# Patient Record
Sex: MALE | Race: WHITE | NOT HISPANIC OR LATINO | Employment: OTHER | URBAN - METROPOLITAN AREA
[De-identification: names, ages, dates, MRNs, and addresses within clinical notes are randomized per-mention and may not be internally consistent; named-entity substitution may affect disease eponyms.]

---

## 2017-01-23 ENCOUNTER — APPOINTMENT (EMERGENCY)
Dept: RADIOLOGY | Facility: HOSPITAL | Age: 82
DRG: 682 | End: 2017-01-23
Payer: MEDICARE

## 2017-01-23 ENCOUNTER — HOSPITAL ENCOUNTER (INPATIENT)
Facility: HOSPITAL | Age: 82
LOS: 2 days | Discharge: HOME | DRG: 682 | End: 2017-01-25
Attending: EMERGENCY MEDICINE | Admitting: INTERNAL MEDICINE
Payer: MEDICARE

## 2017-01-23 PROBLEM — R06.02 SOB (SHORTNESS OF BREATH): Status: ACTIVE | Noted: 2017-01-23

## 2017-01-23 PROBLEM — I10 UNCONTROLLED HYPERTENSION: Status: ACTIVE | Noted: 2017-01-23

## 2017-01-23 PROBLEM — R09.89 SUSPECTED CONGESTIVE HEART FAILURE: Status: ACTIVE | Noted: 2017-01-23

## 2017-01-23 LAB
ALBUMIN SERPL BCP-MCNC: 3.3 G/DL (ref 3.5–5)
ALP SERPL-CCNC: 65 U/L (ref 46–116)
ALT SERPL W P-5'-P-CCNC: 87 U/L (ref 12–78)
ANION GAP SERPL CALCULATED.3IONS-SCNC: 9 MMOL/L (ref 4–13)
AST SERPL W P-5'-P-CCNC: 36 U/L (ref 5–45)
ATRIAL RATE: 288 BPM
BASOPHILS # BLD AUTO: 0.03 THOUSANDS/ΜL (ref 0–0.1)
BASOPHILS NFR BLD AUTO: 0 % (ref 0–1)
BILIRUB SERPL-MCNC: 0.6 MG/DL (ref 0.2–1)
BILIRUB UR QL STRIP: NEGATIVE
BUN SERPL-MCNC: 27 MG/DL (ref 5–25)
CALCIUM SERPL-MCNC: 8.6 MG/DL (ref 8.3–10.1)
CHLORIDE SERPL-SCNC: 101 MMOL/L (ref 100–108)
CLARITY UR: CLEAR
CO2 SERPL-SCNC: 24 MMOL/L (ref 21–32)
COLOR UR: YELLOW
CREAT SERPL-MCNC: 1.51 MG/DL (ref 0.6–1.3)
EOSINOPHIL # BLD AUTO: 0.02 THOUSAND/ΜL (ref 0–0.61)
EOSINOPHIL NFR BLD AUTO: 0 % (ref 0–6)
ERYTHROCYTE [DISTWIDTH] IN BLOOD BY AUTOMATED COUNT: 14 % (ref 11.6–15.1)
GFR SERPL CREATININE-BSD FRML MDRD: 43.9 ML/MIN/1.73SQ M
GLUCOSE SERPL-MCNC: 138 MG/DL (ref 65–140)
GLUCOSE UR STRIP-MCNC: NEGATIVE MG/DL
HCT VFR BLD AUTO: 40.1 % (ref 36.5–49.3)
HGB BLD-MCNC: 12.9 G/DL (ref 12–17)
HGB UR QL STRIP.AUTO: NEGATIVE
KETONES UR STRIP-MCNC: NEGATIVE MG/DL
LEUKOCYTE ESTERASE UR QL STRIP: NEGATIVE
LYMPHOCYTES # BLD AUTO: 1.53 THOUSANDS/ΜL (ref 0.6–4.47)
LYMPHOCYTES NFR BLD AUTO: 14 % (ref 14–44)
MCH RBC QN AUTO: 31 PG (ref 26.8–34.3)
MCHC RBC AUTO-ENTMCNC: 32.2 G/DL (ref 31.4–37.4)
MCV RBC AUTO: 96 FL (ref 82–98)
MONOCYTES # BLD AUTO: 0.91 THOUSAND/ΜL (ref 0.17–1.22)
MONOCYTES NFR BLD AUTO: 8 % (ref 4–12)
NEUTROPHILS # BLD AUTO: 8.75 THOUSANDS/ΜL (ref 1.85–7.62)
NEUTS SEG NFR BLD AUTO: 78 % (ref 43–75)
NITRITE UR QL STRIP: NEGATIVE
NT-PROBNP SERPL-MCNC: 4603 PG/ML
PH UR STRIP.AUTO: 5 [PH] (ref 4.5–8)
PLATELET # BLD AUTO: 304 THOUSANDS/UL (ref 149–390)
PMV BLD AUTO: 10.9 FL (ref 8.9–12.7)
POTASSIUM SERPL-SCNC: 5.1 MMOL/L (ref 3.5–5.3)
PROT SERPL-MCNC: 7.2 G/DL (ref 6.4–8.2)
PROT UR STRIP-MCNC: NEGATIVE MG/DL
QRS AXIS: -41 DEGREES
QRSD INTERVAL: 92 MS
QT INTERVAL: 512 MS
QTC INTERVAL: 437 MS
RBC # BLD AUTO: 4.16 MILLION/UL (ref 3.88–5.62)
RSV AG SPEC QL: NEGATIVE
SODIUM SERPL-SCNC: 134 MMOL/L (ref 136–145)
SP GR UR STRIP.AUTO: 1.01 (ref 1–1.03)
T WAVE AXIS: 79 DEGREES
TROPONIN I SERPL-MCNC: 0.03 NG/ML
UROBILINOGEN UR QL STRIP.AUTO: 0.2 E.U./DL
VENTRICULAR RATE: 44 BPM
WBC # BLD AUTO: 11.24 THOUSAND/UL (ref 4.31–10.16)

## 2017-01-23 PROCEDURE — 81003 URINALYSIS AUTO W/O SCOPE: CPT | Performed by: INTERNAL MEDICINE

## 2017-01-23 PROCEDURE — 71020 HB CHEST X-RAY 2VW FRONTAL&LATL: CPT

## 2017-01-23 PROCEDURE — 94640 AIRWAY INHALATION TREATMENT: CPT

## 2017-01-23 PROCEDURE — 87798 DETECT AGENT NOS DNA AMP: CPT | Performed by: INTERNAL MEDICINE

## 2017-01-23 PROCEDURE — 80053 COMPREHEN METABOLIC PANEL: CPT | Performed by: EMERGENCY MEDICINE

## 2017-01-23 PROCEDURE — 93005 ELECTROCARDIOGRAM TRACING: CPT

## 2017-01-23 PROCEDURE — 85025 COMPLETE CBC W/AUTO DIFF WBC: CPT | Performed by: EMERGENCY MEDICINE

## 2017-01-23 PROCEDURE — 84484 ASSAY OF TROPONIN QUANT: CPT | Performed by: EMERGENCY MEDICINE

## 2017-01-23 PROCEDURE — 93005 ELECTROCARDIOGRAM TRACING: CPT | Performed by: EMERGENCY MEDICINE

## 2017-01-23 PROCEDURE — 87807 RSV ASSAY W/OPTIC: CPT | Performed by: EMERGENCY MEDICINE

## 2017-01-23 PROCEDURE — 83880 ASSAY OF NATRIURETIC PEPTIDE: CPT | Performed by: EMERGENCY MEDICINE

## 2017-01-23 PROCEDURE — 36415 COLL VENOUS BLD VENIPUNCTURE: CPT | Performed by: EMERGENCY MEDICINE

## 2017-01-23 PROCEDURE — 99285 EMERGENCY DEPT VISIT HI MDM: CPT

## 2017-01-23 RX ORDER — ALBUTEROL SULFATE 2.5 MG/3ML
2.5 SOLUTION RESPIRATORY (INHALATION) ONCE
Status: COMPLETED | OUTPATIENT
Start: 2017-01-23 | End: 2017-01-23

## 2017-01-23 RX ORDER — FUROSEMIDE 10 MG/ML
20 INJECTION INTRAMUSCULAR; INTRAVENOUS ONCE
Status: COMPLETED | OUTPATIENT
Start: 2017-01-23 | End: 2017-01-23

## 2017-01-23 RX ORDER — HYDRALAZINE HYDROCHLORIDE 20 MG/ML
5 INJECTION INTRAMUSCULAR; INTRAVENOUS EVERY 4 HOURS PRN
Status: DISCONTINUED | OUTPATIENT
Start: 2017-01-23 | End: 2017-01-25 | Stop reason: HOSPADM

## 2017-01-23 RX ORDER — CHLORAL HYDRATE 500 MG
1000 CAPSULE ORAL DAILY
COMMUNITY
End: 2021-01-13

## 2017-01-23 RX ORDER — FINASTERIDE 5 MG/1
5 TABLET, FILM COATED ORAL DAILY
Status: DISCONTINUED | OUTPATIENT
Start: 2017-01-24 | End: 2017-01-25 | Stop reason: HOSPADM

## 2017-01-23 RX ORDER — LEVOTHYROXINE SODIUM 0.05 MG/1
50 TABLET ORAL DAILY
Status: DISCONTINUED | OUTPATIENT
Start: 2017-01-24 | End: 2017-01-25 | Stop reason: HOSPADM

## 2017-01-23 RX ORDER — SODIUM CHLORIDE 9 MG/ML
75 INJECTION, SOLUTION INTRAVENOUS CONTINUOUS
Status: DISCONTINUED | OUTPATIENT
Start: 2017-01-23 | End: 2017-01-23

## 2017-01-23 RX ORDER — LEVOTHYROXINE SODIUM 0.05 MG/1
50 TABLET ORAL DAILY
COMMUNITY

## 2017-01-23 RX ORDER — ONDANSETRON 2 MG/ML
4 INJECTION INTRAMUSCULAR; INTRAVENOUS EVERY 6 HOURS PRN
Status: DISCONTINUED | OUTPATIENT
Start: 2017-01-23 | End: 2017-01-25 | Stop reason: HOSPADM

## 2017-01-23 RX ORDER — CHLORAL HYDRATE 500 MG
1000 CAPSULE ORAL DAILY
Status: DISCONTINUED | OUTPATIENT
Start: 2017-01-24 | End: 2017-01-25 | Stop reason: HOSPADM

## 2017-01-23 RX ORDER — HEPARIN SODIUM 5000 [USP'U]/ML
5000 INJECTION, SOLUTION INTRAVENOUS; SUBCUTANEOUS EVERY 8 HOURS SCHEDULED
Status: DISCONTINUED | OUTPATIENT
Start: 2017-01-23 | End: 2017-01-25 | Stop reason: HOSPADM

## 2017-01-23 RX ORDER — LISINOPRIL 20 MG/1
20 TABLET ORAL 2 TIMES DAILY
Status: DISCONTINUED | OUTPATIENT
Start: 2017-01-23 | End: 2017-01-23

## 2017-01-23 RX ORDER — LISINOPRIL 20 MG/1
20 TABLET ORAL 2 TIMES DAILY
COMMUNITY
End: 2017-01-25 | Stop reason: HOSPADM

## 2017-01-23 RX ORDER — ATENOLOL 50 MG/1
50 TABLET ORAL DAILY
COMMUNITY
End: 2021-01-13

## 2017-01-23 RX ORDER — FUROSEMIDE 10 MG/ML
20 INJECTION INTRAMUSCULAR; INTRAVENOUS
Status: DISCONTINUED | OUTPATIENT
Start: 2017-01-24 | End: 2017-01-24

## 2017-01-23 RX ADMIN — FUROSEMIDE 20 MG: 10 INJECTION, SOLUTION INTRAMUSCULAR; INTRAVENOUS at 20:05

## 2017-01-23 RX ADMIN — IPRATROPIUM BROMIDE 0.5 MG: 0.5 SOLUTION RESPIRATORY (INHALATION) at 14:35

## 2017-01-23 RX ADMIN — SODIUM CHLORIDE 75 ML/HR: 0.9 INJECTION, SOLUTION INTRAVENOUS at 16:29

## 2017-01-23 RX ADMIN — ALBUTEROL SULFATE 2.5 MG: 2.5 SOLUTION RESPIRATORY (INHALATION) at 14:35

## 2017-01-23 RX ADMIN — HEPARIN SODIUM 5000 UNITS: 5000 INJECTION, SOLUTION INTRAVENOUS; SUBCUTANEOUS at 21:12

## 2017-01-24 ENCOUNTER — APPOINTMENT (INPATIENT)
Dept: RADIOLOGY | Facility: HOSPITAL | Age: 82
DRG: 682 | End: 2017-01-24
Payer: MEDICARE

## 2017-01-24 LAB
ALBUMIN SERPL BCP-MCNC: 3 G/DL (ref 3.5–5)
ALP SERPL-CCNC: 54 U/L (ref 46–116)
ALT SERPL W P-5'-P-CCNC: 66 U/L (ref 12–78)
ANION GAP SERPL CALCULATED.3IONS-SCNC: 10 MMOL/L (ref 4–13)
AST SERPL W P-5'-P-CCNC: 28 U/L (ref 5–45)
BILIRUB SERPL-MCNC: 0.6 MG/DL (ref 0.2–1)
BUN SERPL-MCNC: 28 MG/DL (ref 5–25)
CALCIUM SERPL-MCNC: 8.5 MG/DL (ref 8.3–10.1)
CHLORIDE SERPL-SCNC: 105 MMOL/L (ref 100–108)
CO2 SERPL-SCNC: 23 MMOL/L (ref 21–32)
CREAT SERPL-MCNC: 1.18 MG/DL (ref 0.6–1.3)
ERYTHROCYTE [DISTWIDTH] IN BLOOD BY AUTOMATED COUNT: 14 % (ref 11.6–15.1)
FLUAV AG SPEC QL: NORMAL
FLUBV AG SPEC QL: NORMAL
GFR SERPL CREATININE-BSD FRML MDRD: 58.4 ML/MIN/1.73SQ M
GLUCOSE SERPL-MCNC: 105 MG/DL (ref 65–140)
HCT VFR BLD AUTO: 38.2 % (ref 36.5–49.3)
HGB BLD-MCNC: 12.1 G/DL (ref 12–17)
MCH RBC QN AUTO: 30.5 PG (ref 26.8–34.3)
MCHC RBC AUTO-ENTMCNC: 31.7 G/DL (ref 31.4–37.4)
MCV RBC AUTO: 96 FL (ref 82–98)
NT-PROBNP SERPL-MCNC: 5222 PG/ML
PLATELET # BLD AUTO: 283 THOUSANDS/UL (ref 149–390)
PMV BLD AUTO: 10.9 FL (ref 8.9–12.7)
POTASSIUM SERPL-SCNC: 4.5 MMOL/L (ref 3.5–5.3)
PROT SERPL-MCNC: 6.5 G/DL (ref 6.4–8.2)
RBC # BLD AUTO: 3.97 MILLION/UL (ref 3.88–5.62)
RSV B RNA SPEC QL NAA+PROBE: NORMAL
SODIUM SERPL-SCNC: 138 MMOL/L (ref 136–145)
TSH SERPL DL<=0.05 MIU/L-ACNC: 1.95 UIU/ML (ref 0.36–3.74)
WBC # BLD AUTO: 9.14 THOUSAND/UL (ref 4.31–10.16)

## 2017-01-24 PROCEDURE — 83880 ASSAY OF NATRIURETIC PEPTIDE: CPT | Performed by: INTERNAL MEDICINE

## 2017-01-24 PROCEDURE — 85027 COMPLETE CBC AUTOMATED: CPT | Performed by: INTERNAL MEDICINE

## 2017-01-24 PROCEDURE — 80053 COMPREHEN METABOLIC PANEL: CPT | Performed by: INTERNAL MEDICINE

## 2017-01-24 PROCEDURE — 84443 ASSAY THYROID STIM HORMONE: CPT | Performed by: INTERNAL MEDICINE

## 2017-01-24 PROCEDURE — 72100 X-RAY EXAM L-S SPINE 2/3 VWS: CPT

## 2017-01-24 PROCEDURE — 73521 X-RAY EXAM HIPS BI 2 VIEWS: CPT

## 2017-01-24 RX ADMIN — FINASTERIDE 5 MG: 5 TABLET, FILM COATED ORAL at 08:39

## 2017-01-24 RX ADMIN — FUROSEMIDE 20 MG: 10 INJECTION, SOLUTION INTRAMUSCULAR; INTRAVENOUS at 08:39

## 2017-01-24 RX ADMIN — LEVOTHYROXINE SODIUM 50 MCG: 50 TABLET ORAL at 08:39

## 2017-01-24 RX ADMIN — HEPARIN SODIUM 5000 UNITS: 5000 INJECTION, SOLUTION INTRAVENOUS; SUBCUTANEOUS at 14:39

## 2017-01-24 RX ADMIN — Medication 1000 MG: at 08:39

## 2017-01-24 RX ADMIN — HEPARIN SODIUM 5000 UNITS: 5000 INJECTION, SOLUTION INTRAVENOUS; SUBCUTANEOUS at 21:08

## 2017-01-24 RX ADMIN — HEPARIN SODIUM 5000 UNITS: 5000 INJECTION, SOLUTION INTRAVENOUS; SUBCUTANEOUS at 05:01

## 2017-01-25 VITALS
RESPIRATION RATE: 18 BRPM | DIASTOLIC BLOOD PRESSURE: 83 MMHG | BODY MASS INDEX: 25.43 KG/M2 | TEMPERATURE: 98.1 F | HEIGHT: 77 IN | OXYGEN SATURATION: 90 % | HEART RATE: 66 BPM | SYSTOLIC BLOOD PRESSURE: 177 MMHG | WEIGHT: 215.39 LBS

## 2017-01-25 PROBLEM — R06.02 SOB (SHORTNESS OF BREATH): Status: RESOLVED | Noted: 2017-01-23 | Resolved: 2017-01-25

## 2017-01-25 LAB
ANION GAP SERPL CALCULATED.3IONS-SCNC: 8 MMOL/L (ref 4–13)
BUN SERPL-MCNC: 28 MG/DL (ref 5–25)
CALCIUM SERPL-MCNC: 8.6 MG/DL (ref 8.3–10.1)
CHLORIDE SERPL-SCNC: 107 MMOL/L (ref 100–108)
CO2 SERPL-SCNC: 24 MMOL/L (ref 21–32)
CREAT SERPL-MCNC: 1.24 MG/DL (ref 0.6–1.3)
GFR SERPL CREATININE-BSD FRML MDRD: 55.1 ML/MIN/1.73SQ M
GLUCOSE SERPL-MCNC: 106 MG/DL (ref 65–140)
POTASSIUM SERPL-SCNC: 4.7 MMOL/L (ref 3.5–5.3)
SODIUM SERPL-SCNC: 139 MMOL/L (ref 136–145)

## 2017-01-25 PROCEDURE — 80048 BASIC METABOLIC PNL TOTAL CA: CPT | Performed by: INTERNAL MEDICINE

## 2017-01-25 RX ORDER — FUROSEMIDE 20 MG/1
20 TABLET ORAL DAILY
Qty: 30 TABLET | Refills: 0 | Status: SHIPPED | OUTPATIENT
Start: 2017-01-25 | End: 2021-01-16 | Stop reason: HOSPADM

## 2017-01-25 RX ORDER — FUROSEMIDE 20 MG/1
20 TABLET ORAL DAILY
Status: DISCONTINUED | OUTPATIENT
Start: 2017-01-25 | End: 2017-01-25 | Stop reason: HOSPADM

## 2017-01-25 RX ADMIN — HEPARIN SODIUM 5000 UNITS: 5000 INJECTION, SOLUTION INTRAVENOUS; SUBCUTANEOUS at 05:21

## 2017-01-25 RX ADMIN — FUROSEMIDE 20 MG: 20 TABLET ORAL at 09:08

## 2017-01-25 RX ADMIN — FINASTERIDE 5 MG: 5 TABLET, FILM COATED ORAL at 08:12

## 2017-01-25 RX ADMIN — LEVOTHYROXINE SODIUM 50 MCG: 50 TABLET ORAL at 08:12

## 2017-01-25 RX ADMIN — Medication 1000 MG: at 08:12

## 2017-01-26 ENCOUNTER — GENERIC CONVERSION - ENCOUNTER (OUTPATIENT)
Dept: OTHER | Facility: OTHER | Age: 82
End: 2017-01-26

## 2018-03-07 NOTE — PROGRESS NOTES
Dear Carie Cornejo,  My name is Dutch Davis and I am a Registered Nurse and Care Coordinator for [facility]  We recently  spoke on the phone regarding your hospital stay and you opted to receive follow-up phone calls from me  Because of the reason that you were in the hospital, Medicare has placed you in a program called [address]  One of  the benefits of the program is that you can have a nurse call regularly to answer any questions or concerns you may have  My phone number is listed below in case you would need to contact me      Sincerely,   Dutch Davis RN  [phone]            Electronically signed Vashti Luna RN  Jan 26 2017  1:57PM EST Author

## 2019-04-25 ENCOUNTER — APPOINTMENT (OUTPATIENT)
Dept: LAB | Facility: CLINIC | Age: 84
End: 2019-04-25
Payer: MEDICARE

## 2019-04-25 ENCOUNTER — TELEPHONE (OUTPATIENT)
Dept: UROLOGY | Facility: MEDICAL CENTER | Age: 84
End: 2019-04-25

## 2019-04-25 DIAGNOSIS — R30.0 DYSURIA: ICD-10-CM

## 2019-04-25 DIAGNOSIS — R31.0 GROSS HEMATURIA: ICD-10-CM

## 2019-04-25 DIAGNOSIS — R31.0 GROSS HEMATURIA: Primary | ICD-10-CM

## 2019-04-25 LAB
BACTERIA UR QL AUTO: ABNORMAL /HPF
BILIRUB UR QL STRIP: ABNORMAL
CLARITY UR: ABNORMAL
COLOR UR: ABNORMAL
GLUCOSE UR STRIP-MCNC: NEGATIVE MG/DL
HGB UR QL STRIP.AUTO: ABNORMAL
HYALINE CASTS #/AREA URNS LPF: ABNORMAL /LPF
KETONES UR STRIP-MCNC: ABNORMAL MG/DL
LEUKOCYTE ESTERASE UR QL STRIP: ABNORMAL
NITRITE UR QL STRIP: NEGATIVE
NON-SQ EPI CELLS URNS QL MICRO: ABNORMAL /HPF
PH UR STRIP.AUTO: 6 [PH]
PROT UR STRIP-MCNC: ABNORMAL MG/DL
RBC #/AREA URNS AUTO: ABNORMAL /HPF
SP GR UR STRIP.AUTO: 1.02 (ref 1–1.03)
UROBILINOGEN UR QL STRIP.AUTO: 0.2 E.U./DL
WBC #/AREA URNS AUTO: ABNORMAL /HPF

## 2019-04-25 PROCEDURE — 87147 CULTURE TYPE IMMUNOLOGIC: CPT

## 2019-04-25 PROCEDURE — 81001 URINALYSIS AUTO W/SCOPE: CPT

## 2019-04-25 PROCEDURE — 87086 URINE CULTURE/COLONY COUNT: CPT

## 2019-04-26 DIAGNOSIS — R30.0 DYSURIA: Primary | ICD-10-CM

## 2019-04-26 LAB — BACTERIA UR CULT: ABNORMAL

## 2019-04-26 RX ORDER — NITROFURANTOIN 25; 75 MG/1; MG/1
100 CAPSULE ORAL 2 TIMES DAILY
Qty: 10 CAPSULE | Refills: 0 | Status: SHIPPED | OUTPATIENT
Start: 2019-04-26 | End: 2019-05-01

## 2020-07-30 PROCEDURE — 88305 TISSUE EXAM BY PATHOLOGIST: CPT | Performed by: PATHOLOGY

## 2020-07-31 ENCOUNTER — LAB REQUISITION (OUTPATIENT)
Dept: LAB | Facility: HOSPITAL | Age: 85
End: 2020-07-31
Payer: MEDICARE

## 2020-07-31 DIAGNOSIS — D48.5 NEOPLASM OF UNCERTAIN BEHAVIOR OF SKIN: ICD-10-CM

## 2021-01-08 ENCOUNTER — AMB VIDEO VISIT (OUTPATIENT)
Dept: URGENT CARE | Age: 86
End: 2021-01-08

## 2021-01-08 DIAGNOSIS — R30.0 DYSURIA: Primary | ICD-10-CM

## 2021-01-08 RX ORDER — SULFAMETHOXAZOLE AND TRIMETHOPRIM 800; 160 MG/1; MG/1
1 TABLET ORAL EVERY 12 HOURS SCHEDULED
Qty: 14 TABLET | Refills: 0 | Status: SHIPPED | OUTPATIENT
Start: 2021-01-08 | End: 2021-01-15

## 2021-01-08 NOTE — PATIENT INSTRUCTIONS
Take medications as directed  Drink plenty of fluids  Follow up with family doctor this week  Go to ER immediately if new or worsening symptoms occur  Dysuria   WHAT YOU NEED TO KNOW:   Dysuria is difficulty urinating, or pain, burning, or discomfort with urination  Dysuria is usually a symptom of another problem  DISCHARGE INSTRUCTIONS:   Return to the emergency department if:   · You have severe back, side, or abdominal pain  · You have fever and shaking chills  · You vomit several times in a row  Contact your healthcare provider if:   · Your symptoms do not go away, even after treatment  · You have questions or concerns about your condition or care  Medicines:   · Medicines  may be given to help treat a bacterial infection or help decrease bladder spasms  · Take your medicine as directed  Contact your healthcare provider if you think your medicine is not helping or if you have side effects  Tell him of her if you are allergic to any medicine  Keep a list of the medicines, vitamins, and herbs you take  Include the amounts, and when and why you take them  Bring the list or the pill bottles to follow-up visits  Carry your medicine list with you in case of an emergency  Follow up with your healthcare provider as directed: Your healthcare provider may also refer you to a urologist or nephrologist to have additional testing  Write down your questions so you remember to ask them during your visits  Manage your dysuria:   · Drink more liquids  Liquids help flush out bacteria that may be causing an infection  Ask your healthcare provider how much liquid to drink each day and which liquids are best for you  · Take sitz baths as directed  Fill a bathtub with 4 to 6 inches of warm water  You may also use a sitz bath pan that fits over a toilet  Sit in the sitz bath for 20 minutes  Do this 2 to 3 times a day, or as directed  The warm water can help decrease pain and swelling       © Copyright [address] Information is for [employer] use only and may not be sold, redistributed or otherwise used for commercial purposes  All illustrations and images included in CareNotes® are the copyrighted property of A D A M , Inc  or Kathrine Bone  The above information is an  only  It is not intended as medical advice for individual conditions or treatments  Talk to your doctor, nurse or pharmacist before following any medical regimen to see if it is safe and effective for you

## 2021-01-08 NOTE — CARE ANYWHERE EVISITS
[facility] - Gender: Male - Date of Birth: [id number]  Date: [id number] - Duration: 6 minutes  [facility]  Provider: Ameya Garrett PA-C    Patient Contact Information  Address  [address] [place] [address] Venessa Velasquez [address]  [phone]    Visit Topics  burning during urination, tinge of blood in urine [Added By: Self - 2021-01-08]    Triage Questions   What is your current physical address in the event of a medical emergency? Answer []  Are you allergic to any medications? Answer []  Are you now or could you be pregnant? Answer []  Do you have any immune system compromise or chronic lung   disease? Answer []  Do you have any vulnerable family members in the home (infant, pregnant, cancer, elderly)? Answer []     Conversation Transcripts  [0A][0A] [Notification] You are connected with Ameya Garrett PA-C, Urgent Care Specialist [0A][Notification] Dada Jensen is located in [place]  [0A][Notification] Dada Jensen has shared health history  [place]  [0A]    Diagnosis  Acute cystitis with hematuria    Procedures  Value: [id number] Code: CPT-4 UNLISTED E&M SERVICE    Medications Prescribed    No prescriptions ordered    Electronically signed by: Farzad Schulzt(NPI [id number])

## 2021-01-08 NOTE — PROGRESS NOTES
Video Visit - Andreas Tellezstefani 80 y o  male MRN: [id number]    I educated patient and family that in this setting I cannot send urine for culture  If for whatever reason urine is resistant to the antibiotic I will prescribe, we will have no way of knowing  I educated them that they should probably go to the urgent care to get the urine cultured  They do not want to do this and would like to be treated empirically  I educated them on signs and symptoms of worsening condition in indications to go to the ER  I educated them on proper hydration  Patient and family state they understand and agree    Not on blood thinners  NKDA    REQUIRED DOCUMENTATION:         1  This service was provided via [facility]  2  Provider located at [place] [address] [address] [place] [address]  [phone]   3  [facility] provider: Rafat House PA-C   4  Identify all parties in room with patient during [facility] visit:  Self, daughter, son-in-law  11  After connecting through [employer], patient was identified by name and date of birth  Patient was then informed that this was a Telemedicine visit and that the exam was being conducted confidentially over secure lines  My office door was closed  No one else was in the room  Patient acknowledged consent and understanding of privacy and security of the Telemedicine visit  I informed the patient that I have reviewed their record in Epic and presented the opportunity for them to ask any questions regarding the visit today  The patient agreed to participate  Difficulty Urinating   This is a new problem  The current episode started today  The problem occurs every urination  The problem has been rapidly improving (Went to the bathroom to urinate while we were on the call, no burning)  The quality of the pain is described as burning  There has been no fever  He is not sexually active   Associated symptoms include frequency, hematuria (Small amount with urination last night, none at most recent urination) and urgency  Pertinent negatives include no chills, discharge, flank pain, hesitancy, nausea, sweats or vomiting  He has tried nothing for the symptoms  The treatment provided no relief  There is no history of catheterization, kidney stones, a single kidney, urinary stasis or a urological procedure  Physical Exam  Constitutional:       Appearance: Normal appearance  HENT:      Head: Normocephalic and atraumatic  Pulmonary:      Effort: Pulmonary effort is normal  No respiratory distress  Abdominal:      General: There is no distension  Tenderness: There is no abdominal tenderness  Comments: Denies any flank pain   Skin:     Capillary Refill: Capillary refill takes less than 2 seconds  Coloration: Skin is not pale  Findings: No rash  Neurological:      Mental Status: He is alert  Diagnoses and all orders for this visit:    Dysuria  -     sulfamethoxazole-trimethoprim (BACTRIM DS) 800-160 mg per tablet; Take 1 tablet by mouth every 12 (twelve) hours for 7 days      Patient Instructions   Take medications as directed  Drink plenty of fluids  Follow up with family doctor this week  Go to ER immediately if new or worsening symptoms occur  Dysuria   WHAT YOU NEED TO KNOW:   Dysuria is difficulty urinating, or pain, burning, or discomfort with urination  Dysuria is usually a symptom of another problem  DISCHARGE INSTRUCTIONS:   Return to the emergency department if:   · You have severe back, side, or abdominal pain  · You have fever and shaking chills  · You vomit several times in a row  Contact your healthcare provider if:   · Your symptoms do not go away, even after treatment  · You have questions or concerns about your condition or care  Medicines:   · Medicines  may be given to help treat a bacterial infection or help decrease bladder spasms  · Take your medicine as directed    Contact your healthcare provider if you think your medicine is not helping or if you have side effects  Tell him of her if you are allergic to any medicine  Keep a list of the medicines, vitamins, and herbs you take  Include the amounts, and when and why you take them  Bring the list or the pill bottles to follow-up visits  Carry your medicine list with you in case of an emergency  Follow up with your healthcare provider as directed: Your healthcare provider may also refer you to a urologist or nephrologist to have additional testing  Write down your questions so you remember to ask them during your visits  Manage your dysuria:   · Drink more liquids  Liquids help flush out bacteria that may be causing an infection  Ask your healthcare provider how much liquid to drink each day and which liquids are best for you  · Take sitz baths as directed  Fill a bathtub with 4 to 6 inches of warm water  You may also use a sitz bath pan that fits over a toilet  Sit in the sitz bath for 20 minutes  Do this 2 to 3 times a day, or as directed  The warm water can help decrease pain and swelling  © Copyright [facility] [address] Information is for End User's use only and may not be sold, redistributed or otherwise used for commercial purposes  All illustrations and images included in CareNotes® are the copyrighted property of A D A M , Inc  or [facility] Nikita Hendrickson   The above information is an  only  It is not intended as medical advice for individual conditions or treatments  Talk to your doctor, nurse or pharmacist before following any medical regimen to see if it is safe and effective for you  Follow up with PCP if not improved, if symptoms are worse, go to the ER

## 2021-01-13 ENCOUNTER — APPOINTMENT (EMERGENCY)
Dept: RADIOLOGY | Facility: HOSPITAL | Age: 86
DRG: 683 | End: 2021-01-13
Payer: MEDICARE

## 2021-01-13 ENCOUNTER — HOSPITAL ENCOUNTER (INPATIENT)
Facility: HOSPITAL | Age: 86
LOS: 3 days | Discharge: HOME HEALTH | DRG: 683 | End: 2021-01-16
Attending: INTERNAL MEDICINE | Admitting: INTERNAL MEDICINE
Payer: MEDICARE

## 2021-01-13 DIAGNOSIS — I48.91 NEW ONSET A-FIB (HCC): ICD-10-CM

## 2021-01-13 DIAGNOSIS — N40.0 BENIGN PROSTATIC HYPERPLASIA, UNSPECIFIED WHETHER LOWER URINARY TRACT SYMPTOMS PRESENT: ICD-10-CM

## 2021-01-13 DIAGNOSIS — R93.41 ABNORMAL ULTRASOUND OF BLADDER: ICD-10-CM

## 2021-01-13 DIAGNOSIS — R26.2 AMBULATORY DYSFUNCTION: ICD-10-CM

## 2021-01-13 DIAGNOSIS — I48.91 NEW ONSET ATRIAL FIBRILLATION (HCC): ICD-10-CM

## 2021-01-13 DIAGNOSIS — R09.89 SUSPECTED CONGESTIVE HEART FAILURE: ICD-10-CM

## 2021-01-13 DIAGNOSIS — N17.9 AKI (ACUTE KIDNEY INJURY) (HCC): Primary | ICD-10-CM

## 2021-01-13 DIAGNOSIS — E87.1 HYPONATREMIA: ICD-10-CM

## 2021-01-13 DIAGNOSIS — I50.9 ACUTE ON CHRONIC HEART FAILURE, UNSPECIFIED HEART FAILURE TYPE (HCC): ICD-10-CM

## 2021-01-13 LAB
ANION GAP SERPL CALCULATED.3IONS-SCNC: 13 MMOL/L (ref 4–13)
APTT PPP: 27 SECONDS (ref 23–31)
ATRIAL RATE: 0 BPM
ATRIAL RATE: 0 BPM
BASOPHILS # BLD MANUAL: 0 THOUSAND/UL (ref 0–0.1)
BASOPHILS NFR MAR MANUAL: 0 % (ref 0–1)
BNP SERPL-MCNC: 771.4 PG/ML (ref 1–100)
BUN SERPL-MCNC: 81 MG/DL (ref 6–20)
CALCIUM SERPL-MCNC: 8.5 MG/DL (ref 8.4–10.2)
CHLORIDE SERPL-SCNC: 97 MMOL/L (ref 96–108)
CO2 SERPL-SCNC: 18 MMOL/L (ref 22–33)
CREAT SERPL-MCNC: 2.92 MG/DL (ref 0.5–1.2)
EOSINOPHIL # BLD MANUAL: 0.2 THOUSAND/UL (ref 0–0.4)
EOSINOPHIL NFR BLD MANUAL: 2 % (ref 0–6)
ERYTHROCYTE [DISTWIDTH] IN BLOOD BY AUTOMATED COUNT: 14.8 % (ref 11.6–15.1)
GFR SERPL CREATININE-BSD FRML MDRD: 18 ML/MIN/1.73SQ M
GLUCOSE SERPL-MCNC: 158 MG/DL (ref 65–140)
HCT VFR BLD AUTO: 35.7 % (ref 36.5–49.3)
HGB BLD-MCNC: 12.1 G/DL (ref 12–17)
INR PPP: 1.01 (ref 0.9–1.1)
LYMPHOCYTES # BLD AUTO: 2.69 THOUSAND/UL (ref 0.6–4.47)
LYMPHOCYTES # BLD AUTO: 27 % (ref 14–44)
MAGNESIUM SERPL-MCNC: 2.8 MG/DL (ref 1.6–2.6)
MCH RBC QN AUTO: 32.3 PG (ref 26.8–34.3)
MCHC RBC AUTO-ENTMCNC: 33.9 G/DL (ref 31.4–37.4)
MCV RBC AUTO: 95 FL (ref 82–98)
MONOCYTES # BLD AUTO: 0.4 THOUSAND/UL (ref 0–1.22)
MONOCYTES NFR BLD: 4 % (ref 4–12)
NEUTROPHILS # BLD MANUAL: 6.67 THOUSAND/UL (ref 1.85–7.62)
NEUTS BAND NFR BLD MANUAL: 6 % (ref 0–8)
NEUTS SEG NFR BLD AUTO: 61 % (ref 43–75)
OSMOLALITY UR/SERPL-RTO: 299 MMOL/KG (ref 282–298)
OSMOLALITY UR: 271 MMOL/KG
PLATELET # BLD AUTO: 329 THOUSANDS/UL (ref 149–390)
PLATELET BLD QL SMEAR: ADEQUATE
PMV BLD AUTO: 10.9 FL (ref 8.9–12.7)
POTASSIUM SERPL-SCNC: 4.8 MMOL/L (ref 3.5–5)
PR INTERVAL: 210 MS
PR INTERVAL: 210 MS
PROTHROMBIN TIME: 11.4 SECONDS (ref 9.5–12.1)
QRS AXIS: -49 DEGREES
QRS AXIS: -49 DEGREES
QRSD INTERVAL: 111 MS
QRSD INTERVAL: 111 MS
QT INTERVAL: 450 MS
QT INTERVAL: 450 MS
QTC INTERVAL: 443 MS
QTC INTERVAL: 443 MS
RBC # BLD AUTO: 3.75 MILLION/UL (ref 3.88–5.62)
RBC MORPH BLD: NORMAL
SODIUM SERPL-SCNC: 128 MMOL/L (ref 133–145)
T WAVE AXIS: 78 DEGREES
T WAVE AXIS: 78 DEGREES
TOTAL CELLS COUNTED SPEC: 100
TROPONIN I SERPL-MCNC: 0.03 NG/ML (ref 0–0.07)
TROPONIN I SERPL-MCNC: 0.04 NG/ML (ref 0–0.07)
TROPONIN I SERPL-MCNC: 0.04 NG/ML (ref 0–0.07)
TSH SERPL DL<=0.05 MIU/L-ACNC: 3.32 UIU/ML (ref 0.34–5.6)
VENTRICULAR RATE: 58 BPM
VENTRICULAR RATE: 58 BPM
WBC # BLD AUTO: 9.96 THOUSAND/UL (ref 4.31–10.16)

## 2021-01-13 PROCEDURE — 85730 THROMBOPLASTIN TIME PARTIAL: CPT | Performed by: INTERNAL MEDICINE

## 2021-01-13 PROCEDURE — 83735 ASSAY OF MAGNESIUM: CPT | Performed by: PHYSICIAN ASSISTANT

## 2021-01-13 PROCEDURE — 93010 ELECTROCARDIOGRAM REPORT: CPT | Performed by: INTERNAL MEDICINE

## 2021-01-13 PROCEDURE — 82570 ASSAY OF URINE CREATININE: CPT | Performed by: INTERNAL MEDICINE

## 2021-01-13 PROCEDURE — 93005 ELECTROCARDIOGRAM TRACING: CPT

## 2021-01-13 PROCEDURE — 85027 COMPLETE CBC AUTOMATED: CPT | Performed by: PHYSICIAN ASSISTANT

## 2021-01-13 PROCEDURE — 99284 EMERGENCY DEPT VISIT MOD MDM: CPT

## 2021-01-13 PROCEDURE — 80048 BASIC METABOLIC PNL TOTAL CA: CPT | Performed by: PHYSICIAN ASSISTANT

## 2021-01-13 PROCEDURE — 71046 X-RAY EXAM CHEST 2 VIEWS: CPT

## 2021-01-13 PROCEDURE — 83036 HEMOGLOBIN GLYCOSYLATED A1C: CPT | Performed by: INTERNAL MEDICINE

## 2021-01-13 PROCEDURE — 85007 BL SMEAR W/DIFF WBC COUNT: CPT | Performed by: PHYSICIAN ASSISTANT

## 2021-01-13 PROCEDURE — 36415 COLL VENOUS BLD VENIPUNCTURE: CPT | Performed by: PHYSICIAN ASSISTANT

## 2021-01-13 PROCEDURE — 84484 ASSAY OF TROPONIN QUANT: CPT | Performed by: INTERNAL MEDICINE

## 2021-01-13 PROCEDURE — 84300 ASSAY OF URINE SODIUM: CPT | Performed by: INTERNAL MEDICINE

## 2021-01-13 PROCEDURE — 83930 ASSAY OF BLOOD OSMOLALITY: CPT | Performed by: PHYSICIAN ASSISTANT

## 2021-01-13 PROCEDURE — 83935 ASSAY OF URINE OSMOLALITY: CPT | Performed by: INTERNAL MEDICINE

## 2021-01-13 PROCEDURE — 99284 EMERGENCY DEPT VISIT MOD MDM: CPT | Performed by: PHYSICIAN ASSISTANT

## 2021-01-13 PROCEDURE — 84484 ASSAY OF TROPONIN QUANT: CPT | Performed by: PHYSICIAN ASSISTANT

## 2021-01-13 PROCEDURE — 1123F ACP DISCUSS/DSCN MKR DOCD: CPT | Performed by: PHYSICIAN ASSISTANT

## 2021-01-13 PROCEDURE — 83880 ASSAY OF NATRIURETIC PEPTIDE: CPT | Performed by: PHYSICIAN ASSISTANT

## 2021-01-13 PROCEDURE — 99223 1ST HOSP IP/OBS HIGH 75: CPT | Performed by: INTERNAL MEDICINE

## 2021-01-13 PROCEDURE — 84443 ASSAY THYROID STIM HORMONE: CPT | Performed by: INTERNAL MEDICINE

## 2021-01-13 PROCEDURE — 85610 PROTHROMBIN TIME: CPT | Performed by: INTERNAL MEDICINE

## 2021-01-13 RX ORDER — METOPROLOL SUCCINATE 25 MG/1
25 TABLET, EXTENDED RELEASE ORAL DAILY
COMMUNITY
End: 2021-01-16 | Stop reason: HOSPADM

## 2021-01-13 RX ORDER — HEPARIN SODIUM 1000 [USP'U]/ML
2000 INJECTION, SOLUTION INTRAVENOUS; SUBCUTANEOUS
Status: DISCONTINUED | OUTPATIENT
Start: 2021-01-13 | End: 2021-01-14

## 2021-01-13 RX ORDER — FUROSEMIDE 10 MG/ML
20 INJECTION INTRAMUSCULAR; INTRAVENOUS ONCE
Status: COMPLETED | OUTPATIENT
Start: 2021-01-13 | End: 2021-01-13

## 2021-01-13 RX ORDER — SACUBITRIL AND VALSARTAN 24; 26 MG/1; MG/1
1 TABLET, FILM COATED ORAL 2 TIMES DAILY
COMMUNITY
End: 2021-01-16 | Stop reason: HOSPADM

## 2021-01-13 RX ORDER — HEPARIN SODIUM 10000 [USP'U]/100ML
3-20 INJECTION, SOLUTION INTRAVENOUS
Status: DISCONTINUED | OUTPATIENT
Start: 2021-01-13 | End: 2021-01-14

## 2021-01-13 RX ORDER — SODIUM CHLORIDE 9 MG/ML
50 INJECTION, SOLUTION INTRAVENOUS CONTINUOUS
Status: DISCONTINUED | OUTPATIENT
Start: 2021-01-13 | End: 2021-01-13

## 2021-01-13 RX ORDER — ASPIRIN 81 MG/1
81 TABLET ORAL DAILY
COMMUNITY
End: 2021-01-16 | Stop reason: HOSPADM

## 2021-01-13 RX ORDER — LEVOTHYROXINE SODIUM 0.05 MG/1
50 TABLET ORAL DAILY
Status: DISCONTINUED | OUTPATIENT
Start: 2021-01-14 | End: 2021-01-16 | Stop reason: HOSPADM

## 2021-01-13 RX ORDER — FINASTERIDE 5 MG/1
5 TABLET, FILM COATED ORAL DAILY
Status: DISCONTINUED | OUTPATIENT
Start: 2021-01-14 | End: 2021-01-16 | Stop reason: HOSPADM

## 2021-01-13 RX ORDER — HEPARIN SODIUM 1000 [USP'U]/ML
4000 INJECTION, SOLUTION INTRAVENOUS; SUBCUTANEOUS
Status: DISCONTINUED | OUTPATIENT
Start: 2021-01-13 | End: 2021-01-14

## 2021-01-13 RX ORDER — METOPROLOL SUCCINATE 25 MG/1
25 TABLET, EXTENDED RELEASE ORAL DAILY
Status: DISCONTINUED | OUTPATIENT
Start: 2021-01-14 | End: 2021-01-14

## 2021-01-13 RX ORDER — ONDANSETRON 2 MG/ML
4 INJECTION INTRAMUSCULAR; INTRAVENOUS EVERY 6 HOURS PRN
Status: DISCONTINUED | OUTPATIENT
Start: 2021-01-13 | End: 2021-01-16 | Stop reason: HOSPADM

## 2021-01-13 RX ORDER — ASPIRIN 81 MG/1
81 TABLET ORAL DAILY
Status: DISCONTINUED | OUTPATIENT
Start: 2021-01-14 | End: 2021-01-15

## 2021-01-13 RX ORDER — ACETAMINOPHEN 325 MG/1
650 TABLET ORAL EVERY 4 HOURS PRN
Status: DISCONTINUED | OUTPATIENT
Start: 2021-01-13 | End: 2021-01-16 | Stop reason: HOSPADM

## 2021-01-13 RX ADMIN — FUROSEMIDE 20 MG: 10 INJECTION, SOLUTION INTRAMUSCULAR; INTRAVENOUS at 17:00

## 2021-01-13 RX ADMIN — HEPARIN SODIUM 12 UNITS/KG/HR: 10000 INJECTION, SOLUTION INTRAVENOUS at 19:03

## 2021-01-13 RX ADMIN — SODIUM CHLORIDE 125 ML/HR: 0.9 INJECTION, SOLUTION INTRAVENOUS at 14:27

## 2021-01-13 NOTE — H&P
H&P- Rickeyfaraz No Difilippantonio 7/23/1929, 80 y o  male MRN: [id number]    Unit/Bed#: -01 Encounter: [id number]    Primary Care Provider: Rubina Caraballo MD   Date and time admitted to hospital: 1/13/2021  1:49 PM        LUIS (acute kidney injury) [facility])  Assessment & Plan  Pt here with creatinine of 2 9 probably due to prerenal in origin  Baseline 1 1-1 2  Elevated BUN of 90 on admission  Pt was taking Bactrim since last 1 week which may have except septated LUIS  Plan  -nephrology consulted  Follow-up with your electrolyte  -follow-up with bladder scan or  -hold Lasix and Entresto  Avoid nephrotoxic drugs  CMP in a m  * Acute on chronic heart failure [facility])  Assessment & Plan  Wt Readings from Last 3 Encounters:   01/14/21 75 8 kg (167 lb)   01/23/17 97 7 kg (215 lb 6 2 oz)   05/03/16 95 3 kg (210 lb)   Following up with cardiologist Dr Shwetha Aguilar  Pt not sure about last echocardiogram states around 3 years ago  Previous admission for acute onset diastolic heart failure  B/L rays pitting edema found on examination  Admitted for LUIS  No chest pain, palpitations, lack tenderness or pain, fever, N/V   EKG showing new onset AFib  CXR showing cardiomegaly but no vascular congestion present  Lipid panel on 01/12/2021 was WNL  Troponin times on WNL  BNP on admission 700  Continue taking metoprolol  Continue to hold Lasix and Entresto  Daily weight  I's and O's  Salt restricted diet  Will start on very gentle hydration 50 cc/hour normal saline due to suspicion of hypovolemia  Follow-up with HbA1c  Follow-up TSH  Continue trending troponin x2  Will call patient's cardiologist tomorrow morning  BPH (benign prostatic hyperplasia)  Assessment & Plan  Continue finasteride    Ambulatory dysfunction  Assessment & Plan  Uses walker at home    Follow-up with PT OT  Case management following  Supportive measures  Fall precautions  Activity with assistance      New onset a-fib [facility])  Assessment & Plan  EKG showing new onset AFib  Rate controlled  On heparin drip  Telemetry monitoring  Cardiology consult  Continue metoprolol    Hyponatremia  Assessment & Plan  Sodium of 128 on admission secondary to volume overload  Continue diuretics  Follow-up with urine electrolytes    Hypothyroidism  Assessment & Plan  Continue levothyroxine  Will follow-up with TSH in a niya Segura Hypertension  Assessment & Plan  Continue metoprolol  Hold home Lasix and Entresto due to LUIS  Emergency Contacts: Extended Emergency Contact Information  Primary Emergency Contact: [address] Phone: [phone]  Relation: Spouse  Secondary Emergency Contact: Shira Hough   [address] Phone: [phone]  Mobile Phone: [phone]  Relation: Daughter      VTE Prophylaxis: Heparin  / sequential compression device and foot pump applied   Code Status:  Level 3 DNR DNI  POLST: There is no POLST form on file for this patient (pre-hospital)    Discussion with family:  Discussed with daughter in length was present while encounter  Talked to her about patient's condition and probable cause and treatment plan  Daughter agrees with the plan  All questions/concerns were answered  Anticipated Length of Stay:  Patient will be admitted on an Inpatient basis with an anticipated length of stay of  > 2 midnights  Justification for Hospital Stay:  Acute on chronic heart failure , LUIS, hyponatremia    Total Time for Visit, including Counseling / Coordination of Care: 45 minutes  Greater than 50% of this total time spent on direct patient counseling and coordination of care  Chief Complaint:   Referral by PCP due to elevated creatinine, hyponatremia    History of Present Illness:    Dina Mai is a 80 y o  male with PMH of HTN, hypothyroidism, heart failure who presents after being referred here from PCP's office for abnormal labs  Creatinine was elevated to 2 9 from baseline 1 1-1 2  Sodium of 128  Referred here from PCP's office for further evaluation  As per patient's daughter Pt was taking Bactrim since last 1 week for UTI  Pt denies any SOB, chest pain, palpitations, visual disturbance, headache, dizziness, abdominal pain, nausea vomiting, fever, diarrhea,    Vital stable  Creatinine of 2 on admission  Sodium 128  BNP of 800  Troponin x1 WNL  EKG showing new onset AFib  CXR showing no vascular congestion and cardiomegaly  Pt is is 1 dose of 20 IV Lasix in ED  Review of Systems:    Review of Systems   Constitutional: Negative for activity change, appetite change, chills, diaphoresis, fatigue, fever and unexpected weight change  HENT: Negative for rhinorrhea and sore throat  Eyes: Negative for visual disturbance  Respiratory: Negative for cough, chest tightness and shortness of breath  Cardiovascular: Positive for leg swelling  Negative for chest pain and palpitations  Gastrointestinal: Negative for abdominal distention, abdominal pain, blood in stool, constipation, diarrhea, nausea and vomiting  Genitourinary: Negative for difficulty urinating  Musculoskeletal: Negative for arthralgias  Neurological: Negative for headaches  Psychiatric/Behavioral: Negative for behavioral problems and sleep disturbance  Past Medical and Surgical History:     Past Medical History:   Diagnosis Date    Disease of thyroid gland     Hypertension        No past surgical history on file  Meds/Allergies:    Prior to Admission medications    Medication Sig Start Date End Date Taking?  Authorizing Provider   aspirin (ECOTRIN LOW STRENGTH) 81 mg EC tablet Take 81 mg by mouth daily   Yes Historical Provider, MD   FINASTERIDE PO Take 5 mg by mouth daily   Yes Historical Provider, MD   furosemide (LASIX) 20 mg tablet Take 1 tablet by mouth daily for 30 days  Patient taking differently: Take 20 mg by mouth 2 (two) times a day  1/25/17 1/13/21 Yes Frank Shankar MD   levothyroxine 50 mcg tablet Take 50 mcg by mouth daily   Yes Historical Provider, MD   metoprolol succinate (TOPROL-XL) 25 mg 24 hr tablet Take 25 mg by mouth daily   Yes Historical Provider, MD   sacubitril-valsartan (Entresto) 24-26 MG TABS Take 1 tablet by mouth 2 (two) times a day   Yes Historical Provider, MD   atenolol (TENORMIN) 50 mg tablet Take 50 mg by mouth daily    Historical Provider, MD   Omega-3 Fatty Acids (FISH OIL) 1,000 mg Take 1,000 mg by mouth daily    Historical Provider, MD     I have reviewed home medications with patient personally  Allergies: No Known Allergies    Social History:     Marital Status: /Civil Union   Occupation:  Retired   Patient Pre-hospital Living Situation:  Home  Patient Pre-hospital Level of Mobility:  Wife   Patient Pre-hospital Diet Restrictions:  Cardiac  Substance Use History:   Social History     Substance and Sexual Activity   Alcohol Use No     Social History     Tobacco Use   Smoking Status Former Smoker     Social History     Substance and Sexual Activity   Drug Use No       Family History:    No family history on file  Physical Exam:     Vitals:   Blood Pressure: 102/55 (01/14/21 0341)  Pulse: (!) 51 (01/14/21 0341)  Temperature: 98 °F (36 7 °C) (01/14/21 0341)  Temp Source: Tympanic (01/14/21 0341)  Respirations: 16 (01/14/21 0341)  Height: 5' 5" (165 1 cm) (01/13/21 1806)  Weight - Scale: 75 8 kg (167 lb) (01/14/21 0600)  SpO2: 96 % (01/14/21 0341)    Physical Exam  Vitals signs reviewed  Constitutional:       General: He is not in acute distress  Appearance: He is well-developed  Cardiovascular:      Rate and Rhythm: Normal rate and regular rhythm  Pulses: Normal pulses  Heart sounds: Normal heart sounds  Pulmonary:      Effort: Pulmonary effort is normal       Breath sounds: Rales present  Chest:      Chest wall: No tenderness  Abdominal:      General: Bowel sounds are normal  There is no distension  Palpations: Abdomen is soft  Tenderness: There is no abdominal tenderness  Musculoskeletal:      Right lower leg: Edema present  Left lower leg: Edema present  Skin:     General: Skin is warm  Coloration: Skin is not pale  Neurological:      General: No focal deficit present  Mental Status: He is alert and oriented to person, place, and time  Mental status is at baseline  Psychiatric:         Mood and Affect: Mood normal          Behavior: Behavior normal            Additional Data:     Lab Results: I have personally reviewed pertinent reports  Results from last 7 days   Lab Units 01/13/21  1424   WBC Thousand/uL 9 96   HEMOGLOBIN g/dL 12 1   HEMATOCRIT % 35 7*   PLATELETS Thousands/uL 329   BANDS PCT % 6   LYMPHO PCT % 27   MONO PCT % 4   EOS PCT % 2     Results from last 7 days   Lab Units 01/14/21  0554   SODIUM mmol/L 134   POTASSIUM mmol/L 4 8   CHLORIDE mmol/L 103   CO2 mmol/L 21*   BUN mg/dL 74*   CREATININE mg/dL 2 71*   ANION GAP mmol/L 10   CALCIUM mg/dL 8 5   GLUCOSE RANDOM mg/dL 91     Results from last 7 days   Lab Units 01/13/21  1846   INR  1 01         Results from last 7 days   Lab Units 01/13/21  1840   HEMOGLOBIN A1C % 5 6           Imaging: I have personally reviewed pertinent reports  XR chest 2 views   Final Result by Tim Barkley MD (01/13 3029)   No acute cardiopulmonary disease  Stable appearance            Workstation performed: [id number]             EKG, Pathology, and Other Studies Reviewed on Admission:   · EKG:  New onset AFib with no identifiable P waves and old infarct    Allscripts / Epic Records Reviewed: Yes     ** Please Note: This note has been constructed using a voice recognition system   **

## 2021-01-13 NOTE — ASSESSMENT & PLAN NOTE
Uses walker at home    Follow-up with PT OT  Case management following  Supportive measures  Fall precautions  Activity with assistance

## 2021-01-13 NOTE — ASSESSMENT & PLAN NOTE
Pt here with creatinine of 2 9 probably due to prerenal in origin  Baseline 1 1-1 2  Elevated BUN of 90 on admission  Pt was taking Bactrim since last 1 week which may have except septated LUIS  Plan  -nephrology consulted  Follow-up with your electrolyte  -follow-up with bladder scan or  -hold Lasix and Entresto  Avoid nephrotoxic drugs  CMP in a m

## 2021-01-13 NOTE — ASSESSMENT & PLAN NOTE
Wt Readings from Last 3 Encounters:   01/13/21 79 8 kg (176 lb)   01/23/17 97 7 kg (215 lb 6 2 oz)   05/03/16 95 3 kg (210 lb)   Following up with cardiologist Dr Berna Herad  Pt not sure about last echocardiogram states around 3 years ago  Previous admission for acute onset diastolic heart failure  B/L rays pitting edema found on examination  Admitted for LUIS  No chest pain, palpitations, lack tenderness or pain, fever, N/V   EKG showing new onset AFib  CXR showing cardiomegaly but no vascular congestion present  Lipid panel on 01/12/2021 was WNL  Troponin times on WNL  BNP on admission 700  Continue taking metoprolol  Continue to hold Lasix and Entresto  Start IV Lasix 40 b i d  Daily weight  I's and O's  Salt restricted diet  Fluid restriction < 1500 mL  Will start on very gentle hydration 50 cc/hour normal saline due to suspicion of hypovolemia  Follow-up with HbA1c  Follow-up TSH  Continue trending troponin x2  Will call patient's cardiologist tomorrow morning

## 2021-01-13 NOTE — ED PROVIDER NOTES
History  Chief Complaint   Patient presents with    Evaluation of Abnormal Diagnostic Test     Referred by PCP for abnormal kidney function and hematuria     80-year-old male is brought in by his daughter for evaluation of abnormal labs  Patient had his routine blood work done yesterday and was told that his kidney function is declining  They were told that his declining kidney function is affecting his heart  Patient was started on Bactrim on Friday for a suspected urinary tract infection  Friday he began having hematuria  The hematuria has since resolved  Prior to Admission Medications   Prescriptions Last Dose Informant Patient Reported? Taking? FINASTERIDE PO 1/13/2021 at Unknown time  Yes Yes   Sig: Take 5 mg by mouth daily   aspirin (ECOTRIN LOW STRENGTH) 81 mg EC tablet 1/13/2021 at Unknown time  Yes Yes   Sig: Take 81 mg by mouth daily   furosemide (LASIX) 20 mg tablet 1/13/2021 at Unknown time  No Yes   Sig: Take 1 tablet by mouth daily for 30 days   Patient taking differently: Take 20 mg by mouth 2 (two) times a day    levothyroxine 50 mcg tablet 1/13/2021 at Unknown time  Yes Yes   Sig: Take 50 mcg by mouth daily   metoprolol succinate (TOPROL-XL) 25 mg 24 hr tablet 1/13/2021 at Unknown time  Yes Yes   Sig: Take 25 mg by mouth daily   sacubitril-valsartan (Entresto) 24-26 MG TABS 1/13/2021 at Unknown time  Yes Yes   Sig: Take 1 tablet by mouth 2 (two) times a day      Facility-Administered Medications: None       Past Medical History:   Diagnosis Date    Disease of thyroid gland     Hypertension        No past surgical history on file  No family history on file  I have reviewed and agree with the history as documented  E-Cigarette/Vaping     E-Cigarette/Vaping Substances     Social History     Tobacco Use    Smoking status: Former Smoker   Substance Use Topics    Alcohol use: No    Drug use: No       Review of Systems   Constitutional: Negative for fever     HENT: Negative for nosebleeds  Eyes: Negative for redness  Respiratory: Negative for shortness of breath  Cardiovascular: Negative for chest pain  Gastrointestinal: Negative for blood in stool  Genitourinary: Positive for hematuria  Musculoskeletal: Negative for gait problem  Skin: Negative for rash  Neurological: Negative for seizures  Psychiatric/Behavioral: Negative for behavioral problems  Physical Exam  Physical Exam  Constitutional:       Appearance: Normal appearance  HENT:      Head: Normocephalic and atraumatic  Nose: No rhinorrhea  Mouth/Throat:      Mouth: Mucous membranes are moist    Eyes:      Extraocular Movements: Extraocular movements intact  Neck:      Musculoskeletal: Normal range of motion  Cardiovascular:      Rate and Rhythm: Regular rhythm  Bradycardia present  Comments: Borderline bradycardia  Pulmonary:      Effort: Pulmonary effort is normal       Breath sounds: Normal breath sounds  No wheezing or rales  Musculoskeletal: Normal range of motion  Right lower leg: Edema (+1) present  Left lower leg: Edema (+1) present  Skin:     General: Skin is warm and dry  Neurological:      General: No focal deficit present  Mental Status: He is alert     Psychiatric:         Mood and Affect: Mood normal          Behavior: Behavior normal          Vital Signs  ED Triage Vitals [01/13/21 1358]   Temperature Pulse Respirations Blood Pressure SpO2   97 6 °F (36 4 °C) 64 20 133/60 98 %      Temp Source Heart Rate Source Patient Position - Orthostatic VS BP Location FiO2 (%)   Oral Monitor Lying Left arm --      Pain Score       --           Vitals:    01/13/21 1358 01/13/21 1705   BP: 133/60 (!) 112/42   Pulse: 64 55   Patient Position - Orthostatic VS: Lying Lying         Visual Acuity      ED Medications  Medications   sodium chloride 0 9 % infusion (125 mL/hr Intravenous New Bag 1/13/21 1427)   furosemide (LASIX) injection 20 mg (20 mg Intravenous Given 1/13/21 1700)       Diagnostic Studies  Results Reviewed     Procedure Component Value Units Date/Time    Osmolality-"If this is regarding a toxic alcohol, please STOP and consult medical  for further guidance " [[id number]] Collected: 01/13/21 1644    Lab Status:  In process Specimen: Blood from Arm, Left Updated: 01/13/21 1647    Creatinine, urine, random [[id number]]     Lab Status: No result Specimen: Urine     Osmolality, urine [[id number]]     Lab Status: No result Specimen: Urine     UA w Reflex to Microscopic w Reflex to Culture [[id number]]     Lab Status: No result Specimen: Urine     Sodium, urine, random [[id number]]     Lab Status: No result Specimen: Urine     B-Type Natriuretic Peptide [facility] ONLY) [[id number]]  (Abnormal) Collected: 01/13/21 1424    Lab Status: Final result Specimen: Blood from Arm, Left Updated: 01/13/21 1520      4 pg/mL     Troponin I [[id number]]  (Normal) Collected: 01/13/21 1424    Lab Status: Final result Specimen: Blood from Arm, Left Updated: 01/13/21 1520     Troponin I 0 04 ng/mL     Manual Differential(PHLEBS Do Not Order) [[id number]] Collected: 01/13/21 1424    Lab Status: Final result Specimen: Blood from Arm, Left Updated: 01/13/21 1515     Segmented % 61 %      Bands % 6 %      Lymphocytes % 27 %      Monocytes % 4 %      Eosinophils, % 2 %      Basophils % 0 %      Absolute Neutrophils 6 67 Thousand/uL      Lymphocytes Absolute 2 69 Thousand/uL      Monocytes Absolute 0 40 Thousand/uL      Eosinophils Absolute 0 20 Thousand/uL      Basophils Absolute 0 00 Thousand/uL      Total Counted 100     RBC Morphology Normal     Platelet Estimate Adequate    CBC and differential [[id number]]  (Abnormal) Collected: 01/13/21 1424    Lab Status: Final result Specimen: Blood from Arm, Left Updated: 01/13/21 1515     WBC 9 96 Thousand/uL      RBC 3 75 Million/uL      Hemoglobin 12 1 g/dL      Hematocrit 35 7 %      MCV 95 fL      MCH 32 3 pg      MCHC 33 9 g/dL RDW 14 8 %      MPV 10 9 fL      Platelets 321 Thousands/uL     Basic metabolic panel [[id number]]  (Abnormal) Collected: 01/13/21 1424    Lab Status: Final result Specimen: Blood from Arm, Left Updated: 01/13/21 1445     Sodium 128 mmol/L      Potassium 4 8 mmol/L      Chloride 97 mmol/L      CO2 18 mmol/L      ANION GAP 13 mmol/L      BUN 81 mg/dL      Creatinine 2 92 mg/dL      Glucose 158 mg/dL      Calcium 8 5 mg/dL      eGFR 18 ml/min/1 73sq m     Narrative:      [place] guidelines for Chronic Kidney Disease (CKD):     Stage 1 with normal or high GFR (GFR > 90 mL/min/1 73 square meters)    Stage 2 Mild CKD (GFR = 60-89 mL/min/1 73 square meters)    Stage 3A Moderate CKD (GFR = 45-59 mL/min/1 73 square meters)    Stage 3B Moderate CKD (GFR = 30-44 mL/min/1 73 square meters)    Stage 4 Severe CKD (GFR = 15-29 mL/min/1 73 square meters)    Stage 5 End Stage CKD (GFR <15 mL/min/1 73 square meters)  Note: GFR calculation is accurate only with a steady state creatinine    Magnesium [[id number]]  (Abnormal) Collected: 01/13/21 1424    Lab Status: Final result Specimen: Blood from Arm, Left Updated: 01/13/21 1445     Magnesium 2 8 mg/dL                  XR chest 2 views   Final Result by Vinh Goodrich MD (01/13 1551)   No acute cardiopulmonary disease  Stable appearance            Workstation performed: [id number]                    Procedures  Procedures         ED Course  ED Course as of Jan 13 1752 Wed Jan 13, 2021   1741 EKG performed at 5:41 p m  Interpreted by me shows atrial fibrillation with a ventricular response rate of 51, left axis deviation with potential left anterior fascicular block                                              MDM  Number of Diagnoses or Management Options  LUIS (acute kidney injury) ([facility] [address] ):    Hyponatremia:   Suspected congestive heart failure:   Diagnosis management comments: Patient's labs from [employer] [facility] showed that his creatinine in August of 2020 was 1 53, his GFR was 39  His labs yesterday showed a creatinine of 2 75 and a GFR of 19  Given his bilateral lower extremity swelling and elevated BNP, suspect patient has a bit of congestive heart failure associated with his kidney failure  His hyponatremia may be due to third-spacing of fluids       Amount and/or Complexity of Data Reviewed  Review and summarize past medical records: yes        Disposition  Final diagnoses:   LUIS ([facility]) ([facility] )   Hyponatremia   Suspected congestive heart failure     Time reflects when diagnosis was documented in both MDM as applicable and the Disposition within this note     Time User Action Codes Description Comment    1/13/2021  4:11 PM [place] Add [N17 9] LUIS (acute kidney injury) ([facility] [address] )     1/13/2021  4:11 PM Joselo Eagle Add [E87 1] Hyponatremia     1/13/2021  4:11 PM [place] Add [R09 89] Suspected congestive heart failure     1/13/2021  5:24 PM Gela Maya Add [I50 9] Acute on chronic heart failure, unspecified heart failure type [facility])       ED Disposition     ED Disposition Condition Date/Time Comment    Admit Stable Wed Jan 13, 2021  3:33 PM Case was discussed with Dr Vivienne Wu and the patient's admission status was agreed to be Admission Status: inpatient status to the service of Dr Vivienne Wu   Follow-up Information    None         Patient's Medications   Discharge Prescriptions    No medications on file     No discharge procedures on file      PDMP Review     None          ED Provider  Electronically Signed by           Elpidio Sy PA-C  01/13/21 Reji Watters PA-C  01/13/21 4036

## 2021-01-13 NOTE — ASSESSMENT & PLAN NOTE
EKG showing new onset AFib  Rate controlled        On heparin drip  Telemetry monitoring  Cardiology consult  Continue metoprolol

## 2021-01-13 NOTE — ASSESSMENT & PLAN NOTE
Sodium of 128 on admission secondary to volume overload      Continue diuretics  Follow-up with urine electrolytes

## 2021-01-14 ENCOUNTER — APPOINTMENT (INPATIENT)
Dept: ULTRASOUND IMAGING | Facility: HOSPITAL | Age: 86
DRG: 683 | End: 2021-01-14
Payer: MEDICARE

## 2021-01-14 PROBLEM — I50.32 CHRONIC DIASTOLIC CONGESTIVE HEART FAILURE (HCC): Status: ACTIVE | Noted: 2021-01-13

## 2021-01-14 LAB
ANION GAP SERPL CALCULATED.3IONS-SCNC: 10 MMOL/L (ref 4–13)
APTT PPP: 78 SECONDS (ref 23–37)
APTT PPP: 79 SECONDS (ref 23–37)
ATRIAL RATE: 0 BPM
BACTERIA UR QL AUTO: ABNORMAL /HPF
BILIRUB UR QL STRIP: NEGATIVE
BUN SERPL-MCNC: 74 MG/DL (ref 6–20)
CALCIUM SERPL-MCNC: 8.5 MG/DL (ref 8.4–10.2)
CHLORIDE SERPL-SCNC: 103 MMOL/L (ref 96–108)
CLARITY UR: CLEAR
CO2 SERPL-SCNC: 21 MMOL/L (ref 22–33)
COLOR UR: YELLOW
CREAT SERPL-MCNC: 2.71 MG/DL (ref 0.5–1.2)
CREAT UR-MCNC: 14.3 MG/DL
EST. AVERAGE GLUCOSE BLD GHB EST-MCNC: 114 MG/DL
GFR SERPL CREATININE-BSD FRML MDRD: 20 ML/MIN/1.73SQ M
GLUCOSE SERPL-MCNC: 91 MG/DL (ref 65–140)
GLUCOSE UR STRIP-MCNC: NEGATIVE MG/DL
HBA1C MFR BLD: 5.6 %
HGB UR QL STRIP.AUTO: ABNORMAL
KETONES UR STRIP-MCNC: NEGATIVE MG/DL
LEUKOCYTE ESTERASE UR QL STRIP: NEGATIVE
NITRITE UR QL STRIP: NEGATIVE
NON-SQ EPI CELLS URNS QL MICRO: ABNORMAL /HPF
PH UR STRIP.AUTO: 6 [PH]
POTASSIUM SERPL-SCNC: 4.8 MMOL/L (ref 3.5–5)
PR INTERVAL: 210 MS
PROT UR STRIP-MCNC: NEGATIVE MG/DL
QRS AXIS: -49 DEGREES
QRSD INTERVAL: 111 MS
QT INTERVAL: 450 MS
QTC INTERVAL: 443 MS
RBC #/AREA URNS AUTO: ABNORMAL /HPF
SODIUM 24H UR-SCNC: 66 MOL/L
SODIUM SERPL-SCNC: 134 MMOL/L (ref 133–145)
SP GR UR STRIP.AUTO: 1.01 (ref 1–1.03)
T WAVE AXIS: 78 DEGREES
UROBILINOGEN UR QL STRIP.AUTO: 0.2 E.U./DL
VENTRICULAR RATE: 58 BPM
WBC #/AREA URNS AUTO: ABNORMAL /HPF

## 2021-01-14 PROCEDURE — 85730 THROMBOPLASTIN TIME PARTIAL: CPT | Performed by: INTERNAL MEDICINE

## 2021-01-14 PROCEDURE — 97166 OT EVAL MOD COMPLEX 45 MIN: CPT

## 2021-01-14 PROCEDURE — 99232 SBSQ HOSP IP/OBS MODERATE 35: CPT | Performed by: INTERNAL MEDICINE

## 2021-01-14 PROCEDURE — 93010 ELECTROCARDIOGRAM REPORT: CPT | Performed by: INTERNAL MEDICINE

## 2021-01-14 PROCEDURE — 93005 ELECTROCARDIOGRAM TRACING: CPT

## 2021-01-14 PROCEDURE — 97163 PT EVAL HIGH COMPLEX 45 MIN: CPT

## 2021-01-14 PROCEDURE — 87205 SMEAR GRAM STAIN: CPT | Performed by: INTERNAL MEDICINE

## 2021-01-14 PROCEDURE — 99223 1ST HOSP IP/OBS HIGH 75: CPT | Performed by: INTERNAL MEDICINE

## 2021-01-14 PROCEDURE — 2Y41X5Z PACKING OF NASAL REGION USING PACKING MATERIAL: ICD-10-PCS | Performed by: INTERNAL MEDICINE

## 2021-01-14 PROCEDURE — 81001 URINALYSIS AUTO W/SCOPE: CPT | Performed by: INTERNAL MEDICINE

## 2021-01-14 PROCEDURE — 99222 1ST HOSP IP/OBS MODERATE 55: CPT | Performed by: INTERNAL MEDICINE

## 2021-01-14 PROCEDURE — 76770 US EXAM ABDO BACK WALL COMP: CPT

## 2021-01-14 PROCEDURE — 80048 BASIC METABOLIC PNL TOTAL CA: CPT | Performed by: INTERNAL MEDICINE

## 2021-01-14 RX ORDER — SODIUM CHLORIDE 9 MG/ML
50 INJECTION, SOLUTION INTRAVENOUS CONTINUOUS
Status: DISCONTINUED | OUTPATIENT
Start: 2021-01-14 | End: 2021-01-15

## 2021-01-14 RX ORDER — TAMSULOSIN HYDROCHLORIDE 0.4 MG/1
0.4 CAPSULE ORAL
Status: DISCONTINUED | OUTPATIENT
Start: 2021-01-14 | End: 2021-01-16 | Stop reason: HOSPADM

## 2021-01-14 RX ADMIN — FINASTERIDE 5 MG: 5 TABLET, FILM COATED ORAL at 08:18

## 2021-01-14 RX ADMIN — METOPROLOL SUCCINATE 25 MG: 25 TABLET, EXTENDED RELEASE ORAL at 08:18

## 2021-01-14 RX ADMIN — SODIUM CHLORIDE 50 ML/HR: 0.9 INJECTION, SOLUTION INTRAVENOUS at 11:03

## 2021-01-14 RX ADMIN — LEVOTHYROXINE SODIUM 50 MCG: 50 TABLET ORAL at 08:18

## 2021-01-14 RX ADMIN — TAMSULOSIN HYDROCHLORIDE 0.4 MG: 0.4 CAPSULE ORAL at 16:03

## 2021-01-14 RX ADMIN — ASPIRIN 81 MG: 81 TABLET, COATED ORAL at 08:18

## 2021-01-14 NOTE — ASSESSMENT & PLAN NOTE
Creatinine 2 9 improved to 2 7 after IV fluid  Baseline 1 1-1 2  H/o taking Bactrim for UTI since last 1 week which may have exacerbated LUIS  Suspicion of interstitial nephritis  Pt here with creatinine of 2 9 probably due to prerenal in origin  Baseline 1 1-1 2  Elevated BUN of 90 on admission  Pt was taking Bactrim since last 1 week which may have exacerbated LUIS  Called patient's cardiologist stating Pt having similar GFR so there is suspicion that Pt has baseline elevated creatinine however will confirm tomorrow morning  Plan  -nephrology consulted recommended urine eosinophil, bladder US, UA  -follow-up with bladder scan   -continue hydration gently 50 cc/hour  -hold Lasix and Entresto  Avoid nephrotoxic drugs  BMP in a m

## 2021-01-14 NOTE — ASSESSMENT & PLAN NOTE
Uses walker at home    Follow-up with PT OT  Supportive measures  Fall precautions  Activity with assistance

## 2021-01-14 NOTE — CONSULTS
Consultation - Cardiology   Keagan Yates 80 y o  male MRN: [id number]  Unit/Bed#: -01 Encounter: [id number]      Assessment:  Principal Problem:    Chronic diastolic congestive heart failure ([facility] [address] )  Active Problems:    Hypertension    Hypothyroidism    LUIS (acute kidney injury) ([facility] [address] )    Hyponatremia    New onset a-fib (HCC)    Ambulatory dysfunction    BPH (benign prostatic hyperplasia)      Plan:    1  Atrial fibrillation - Presumed to be new onset  Heart rates controlled bradycardic  At this point I would discontinue Toprol and leave him off of it for now and see where his heart rates settle at off of AV edward blocker therapy  Anticoagulation is recommended and once his LUIS improves suggest starting Eliquis  2   Chronic systolic CHF - On exam he is mildly volume overloaded, but we do not have a baseline to compare to  Likely associated with his LUIS  Entresto currently on hold and would suggest holding Lasix as well  Patient will need close outpatient follow-up with his primary cardiologist, Dr Lea Rossi  3   LUIS on CKD - Likely a combination of pre renal and being on Bactrim for a recent urinary tract infection  Nephrology consulted  Hold all nephrotoxic meds as recommended  History of Present Illness   Physician Requesting Consult: Richmond Castañeda MD  Reason for Consult / Principal Problem:  Atrial fibrillation    HPI: Nanci Amado is a 80y o  year old male who presents with abnormalities in his laboratory results, acute kidney injury, that was checked by his PCP and then it was recommended for him to come over to the hospital   His only symptoms as of late were symptoms consistent with urinary tract infection that included dysuria and hematuria, and he was recently started on Bactrim  He was not convey any cardiac symptoms  No chest pain or any symptoms of angina  He denies shortness of breath or any changes from a congestive heart failure standpoint    He denies any lightheadedness, presyncope or syncope  No palpitations  His cardiac history includes chronic systolic congestive heart failure with a reported ejection fraction of 35%  We do not have any records for review  For his congestive heart failure he was on Toprol-XL and Entresto  There was no report of prior history of atri fibrillati, although the patient does tell me that he does carry a history of an "abnormal heart rhythm "    Consults    Review of Systems:  Please refer to the HPI for all cardiac and pulmonary review of systems  Other than his urinary symptoms, that included dysuria and hematuria, all other 10 systems were reviewed      Historical Information   Past Medical History:   Diagnosis Date    Disease of thyroid gland     Hypertension      No past surgical history on file    Social History     Substance and Sexual Activity   Alcohol Use No     Social History     Substance and Sexual Activity   Drug Use No     Social History     Tobacco Use   Smoking Status Former Smoker     Family History: non-contributory    Meds/Allergies   all current active meds have been reviewed  No Known Allergies      Current Facility-Administered Medications:     acetaminophen (TYLENOL) tablet 650 mg, 650 mg, Oral, Q4H PRN, Jeniffer Bardales MD    aspirin (ECOTRIN LOW STRENGTH) EC tablet 81 mg, 81 mg, Oral, Daily, Jeniffer Bardales MD, 81 mg at 01/14/21 0818    finasteride (PROSCAR) tablet 5 mg, 5 mg, Oral, Daily, Jeniffer Bardales MD, 5 mg at 01/14/21 0818    heparin (porcine) 25,000 units in 0 45% NaCl 250 mL infusion (premix), 3-20 Units/kg/hr (Order-Specific), Intravenous, Titrated, Jeniffer Bardales MD, Last Rate: 9 mL/hr at 01/14/21 0237, 12 Units/kg/hr at 01/14/21 0237    heparin (porcine) injection 2,000 Units, 2,000 Units, Intravenous, Q1H PRN, Jeniffer Bardales MD    heparin (porcine) injection 4,000 Units, 4,000 Units, Intravenous, Q1H PRN, Jeniffer Bardales MD    levothyroxine tablet 50 mcg, 50 mcg, Oral, Daily, Steve Little MD, 50 mcg at 01/14/21 0818    ondansetron (ZOFRAN) injection 4 mg, 4 mg, Intravenous, Q6H PRN, Steve Little MD    sodium chloride 0 9 % infusion, 50 mL/hr, Intravenous, Continuous, Steve Little MD, Last Rate: 50 mL/hr at 01/14/21 1103, 50 mL/hr at 01/14/21 1103    tamsulosin (FLOMAX) capsule 0 4 mg, 0 4 mg, Oral, Daily With Donna Hayes MD    Objective   Vitals: Blood pressure 170/69, pulse 58, temperature 97 9 °F (36 6 °C), temperature source Tympanic, resp  rate 16, height 5' 5" (1 651 m), weight 75 8 kg (167 lb), SpO2 96 %  , Body mass index is 27 79 kg/m² ,   Orthostatic Blood Pressures      Most Recent Value   Blood Pressure  170/69 filed at 01/14/2021 4348   Patient Position - Orthostatic VS  Lying filed at 01/14/2021 5299            Intake/Output Summary (Last 24 hours) at 1/14/2021 1114  Last data filed at 1/14/2021 1001  Gross per 24 hour   Intake    Output 1100 ml   Net -1100 ml       Physical Exam:  GEN: [place] Don[place]io appears well, alert and oriented x 3, pleasant and cooperative   HEENT: pupils equal, round, and reactive to light; extraocular muscles intact  NECK: supple, no carotid bruits  +JVD   HEART: irregular rhythm, Kvng S1 and S2, no murmurs, clicks, gallops or rubs   LUNGS:  Diminished at bases bilaterally; no wheezes, rales, or rhonchi   ABDOMEN: normal bowel sounds, soft, no tenderness, no distention  EXTREMITIES: peripheral pulses normal; no clubbing, cyanosis    + edema bilaterally  NEURO: no focal findings   SKIN: normal without suspicious lesions on exposed skin    Lab Results:   Admission on 01/13/2021   Component Date Value    WBC 01/13/2021 9 96     RBC 01/13/2021 3 75*    Hemoglobin 01/13/2021 12 1     Hematocrit 01/13/2021 35 7*    MCV 01/13/2021 95     MCH 01/13/2021 32 3     MCHC 01/13/2021 33 9     RDW 01/13/2021 14 8     MPV 01/13/2021 10 9     Platelets 53/94/1584 329     BNP 01/13/2021 771 4*    Troponin I 01/13/2021 0 04     Magnesium 01/13/2021 2 8*    Sodium 01/13/2021 128*    Potassium 01/13/2021 4 8     Chloride 01/13/2021 97     CO2 01/13/2021 18*    ANION GAP 01/13/2021 13     BUN 01/13/2021 81*    Creatinine 01/13/2021 2 92*    Glucose 01/13/2021 158*    Calcium 01/13/2021 8 5     eGFR 01/13/2021 18     Segmented % 01/13/2021 61     Bands % 01/13/2021 6     Lymphocytes % 01/13/2021 27     Monocytes % 01/13/2021 4     Eosinophils, % 01/13/2021 2     Basophils % 01/13/2021 0     Absolute Neutrophils 01/13/2021 6 67     Lymphocytes Absolute 01/13/2021 2 69     Monocytes Absolute 01/13/2021 0 40     Eosinophils Absolute 01/13/2021 0 20     Basophils Absolute 01/13/2021 0 00     Total Counted 01/13/2021 100     RBC Morphology 01/13/2021 Normal     Platelet Estimate 57/85/3694 Adequate     Osmolality Serum 01/13/2021 299*    Osmolality, Ur 01/13/2021 271     Sodium, Ur 01/13/2021 66     Creatinine, Ur 01/13/2021 14 3     Ventricular Rate 01/13/2021 58     Atrial Rate 01/13/2021 0     IA Interval 01/13/2021 210     QRSD Interval 01/13/2021 111     QT Interval 01/13/2021 450     QTC Interval 01/13/2021 443     QRS Axis 01/13/2021 -52     T Wave Axis 01/13/2021 78     Ventricular Rate 01/13/2021 58     Atrial Rate 01/13/2021 0     IA Interval 01/13/2021 210     QRSD Interval 01/13/2021 111     QT Interval 01/13/2021 450     QTC Interval 01/13/2021 443     QRS Axis 01/13/2021 -52     T Wave Axis 01/13/2021 78     TSH 3RD GENERATON 01/13/2021 3 322     Hemoglobin A1C 01/13/2021 5 6     EAG 01/13/2021 114     Troponin I 01/13/2021 0 03     PTT 01/13/2021 27     Protime 01/13/2021 11 4     INR 01/13/2021 1 01     Troponin I 01/13/2021 0 04     PTT 01/14/2021 79*    Sodium 01/14/2021 134     Potassium 01/14/2021 4 8     Chloride 01/14/2021 103     CO2 01/14/2021 21*    ANION GAP 01/14/2021 10     BUN 01/14/2021 74*    Creatinine 01/14/2021 2 71*    Glucose 01/14/2021 91     Calcium 01/14/2021 8 5     eGFR 01/14/2021 20     Ventricular Rate 01/13/2021 58     Atrial Rate 01/13/2021 0     ID Interval 01/13/2021 210     QRSD Interval 01/13/2021 111     QT Interval 01/13/2021 450     QTC Interval 01/13/2021 443     QRS Axis 01/13/2021 -52     T Wave Axis 01/13/2021 78      Labs & Results:    Results from last 7 days   Lab Units 01/13/21  2205 01/13/21  1840 01/13/21  1424   TROPONIN I ng/mL 0 04 0 03 0 04     Results from last 7 days   Lab Units 01/13/21  1424   WBC Thousand/uL 9 96   HEMOGLOBIN g/dL 12 1   HEMATOCRIT % 35 7*   PLATELETS Thousands/uL 329         Results from last 7 days   Lab Units 01/14/21  0554 01/13/21  1424   POTASSIUM mmol/L 4 8 4 8   CHLORIDE mmol/L 103 97   CO2 mmol/L 21* 18*   BUN mg/dL 74* 81*   CREATININE mg/dL 2 71* 2 92*   CALCIUM mg/dL 8 5 8 5     Results from last 7 days   Lab Units 01/14/21  0056 01/13/21  1846   INR   --  1 01   PTT seconds 79* 27     Results from last 7 days   Lab Units 01/13/21  1424   MAGNESIUM mg/dL 2 8*         Imaging: I have personally reviewed pertinent films in PACS  Xr Chest 2 Views    Result Date: 1/13/2021  Narrative: CHEST INDICATION:   r/o CHF  COMPARISON:  1/23/2017 EXAM PERFORMED/VIEWS:  XR CHEST PA & LATERAL Images: 3 FINDINGS: Persistent elevation left hemidiaphragm Cardiomediastinal silhouette appears unremarkable  The lungs are clear  No pneumothorax or pleural effusion  Osseous structures appear within normal limits for patient age  Impression: No acute cardiopulmonary disease  Stable appearance Workstation performed: [id number]       EKG:  Artifact noted - however atrial fibrillation was seen with evidence of a possible prior inferior and anterior infarct  Telemetry review:  Atrial fibrillation with slow ventricular response  Counseling / Coordination of Care  Total floor / unit time spent today 40 minutes    Greater than 50% of total time was spent with the patient and / or family counseling and / or coordination of care

## 2021-01-14 NOTE — CONSULTS
Consultation - Nephrology   Cody Yates 80 y o  male MRN: [id number]  Unit/Bed#: -01 Encounter: [id number]    Inpatient consult to Nephrology  Consult performed by: Edwin Owens MD  Consult ordered by: Judy Tapia MD          History of Present Illness   Physician Requesting Consult: Maikol Denson MD  Reason for Consult / Principal Problem:  Acute kidney injury on top CKD stage 3     HPI: Johnny Potter is a 80y o  year old male with history of hypothyroidism/hypertension/CHF who presents with an elevated creatinine and hyponatremia from the PCPs office  Apparently the patient was in his usual state of health until about 1 week ago when he had some dysuria and hematuria was subsequently placed on Bactrim for 1 week  The symptoms have resolved  He also was found to have new onset atrial fibrillation  He denies having any history of chronic kidney disease  Denies any kidney stones  He does have enlarged prostate for which he takes finasteride  He denies frequent urinary tract infections  He denies NSAID use  He is maintained on Entresto twice a day  He is maintained on furosemide 20 mg daily  The patient now feels well denying any nausea vomiting or diarrhea  No fevers or chills  No chest pain or shortness of breath  Chronic leg swelling    His creatinine is as follows:  · 1 24 at this was from 2017  · 2 92 on admission on 01/13  · 2 71 today  His sodium on admission was 128 now 134  Chest x-ray revealed no acute disease          History obtained from the patient, the chart and the old records which I completely reviewed        Review of systems:  General:  No fevers chills and overall feeling well  Cardiovascular:  See HPI  Respiratory:  No wheezing or coughing  Gastrointestinal:  No nausea vomiting or diarrhea  Genitourinary:  See HPI  Neuro:  No headaches or dizziness or lightheadedness  All other systems were reviewed and are negative    Historical Information   Past Medical History:   Diagnosis Date    Disease of thyroid gland     Hypertension      No past surgical history on file  Social History   Social History     Substance and Sexual Activity   Alcohol Use No     Social History     Substance and Sexual Activity   Drug Use No     Social History     Tobacco Use   Smoking Status Former Smoker     No family history on file      Meds/Allergies   all current active meds have been reviewed, current meds:   Current Facility-Administered Medications   Medication Dose Route Frequency    acetaminophen (TYLENOL) tablet 650 mg  650 mg Oral Q4H PRN    aspirin (ECOTRIN LOW STRENGTH) EC tablet 81 mg  81 mg Oral Daily    finasteride (PROSCAR) tablet 5 mg  5 mg Oral Daily    heparin (porcine) 25,000 units in 0 45% NaCl 250 mL infusion (premix)  3-20 Units/kg/hr (Order-Specific) Intravenous Titrated    heparin (porcine) injection 2,000 Units  2,000 Units Intravenous Q1H PRN    heparin (porcine) injection 4,000 Units  4,000 Units Intravenous Q1H PRN    levothyroxine tablet 50 mcg  50 mcg Oral Daily    metoprolol succinate (TOPROL-XL) 24 hr tablet 25 mg  25 mg Oral Daily    ondansetron (ZOFRAN) injection 4 mg  4 mg Intravenous Q6H PRN    sodium chloride 0 9 % infusion  50 mL/hr Intravenous Continuous    and PTA meds:    Medications Prior to Admission   Medication    aspirin (ECOTRIN LOW STRENGTH) 81 mg EC tablet    FINASTERIDE PO    furosemide (LASIX) 20 mg tablet    levothyroxine 50 mcg tablet    metoprolol succinate (TOPROL-XL) 25 mg 24 hr tablet    sacubitril-valsartan (Entresto) 24-26 MG TABS       No Known Allergies    Objective     Intake/Output Summary (Last 24 hours) at 1/14/2021 0752  Last data filed at 1/13/2021 1814  Gross per 24 hour   Intake    Output 300 ml   Net -300 ml     Patient Vitals for the past 24 hrs:   BP Temp Temp src Pulse Resp SpO2 Height Weight   01/14/21 0600        75 8 kg (167 lb)   01/14/21 0341 102/55 98 °F (36 7 °C) Tympanic (!) 51 16 96 %     01/13/21 2353 118/54 97 9 °F (36 6 °C) Tympanic (!) 54 18 97 %     01/13/21 1806 130/61 97 5 °F (36 4 °C) Tympanic 56 18 96 % 5' 5" (1 651 m) 77 8 kg (171 lb 8 3 oz)   01/13/21 1705 (!) 112/42   55 19 98 %     01/13/21 1358 133/60 97 6 °F (36 4 °C) Oral 64 20 98 % 5' 5" (1 651 m) 79 8 kg (176 lb)       Invasive Devices:      Vitals:    01/14/21 0341   BP: 102/55   Pulse: (!) 51   Resp: 16   Temp: 98 °F (36 7 °C)   SpO2: 96%     General:  Well-developed well-nourished no acute distress  Skin:  No acute rash  Eyes:  No scleral icterus and noninjected  ENT:  Normocephalic/atraumatic, mucous membranes moist  Neck:  Supple, no jugular venous distention, no carotid bruits, 1+ carotid upstroke, no thyromegaly and trachea midline  Back:  No CVA tenderness  Chest:  Clear to auscultation percussion, no use of accessory respiratory muscles and good respiratory effort  CVS:  Regular rate and rhythm without a murmur rub or gallops appreciable  Abdomen:  Soft and nontender with normal bowel sounds, no hepatosplenomegaly or bruits, no masses  Extremities:  No clubbing, no cyanosis, 1+ lower extremity edema 1/2 the way up the pretibial region pitting; mild arthritic changes; no femoral bruits; poor distal pulses  Neuro:  No gross focality  Psych:  Alert, oriented and appropriate    Current Weight: Weight - Scale: 75 8 kg (167 lb)  First Weight: Weight - Scale: 79 8 kg (176 lb)    Lab Results:  I have personally reviewed pertinent labs    CBC:   Lab Results   Component Value Date    WBC 9 96 01/13/2021    RBC 3 75 (L) 01/13/2021     CMP:   Lab Results   Component Value Date     01/14/2021    CO2 21 (L) 01/14/2021    BUN 74 (H) 01/14/2021    CREATININE 2 71 (H) 01/14/2021    CALCIUM 8 5 01/14/2021    AST 28 01/24/2017    ALT 66 01/24/2017    ALKPHOS 54 01/24/2017    EGFR 20 01/14/2021     Phosphorus: No results found for: PHOS  Magnesium:   Lab Results   Component Value Date    MG 2 8 (H) 01/13/2021 Urinalysis:   Lab Results   Component Value Date    COLORU Annalise 04/25/2019    CLARITYU Turbid 04/25/2019    SPECGRAV 1 021 04/25/2019    PHUR 6 0 04/25/2019    PHUR 5 0 01/23/2017    LEUKOCYTESUR Small (A) 04/25/2019    NITRITE Negative 04/25/2019    GLUCOSEU Negative 04/25/2019    KETONESU Trace (A) 04/25/2019    BILIRUBINUR Interference- unable to analyze (A) 04/25/2019    BLOODU Large (A) 04/25/2019     BMP:   Lab Results   Component Value Date    SODIUM 134 01/14/2021    CO2 21 (L) 01/14/2021    BUN 74 (H) 01/14/2021    CREATININE 2 71 (H) 01/14/2021    CALCIUM 8 5 01/14/2021     ABGs: No results found for: [facility]  Radiology review:   Chest x-ray or demonstrates no active disease        Assessment/Plan   31-year-old male with history of CHF and hypothyroidism who presents with acute kidney injury and hypernatremia after having been placed on Bactrim for urinary tract infection:    1  AK I/POA on top of CKD stage 3:  · CKD stage 3:  Baseline creatinine is approximately 1 3 that this is from 2017 we have no recent lab work  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  · AK I:  Etiology is most compatible with prerenal on Entresto with abnormal autoregulatory response and on furosemide and relatively mild hypotension both from his medications possibly from new onset atrial fibrillation  Certainly he can have a decrease and secretion of creatinine in the proximal tubule; as well as acute interstitial nephritis from Bactrim  Certainly rule out obstructive uropathy  Recommend:  Workup:  · UA:  · PVR bladder scans  · Urine sodium 66 but this was on furosemide  · Obtain urine for eosinophils  · Renal ultrasound  · OBTAIN ALL RECORDS    Treatment:  · Hold Entresto and furosemide for now  · Hold Bactrim  · Status post low IV fluids for now Hep-Lock  · Monitor labs  · At some point patient will require re-initiation of diuretics      2  Hyponatremia:  Most compatible with prerenal as it has improved with isotonic saline  · Just monitor with fluid restriction  · Recheck labs in a m  Discussed with medical team in particular regarding treatment for AK I    Portions of the record may have been created with voice recognition software  Occasional wrong word or "sound a like" substitutions may have occurred due to the inherent limitations of voice recognition software  Read the chart carefully and recognize, using context, where substitutions have occurred

## 2021-01-14 NOTE — PLAN OF CARE
Problem: Potential for Falls  Goal: Patient will remain free of falls  Description: INTERVENTIONS:  - Assess patient frequently for physical needs  -  Identify cognitive and physical deficits and behaviors that affect risk of falls    -  [place] fall precautions as indicated by assessment   - Educate patient/family on patient safety including physical limitations  - Instruct patient to call for assistance with activity based on assessment  - Modify environment to reduce risk of injury  - Consider OT/PT consult to assist with strengthening/mobility  Outcome: Progressing     Problem: PAIN - ADULT  Goal: Verbalizes/displays adequate comfort level or baseline comfort level  Description: Interventions:  - Encourage patient to monitor pain and request assistance  - Assess pain using appropriate pain scale  - Administer analgesics based on type and severity of pain and evaluate response  - Implement non-pharmacological measures as appropriate and evaluate response  - Consider cultural and social influences on pain and pain management  - Notify physician/advanced practitioner if interventions unsuccessful or patient reports new pain  Outcome: Progressing     Problem: INFECTION - ADULT  Goal: Absence or prevention of progression during hospitalization  Description: INTERVENTIONS:  - Assess and monitor for signs and symptoms of infection  - Monitor lab/diagnostic results  - Monitor all insertion sites, i e  indwelling lines, tubes, and drains  - Monitor endotracheal if appropriate and nasal secretions for changes in amount and color  - [place] appropriate cooling/warming therapies per order  - Administer medications as ordered  - Instruct and encourage patient and family to use good hand hygiene technique  - Identify and instruct in appropriate isolation precautions for identified infection/condition  Outcome: Progressing  Goal: Absence of fever/infection during neutropenic period  Description: INTERVENTIONS:  - Monitor WBC    Outcome: Progressing     Problem: SAFETY ADULT  Goal: Maintain or return to baseline ADL function  Description: INTERVENTIONS:  -  Assess patient's ability to carry out ADLs; assess patient's baseline for ADL function and identify physical deficits which impact ability to perform ADLs (bathing, care of mouth/teeth, toileting, grooming, dressing, etc )  - Assess/evaluate cause of self-care deficits   - Assess range of motion  - Assess patient's mobility; develop plan if impaired  - Assess patient's need for assistive devices and provide as appropriate  - Encourage maximum independence but intervene and supervise when necessary  - Involve family in performance of ADLs  - Assess for home care needs following discharge   - Consider OT consult to assist with ADL evaluation and planning for discharge  - Provide patient education as appropriate  Outcome: Progressing  Goal: Maintain or return mobility status to optimal level  Description: INTERVENTIONS:  - Assess patient's baseline mobility status (ambulation, transfers, stairs, etc )    - Identify cognitive and physical deficits and behaviors that affect mobility  - Identify mobility aids required to assist with transfers and/or ambulation (gait belt, sit-to-stand, lift, walker, cane, etc )  - [place] fall precautions as indicated by assessment  - Record patient progress and toleration of activity level on Mobility SBAR; progress patient to next Phase/Stage  - Instruct patient to call for assistance with activity based on assessment  - Consider rehabilitation consult to assist with strengthening/weightbearing, etc   Outcome: Progressing     Problem: DISCHARGE PLANNING  Goal: Discharge to home or other facility with appropriate resources  Description: INTERVENTIONS:  - Identify barriers to discharge w/patient and caregiver  - Arrange for needed discharge resources and transportation as appropriate  - Identify discharge learning needs (meds, wound care, etc )  - Arrange for interpretive services to assist at discharge as needed  - Refer to Case Management Department for coordinating discharge planning if the patient needs post-hospital services based on physician/advanced practitioner order or complex needs related to functional status, cognitive ability, or social support system  Outcome: Progressing     Problem: Knowledge Deficit  Goal: Patient/family/caregiver demonstrates understanding of disease process, treatment plan, medications, and discharge instructions  Description: Complete learning assessment and assess knowledge base    Interventions:  - Provide teaching at level of understanding  - Provide teaching via preferred learning methods  Outcome: Progressing

## 2021-01-14 NOTE — ASSESSMENT & PLAN NOTE
EKG showing new onset AFib  Rate controlled        On heparin drip  Telemetry monitoring  Cardiology consult  Hold metoprolol due to bradycardia

## 2021-01-14 NOTE — QUICK NOTE
Pt having 1 episode of nosebleed using very small amount of blood  Start with nasal packing  Heparin was discontinued   Will start on Eliquis if possible from tomorrow morning

## 2021-01-14 NOTE — NURSING NOTE
[facility] 23:00 p m  [facility]  Pt is awake,alert oriented x4  Pt denies pain,denies Chest pain and any chest discomfort even  While his HR go down to 37-38- DR Robbins Oh is aware of it  EKG was done  Pt refuses bed/chair alarm

## 2021-01-14 NOTE — ASSESSMENT & PLAN NOTE
Wt Readings from Last 3 Encounters:   01/14/21 75 8 kg (167 lb)   01/23/17 97 7 kg (215 lb 6 2 oz)   05/03/16 95 3 kg (210 lb)   Following up with cardiologist Dr Berna Heard  Pt not sure about last echocardiogram states around 3 years ago  Previous admission for acute onset diastolic heart failure  Looks hypovolemic on presentation  B/L LE pitting edema found on examination  Admitted for LUIS  EKG showing new onset AFib  CXR showing cardiomegaly but no vascular congestion present  Lipid panel WN and   Troponin times on WNL  BNP on admission 700  Had episode of bradycardia with HR in 40s overnight  Will discontinue metoprolol as per Cardiology recommendation  Continue monitoring BP off metoprolol  Cardiology on board, recommendation appreciated  Will start on Eliquis 1 kidney function stabilized  Continue heparin drip as per protocol  Continue to hold Lasix and Entresto  Daily weight  I's and O's  Salt restricted diet  Will call patient's cardiologist tomorrow morning

## 2021-01-14 NOTE — PHYSICAL THERAPY NOTE
PHYSICAL THERAPY EVALUATION  Time: 9024-6485    NAME:  Grant Yates  DATE: 01/14/21    AGE:   80 y o  Mrn:   [id number]  Length Of Stay: 1    ADMIT DX:  Hyponatremia [E87 1]  New onset atrial fibrillation (HCC) [I48 91]  Abnormal laboratory test result [R89 9]  Suspected congestive heart failure [R09 89]  LUIS (acute kidney injury) ([facility] [address] ) [N17 9]  Acute on chronic heart failure, unspecified heart failure type ([facility] [address] ) [I50 9]    Past Medical History:   Diagnosis Date    Disease of thyroid gland     Hypertension      No past surgical history on file  Performed at least 2 patient identifiers during session: Name and ID bracelet        01/14/21 0947   PT Last Visit   PT Visit Date 01/14/21   Note Type   Note type Evaluation   Pain Assessment   Pain Assessment Tool 0-10   Pain Score No Pain   Effect of Pain on Daily Activities n/a   Hospital Pain Intervention(s) Ambulation/increased activity   Multiple Pain Sites No   Home Living   Type of [address] [facility] One level;Ramped entrance  (ramp entrance, [facility]  )   Bathroom Shower/Tub Tub/shower unit   Bathroom Toilet Standard   Bathroom Equipment Tub transfer bench;Grab bars in shower;Grab bars around toilet   Bathroom Accessibility Accessible via walker   [address] [facility]   Prior Function   Level of [address] with ADLs and functional mobility; Needs assistance with IADLs   Lives With Alone   Receives Help From Family  (pt reports that his daughter visits daily to assist w/ needs)   ADL Assistance Independent   IADLs Needs assistance   Falls in the last 6 months 0  (pt denies )   Vocational Retired   Comments Pt reports living at home alone in a [facility] with ramp entrance  Pt reports being indep with all ADLs, has assist from his daughter with IADLs  Ambulates household distances with RW  Denies h/o falls  Restrictions/Precautions   Weight Bearing Precautions Per Order No   Other Precautions Multiple lines;Telemetry; Fall Risk;Bed Alarm;Chair Alarm   General   Additional Pertinent History Pt admitted 1/13/2021 sent by PCP for abnormal labs and hematuria  Dx: acute on chronic CHF, new onset Afib, LUIS  Family/Caregiver Present No   Cognition   Overall Cognitive Status WFL   Arousal/Participation Alert   Orientation Level Oriented to person;Oriented to place;Oriented to situation;Disoriented to time   Memory Within functional limits   Following Commands Follows one step commands without difficulty   RUE Assessment   RUE Assessment WFL   LUE Assessment   LUE Assessment WFL   RLE Assessment   RLE Assessment WFL   LLE Assessment   LLE Assessment WFL   Coordination   Movements are Fluid and Coordinated 1   Sensation WFL   Light Touch   RLE Light Touch Grossly intact   LLE Light Touch Grossly intact   Sharp/Dull   RLE Sharp/Dull Not tested   LLE Sharp/Dull Not tested   Proprioception   RLE Proprioception Not tested   LLE Proprioception Not tested   Bed Mobility   Supine to Sit 5  Supervision   Additional items HOB elevated; Bedrails; Increased time required   Sit to Supine 5  Supervision   Additional items HOB elevated; Bedrails; Increased time required   Transfers   Sit to Stand 5  Supervision   Additional items Assist x 1   Stand to Sit 5  Supervision   Additional items Assist x 1   Toilet transfer 5  Supervision   Additional items Assist x 1;Standard toilet; Other  (grab bars (has at home) )   Ambulation/Elevation   Gait pattern Forward Flexion;Decreased foot clearance; Wide MELONY; Short stride   Gait Assistance 5  Supervision   Additional items Assist x 1;Verbal cues   Assistive Device Rolling walker   Distance 14ft; 56ft    Stair Management Assistance Not tested  (pt does not have stairs to negotiate at home )   Balance   Static Sitting Normal   Dynamic Sitting Good   Static Standing Good  (with RW )   Dynamic Standing Fair +  (with RW )   Ambulatory Fair +  (with RW )   Endurance Deficit   Endurance Deficit No   Activity Tolerance   Activity Tolerance Patient tolerated treatment well   Nurse Made Aware Spoke with FAUSTO Chirstensen pre/post session    Assessment   Prognosis Good   Assessment Pt seen for PT evaluation, cleared by FAUSTO Christensen, activity orders of "up as tolerated"  PT consult received for mobility assessment and discharge needs  Pt admitted 1/13/2021 sent by PCP for abnormal labs and hematuria, dx Chronic diastolic congestive heart failure ([facility] [address] )  Comorbidities affecting pt's fnxl performance include: LUIS, CHF, hypertension and hypothyroid  Personal factors affecting pt at time of PT IE include: lives in 1 story house, ambulating with assistive device, inability to navigate community distances, limited insight into impairments and inability to perform IADLS   PTA, pt was independent with functional mobility with RW, independent with ADLS, requiring assist for IADLS, living alone in a single story home with ramp/no steps to enter, ambulating household distance and home with family assist  The AM-PAC objective tool in combination with use of the Barthel Index outcome tool were used to assist in determining pt safety with mobility/ADLs and appropriate dispo recommendations, see below for scores  Pt is at risk of falls d/t impaired balance, acuity of medical illness, use of assistive device, advanced age and ongoing medical treatment of primary diagnosis  Pt's clinical presentation is currently evolving seen in pt's presentation of vital sign response and increased fall risk  This PT IE requires high complexity clinical decision making, based on multiple medical/physiological/fnxl/social factors which affect pt's performance w/ evaluation & therapist judgement for disposition recommendations  Based on pt presentation & impairments, pt appears to be at/close to his baseline level of functional mobility  Pt would most appropriately benefit from return to home with continued family supports and home health PT services upon d/c      Goals   Patient Goals "to go home today"    PT Treatment Day 0   Recommendation   PT Discharge Recommendation Home with skilled therapy  (home with continued family supports and HHPT )   Equipment Recommended Walker  (pt owns )   PT - OK to Discharge Yes  (once medically cleared )   AM-PAC Basic Mobility Inpatient   Turning in Bed Without Bedrails 4   Lying on Back to Sitting on Edge of Flat Bed 3   Moving Bed to Chair 3   Standing Up From Chair 3   Walk in Room 3   Climb 3-5 Stairs 2   Basic Mobility Inpatient Raw Score 18   Basic Mobility Standardized Score 41 05   Modified Cotton Scale   Modified Cotton Scale 2   Barthel Index   Feeding 10   Bathing 0   Grooming Score 5   Dressing Score 10   Bladder Score 10   Bowels Score 10   Toilet Use Score 5   Transfers (Bed/Chair) Score 10   Mobility (Level Surface) Score 0   Stairs Score 5   Barthel Index Score 65       Daljit Canales, PT, DPT  1/14/2021

## 2021-01-14 NOTE — OCCUPATIONAL THERAPY NOTE
Occupational Therapy Evaluation       01/14/21 0948   Note Type   Note type Evaluation   Restrictions/Precautions   Weight Bearing Precautions Per Order No   Other Precautions Telemetry; Chair Alarm; Bed Alarm; Fall Risk   Pain Assessment   Pain Assessment Tool 0-10   Pain Score No Pain   Home Living   Type of [address] One level;Ramped entrance   Bathroom Shower/Tub Tub/shower unit   Bathroom Toilet Standard   Bathroom Equipment Grab bars in shower; Tub transfer bench;Grab bars around toilet   Bathroom Accessibility Accessible via walker   [address] [facility]   Prior Function   Level of [address] with ADLs and functional mobility   Lives With Alone   Receives Help From Family   ADL Assistance Independent   IADLs Needs assistance   Vocational Retired   Comments Patient reports PTA independent in all ADLs and mobility with use of RW; patient lives alone but patient's daughter comes to check on patient daily and assists with ADLs/iADLs as needed   ADL   Eating Assistance 7  Independent   Grooming Assistance 7  Independent   UB Bathing Assistance 7  Independent   LB Bathing Assistance 7  Independent   UB Dressing Assistance 7  Independent   LB Dressing Assistance 7  Independent   LB Dressing Deficit Don/doff R sock; Don/doff L sock   Toileting Assistance  7  Independent   Bed Mobility   Supine to Sit 5  Supervision   Sit to Supine 5  Supervision   Transfers   Sit to Stand 5  Supervision   Stand to Sit 5  Supervision   Toilet transfer 5  Supervision   Additional items Standard toilet  (Ambulatory transfer with RW)   Functional Mobility   Functional Mobility 5  Supervision   Additional Comments Patient ambulated short household distance in room to/from bathroom with RW and supervision   Balance   Static Sitting Good   Dynamic Sitting Good   Static Standing Good   Dynamic Standing Good   Ambulatory Fair +   Activity Tolerance   Activity Tolerance Patient tolerated treatment well   RUE Assessment RUE Assessment WFL   LUE Assessment   LUE Assessment WFL   Cognition   Overall Cognitive Status WFL   Arousal/Participation Alert; Cooperative   Attention Within functional limits   Orientation Level Oriented to person;Oriented to place;Oriented to situation;Disoriented to time   Memory Within functional limits   Following Commands Follows multistep commands with increased time or repetition   Assessment   Prognosis Good   Assessment Patient evaluated by Occupational Therapy  Patient admitted with Chronic diastolic congestive heart failure ([facility] [address] )  The patients occupational profile, medical and therapy history includes a expanded review of medical and/or therapy records and additional review of physical, cognitive, or psychosocial history related to current functional performance  Comorbidities affecting functional mobility and ADLS include: HTN, thyroid disease  Prior to admission, patient was independent with functional mobility with RW, independent with ADLS and requiring assist for IADLS  The evaluation identifies the following performance deficits: impaired balance and decreased IADLS, that result in activity limitations and/or participation restrictions  This evaluation requires clinical decision making of moderate complexity, because the patient may present with comorbidities that affect occupational performance and required minimal or moderate modification of tasks or assistance with the consideration of several treatment options  The Barthel Index was used as a functional outcome tool presenting with a score of 65, indicating moderate limitations of functional mobility and ADLS  Patient currently presents at/near baseline for functional status of ADLs and functional mobility  NO further skilled inpatient OT services indicated at this time  OT orders to be discharged  Recommend patient return home with continued use of RW for transfer/mobility and continued support/assist from daughter     Goals Patient Goals to go home   Plan   OT Frequency Eval only   Recommendation   OT Discharge Recommendation Return to previous environment with social support   Barthel Index   Feeding 10   Bathing 0   Grooming Score 5   Dressing Score 10   Bladder Score 10   Bowels Score 10   Toilet Use Score 5   Transfers (Bed/Chair) Score 10   Mobility (Level Surface) Score 0   Stairs Score 5   Barthel Index Score 65     [place] [employer], OTR/L

## 2021-01-14 NOTE — PROGRESS NOTES
Progress Note Curt Yates 7/23/1929, 80 y o  male MRN: [id number]    Unit/Bed#: -01 Encounter: [id number]    Primary Care Provider: Aleah Yadav MD   Date and time admitted to hospital: 1/13/2021  1:49 PM        LUIS (acute kidney injury) ([facility] [address] )  Assessment & Plan  Creatinine 2 9 improved to 2 7 after IV fluid  Baseline 1 1-1 2  H/o taking Bactrim for UTI since last 1 week which may have exacerbated LUIS  Suspicion of interstitial nephritis  Pt here with creatinine of 2 9 probably due to prerenal in origin  Baseline 1 1-1 2  Elevated BUN of 90 on admission  Pt was taking Bactrim since last 1 week which may have exacerbated LUIS  Called patient's cardiologist stating Pt having similar GFR so there is suspicion that Pt has baseline elevated creatinine however will confirm tomorrow morning  Plan  -nephrology consulted recommended urine eosinophil, bladder US, UA  -follow-up with bladder scan   -continue hydration gently 50 cc/hour  -hold Lasix and Entresto  Avoid nephrotoxic drugs  BMP in a m  * Chronic diastolic congestive heart failure [facility])  Assessment & Plan  Wt Readings from Last 3 Encounters:   01/14/21 75 8 kg (167 lb)   01/23/17 97 7 kg (215 lb 6 2 oz)   05/03/16 95 3 kg (210 lb)   Following up with cardiologist Dr Luisana Byers  Pt not sure about last echocardiogram states around 3 years ago  Previous admission for acute onset diastolic heart failure  Looks hypovolemic on presentation  B/L LE pitting edema found on examination  Admitted for LUIS  EKG showing new onset AFib  CXR showing cardiomegaly but no vascular congestion present  Lipid panel WN and   Troponin times on WNL  BNP on admission 700  Had episode of bradycardia with HR in 40s overnight      Will discontinue metoprolol as per Cardiology recommendation  Continue monitoring BP off metoprolol  Cardiology on board, recommendation appreciated  Will start on Eliquis 1 kidney function stabilized  Continue heparin drip as per protocol  Continue to hold Lasix and Entresto  Daily weight  I's and O's  Salt restricted diet  Will call patient's cardiologist tomorrow morning  BPH (benign prostatic hyperplasia)  Assessment & Plan  Continue finasteride  Start taking Flomax  Ambulatory dysfunction  Assessment & Plan  Uses walker at home    Follow-up with PT OT  Supportive measures  Fall precautions  Activity with assistance  New onset a-fib [facility])  Assessment & Plan  EKG showing new onset AFib  Rate controlled  On heparin drip  Telemetry monitoring  Cardiology consult  Hold metoprolol due to bradycardia    Hyponatremia  Assessment & Plan  Resolved  With IV fluid  Hypothyroidism  Assessment & Plan  Continue levothyroxine    Hypertension  Assessment & Plan  Continue metoprolol  Hold home Lasix and Entresto due to LUIS  VTE Pharmacologic Prophylaxis:   Pharmacologic: Heparin Drip  Mechanical VTE Prophylaxis in Place: Yes    Discussions with Specialists or Other Care Team Provider:  Cardiology Nephrology    Education and Discussions with Family / Patient:  Talked with daughter Kristal Carrillo and updated her regarding patient's condition and treatment plan  Daughter was pleasant and nice  Daughter's  was also on line  Both agrees with the plan  Stated Pt may need additional stay for of  decreased kidney function  All questions/concerns were answered  Current Length of Stay: 1 day(s)    Current Patient Status: Inpatient     Discharge Plan / Estimated Discharge Date:  When kidney function stabilizes    Code Status: Level 3 - DNAR and DNI      Subjective:   Patient was seen and examined by me at bedside  Communicated clearly  No particular overnight event reported  Hemodynamically stable and afebrile  Patient feels better overall        Objective:     Vitals:   Temp (24hrs), Av °F (36 7 °C), Min:97 5 °F (36 4 °C), Max:98 7 °F (37 1 °C)    Temp:  [97 5 °F (36 4 °C)-98 7 °F (37 1 °C)] 98 7 °F (37 1 °C)  HR: [51-85] 57  Resp:  [16-20] 16  BP: ()/(42-69) 95/46  SpO2:  [96 %-98 %] 96 %  Body mass index is 27 79 kg/m²  Input and Output Summary (last 24 hours): Intake/Output Summary (Last 24 hours) at 1/14/2021 1628  Last data filed at 1/14/2021 1201  Gross per 24 hour   Intake    Output 1250 ml   Net -1250 ml       Physical Exam:     Physical Exam  Vitals signs reviewed  Constitutional:       General: He is not in acute distress  Appearance: He is well-developed  Cardiovascular:      Rate and Rhythm: Normal rate and regular rhythm  Pulses: Normal pulses  Heart sounds: Normal heart sounds  Pulmonary:      Effort: Pulmonary effort is normal       Breath sounds: Normal breath sounds  Chest:      Chest wall: No tenderness  Abdominal:      General: Bowel sounds are normal  There is no distension  Palpations: Abdomen is soft  Tenderness: There is no abdominal tenderness  Musculoskeletal:      Right lower leg: No edema  Left lower leg: No edema  Skin:     General: Skin is dry  Coloration: Skin is not pale  Comments: Multiple stable seborrheic keratosis noted all over body  Dry skin   Neurological:      General: No focal deficit present  Mental Status: He is alert and oriented to person, place, and time  Mental status is at baseline  Psychiatric:         Mood and Affect: Mood normal          Behavior: Behavior normal            Additional Data:     Labs:    Results from last 7 days   Lab Units 01/13/21  1424   WBC Thousand/uL 9 96   HEMOGLOBIN g/dL 12 1   HEMATOCRIT % 35 7*   PLATELETS Thousands/uL 329   LYMPHO PCT % 27   MONO PCT % 4   EOS PCT % 2     Results from last 7 days   Lab Units 01/14/21  0554   POTASSIUM mmol/L 4 8   CHLORIDE mmol/L 103   CO2 mmol/L 21*   BUN mg/dL 74*   CREATININE mg/dL 2 71*   CALCIUM mg/dL 8 5     Results from last 7 days   Lab Units 01/13/21  1846   INR  1 01       * I Have Reviewed All Lab Data Listed Above    * Additional Pertinent Lab Tests Reviewed: [place] [address] Admission Reviewed    Imaging:  XR chest 2 views   Final Result by Dimple Hernandez MD (01/13 1551)   No acute cardiopulmonary disease  Stable appearance            Workstation performed: [id number]         US retroperitoneal complete    (Results Pending)      Imaging Reports Reviewed Today Include:  CXR  Imaging Personally Reviewed by Myself Includes:  Same as above    Recent Cultures (last 7 days):           Last 24 Hours Medication List:   Current Facility-Administered Medications   Medication Dose Route Frequency Provider Last Rate    acetaminophen  650 mg Oral Q4H PRN Cyndy Vasquez MD      aspirin  81 mg Oral Daily Cyndy Vasquez MD      finasteride  5 mg Oral Daily Cyndy Vasquez MD      heparin (porcine)  3-20 Units/kg/hr (Order-Specific) Intravenous Titrated Cyndy Vasquez MD 12 Units/kg/hr (01/14/21 1211)    heparin (porcine)  2,000 Units Intravenous Q1H PRN Cyndy Vasquez MD      heparin (porcine)  4,000 Units Intravenous Q1H PRN Cyndy Vasquez MD      levothyroxine  50 mcg Oral Daily Cyndy Vasquez MD      ondansetron  4 mg Intravenous Q6H PRN Cyndy Vasquez MD      sodium chloride  50 mL/hr Intravenous Continuous Cyndy Vasquez MD 50 mL/hr (01/14/21 1103)    tamsulosin  0 4 mg Oral Daily With Zulma Muller MD          Today, Patient Was Seen By: Cyndy Vasquez MD    ** Please Note: This note has been constructed using a voice recognition system   **

## 2021-01-15 ENCOUNTER — APPOINTMENT (INPATIENT)
Dept: CT IMAGING | Facility: HOSPITAL | Age: 86
DRG: 683 | End: 2021-01-15
Payer: MEDICARE

## 2021-01-15 PROBLEM — R93.41 ABNORMAL ULTRASOUND OF BLADDER: Status: ACTIVE | Noted: 2021-01-15

## 2021-01-15 PROBLEM — R00.1 BRADYCARDIA: Status: ACTIVE | Noted: 2021-01-15

## 2021-01-15 LAB
ANION GAP SERPL CALCULATED.3IONS-SCNC: 8 MMOL/L (ref 4–13)
ATRIAL RATE: 0 BPM
BASOPHILS # BLD AUTO: 0.03 THOUSANDS/ΜL (ref 0–0.1)
BASOPHILS NFR BLD AUTO: 1 % (ref 0–1)
BUN SERPL-MCNC: 72 MG/DL (ref 6–20)
CALCIUM SERPL-MCNC: 8.4 MG/DL (ref 8.4–10.2)
CHLORIDE SERPL-SCNC: 107 MMOL/L (ref 96–108)
CO2 SERPL-SCNC: 21 MMOL/L (ref 22–33)
CREAT SERPL-MCNC: 2.46 MG/DL (ref 0.5–1.2)
EOSINOPHIL # BLD AUTO: 0.1 THOUSAND/ΜL (ref 0–0.61)
EOSINOPHIL NFR BLD AUTO: 2 % (ref 0–6)
EOSINOPHIL NFR URNS MANUAL: 0 %
ERYTHROCYTE [DISTWIDTH] IN BLOOD BY AUTOMATED COUNT: 14.9 % (ref 11.6–15.1)
GFR SERPL CREATININE-BSD FRML MDRD: 22 ML/MIN/1.73SQ M
GLUCOSE SERPL-MCNC: 87 MG/DL (ref 65–140)
HCT VFR BLD AUTO: 30.7 % (ref 36.5–49.3)
HGB BLD-MCNC: 10.1 G/DL (ref 12–17)
IMM GRANULOCYTES # BLD AUTO: 0.02 THOUSAND/UL (ref 0–0.2)
IMM GRANULOCYTES NFR BLD AUTO: 0 % (ref 0–2)
LYMPHOCYTES # BLD AUTO: 1.45 THOUSANDS/ΜL (ref 0.6–4.47)
LYMPHOCYTES NFR BLD AUTO: 26 % (ref 14–44)
MAGNESIUM SERPL-MCNC: 2.7 MG/DL (ref 1.6–2.6)
MCH RBC QN AUTO: 31.9 PG (ref 26.8–34.3)
MCHC RBC AUTO-ENTMCNC: 32.9 G/DL (ref 31.4–37.4)
MCV RBC AUTO: 97 FL (ref 82–98)
MONOCYTES # BLD AUTO: 0.67 THOUSAND/ΜL (ref 0.17–1.22)
MONOCYTES NFR BLD AUTO: 12 % (ref 4–12)
NEUTROPHILS # BLD AUTO: 3.25 THOUSANDS/ΜL (ref 1.85–7.62)
NEUTS SEG NFR BLD AUTO: 59 % (ref 43–75)
PLATELET # BLD AUTO: 235 THOUSANDS/UL (ref 149–390)
PMV BLD AUTO: 11.2 FL (ref 8.9–12.7)
POTASSIUM SERPL-SCNC: 4.7 MMOL/L (ref 3.5–5)
PR INTERVAL: 278 MS
QRS AXIS: -61 DEGREES
QRSD INTERVAL: 112 MS
QT INTERVAL: 431 MS
QTC INTERVAL: 413 MS
RBC # BLD AUTO: 3.17 MILLION/UL (ref 3.88–5.62)
SODIUM SERPL-SCNC: 136 MMOL/L (ref 133–145)
T WAVE AXIS: 64 DEGREES
VENTRICULAR RATE: 55 BPM
WBC # BLD AUTO: 5.52 THOUSAND/UL (ref 4.31–10.16)

## 2021-01-15 PROCEDURE — 83735 ASSAY OF MAGNESIUM: CPT | Performed by: INTERNAL MEDICINE

## 2021-01-15 PROCEDURE — 87086 URINE CULTURE/COLONY COUNT: CPT | Performed by: INTERNAL MEDICINE

## 2021-01-15 PROCEDURE — 74176 CT ABD & PELVIS W/O CONTRAST: CPT

## 2021-01-15 PROCEDURE — G1004 CDSM NDSC: HCPCS

## 2021-01-15 PROCEDURE — 99232 SBSQ HOSP IP/OBS MODERATE 35: CPT | Performed by: INTERNAL MEDICINE

## 2021-01-15 PROCEDURE — 85025 COMPLETE CBC W/AUTO DIFF WBC: CPT | Performed by: INTERNAL MEDICINE

## 2021-01-15 PROCEDURE — 80048 BASIC METABOLIC PNL TOTAL CA: CPT | Performed by: INTERNAL MEDICINE

## 2021-01-15 PROCEDURE — 99233 SBSQ HOSP IP/OBS HIGH 50: CPT | Performed by: INTERNAL MEDICINE

## 2021-01-15 PROCEDURE — 93010 ELECTROCARDIOGRAM REPORT: CPT | Performed by: INTERNAL MEDICINE

## 2021-01-15 RX ADMIN — APIXABAN 2.5 MG: 2.5 TABLET, FILM COATED ORAL at 13:35

## 2021-01-15 RX ADMIN — TAMSULOSIN HYDROCHLORIDE 0.4 MG: 0.4 CAPSULE ORAL at 17:41

## 2021-01-15 RX ADMIN — LEVOTHYROXINE SODIUM 50 MCG: 50 TABLET ORAL at 08:12

## 2021-01-15 RX ADMIN — APIXABAN 2.5 MG: 2.5 TABLET, FILM COATED ORAL at 17:41

## 2021-01-15 RX ADMIN — FINASTERIDE 5 MG: 5 TABLET, FILM COATED ORAL at 08:12

## 2021-01-15 RX ADMIN — ASPIRIN 81 MG: 81 TABLET, COATED ORAL at 08:12

## 2021-01-15 RX ADMIN — SODIUM CHLORIDE 50 ML/HR: 0.9 INJECTION, SOLUTION INTRAVENOUS at 07:05

## 2021-01-15 NOTE — PROGRESS NOTES
Progress Note Reena Signs Difilippantonio 1929, 80 y o  male MRN: [id number]    Unit/Bed#: -Timmy Encounter: [id number]    Primary Care Provider: Elvira Ríos MD   Date and time admitted to hospital: 2021  1:49 PM        No new Assessment & Plan notes have been filed under this hospital service since the last note was generated  Service: Internal Medicine        VTE Pharmacologic Prophylaxis:   Pharmacologic: Apixaban (Eliquis)  Mechanical VTE Prophylaxis in Place: Yes    Discussions with Specialists or Other Care Team Provider:  Cardiology Nephrology    Education and Discussions with Family / Patient:  Attending doctor talked with daughter Ac Lewis and updated her regarding patient's condition and probable treatment plan  Daughter called earlier as she had some questions which were answered by attending doctor  Daughter agrees with the plan  All questions/concerns were answered  Current Length of Stay: 2 day(s)    Current Patient Status: Inpatient     Discharge Plan / Estimated Discharge Date:  Needs further stay to check kidney function if improving will discharge tomorrow on 2021    Code Status: Level 3 - DNAR and DNI      Subjective:   Pt was seen and examined by me at bedside  Communicated clearly  As per nursing staff and telemetry monitoring Pt had an episode of bradycardia with lowest of 29 overnight however Pt was asymptomatic  Pt was sleeping pleasantly  Objective:     Vitals:   Temp (24hrs), Av 2 °F (36 8 °C), Min:97 9 °F (36 6 °C), Max:98 7 °F (37 1 °C)    Temp:  [97 9 °F (36 6 °C)-98 7 °F (37 1 °C)] 98 4 °F (36 9 °C)  HR:  [57-85] 67  Resp:  [16-20] 18  BP: ()/(46-69) 114/63  SpO2:  [94 %-96 %] 94 %  Body mass index is 27 69 kg/m²  Input and Output Summary (last 24 hours):        Intake/Output Summary (Last 24 hours) at 1/15/2021 0703  Last data filed at 2021 2301  Gross per 24 hour   Intake    Output 850 ml   Net -850 ml       Physical Exam: Physical Exam  Vitals signs reviewed  Constitutional:       General: He is not in acute distress  Appearance: He is well-developed  Cardiovascular:      Rate and Rhythm: Normal rate and regular rhythm  Pulses: Normal pulses  Heart sounds: Normal heart sounds  Pulmonary:      Effort: Pulmonary effort is normal       Breath sounds: Normal breath sounds  Chest:      Chest wall: No tenderness  Abdominal:      General: Bowel sounds are normal  There is no distension  Palpations: Abdomen is soft  Tenderness: There is no abdominal tenderness  Musculoskeletal:      Right lower leg: No edema  Left lower leg: No edema  Skin:     General: Skin is dry  Coloration: Skin is not pale  Comments: Multiple stable seborrheic keratosis noted all over body  Dry skin   Neurological:      General: No focal deficit present  Mental Status: He is alert and oriented to person, place, and time  Mental status is at baseline  Psychiatric:         Mood and Affect: Mood normal          Behavior: Behavior normal            Additional Data:     Labs:    Results from last 7 days   Lab Units 01/13/21  1424   WBC Thousand/uL 9 96   HEMOGLOBIN g/dL 12 1   HEMATOCRIT % 35 7*   PLATELETS Thousands/uL 329   LYMPHO PCT % 27   MONO PCT % 4   EOS PCT % 2     Results from last 7 days   Lab Units 01/14/21  0554   POTASSIUM mmol/L 4 8   CHLORIDE mmol/L 103   CO2 mmol/L 21*   BUN mg/dL 74*   CREATININE mg/dL 2 71*   CALCIUM mg/dL 8 5     Results from last 7 days   Lab Units 01/13/21  1846   INR  1 01       * I Have Reviewed All Lab Data Listed Above  * Additional Pertinent Lab Tests Reviewed: [facility] Admission Reviewed    Imaging:  US kidney and bladder   Final Result by Gianluca Hernandez MD (01/14 0571)      Unremarkable kidneys  Marked prostate hypertrophy indents the floor the bladder  A bladder mass could be obscured  Correlation with PSA is advised    Urology evaluation may be useful to workup any potential bladder lesion  The study was marked in EPIC for significant notification  Workstation performed: [id number]         XR chest 2 views   Final Result by Katharine Harada, MD (01/13 1551)   No acute cardiopulmonary disease  Stable appearance            Workstation performed: [id number]            Imaging Reports Reviewed Today Include:  CXR and bladder US  Imaging Personally Reviewed by Myself Includes:  Same as above    Recent Cultures (last 7 days):           Last 24 Hours Medication List:   Current Facility-Administered Medications   Medication Dose Route Frequency Provider Last Rate    acetaminophen  650 mg Oral Q4H PRN Zach Nava MD      aspirin  81 mg Oral Daily Zach Nava MD      finasteride  5 mg Oral Daily Zach Nava MD      levothyroxine  50 mcg Oral Daily Zach Nava MD      ondansetron  4 mg Intravenous Q6H PRN Zach Nava MD      sodium chloride  50 mL/hr Intravenous Continuous Zach Nava MD 50 mL/hr (01/14/21 1103)    tamsulosin  0 4 mg Oral Daily With Rafael Carson MD          Today, Patient Was Seen By: Zach Nava MD    ** Please Note: This note has been constructed using a voice recognition system   **

## 2021-01-15 NOTE — ASSESSMENT & PLAN NOTE
EKG showing new onset AFib  Rate controlled  Overnight having bradycardia with AFib  Telemetry showing sinus pauses maximum of 2 5 seconds  Had 1 episode of nosebleed after which apparent was stopped  Discharged on Eliquis 2 5 mg b i d    Hold metoprolol, Lasix and aspirin during inpatient and also after discharge until Pt follows up with cardiologist  Follow-up with Dr Lindsey Rutherford on Tuesday 01/19/2021 after discharge

## 2021-01-15 NOTE — ASSESSMENT & PLAN NOTE
Pt having overnight episodes of bradycardia with lowest of 29 HR while sleeping  However Pt is asymptomatic at night  No dizziness  Telemetry showing bradycardia with sinus pauses  On exertion HR increased to around 60  Pt was explained regarding pacemaker placement however Pt refused to transfer to [address] or get a pacemaker w/o consulting his cardiologist   Continue holding metoprolol and Entresto on discharge    Follow up with your cardiology as OP

## 2021-01-15 NOTE — ASSESSMENT & PLAN NOTE
Wt Readings from Last 3 Encounters:   01/15/21 75 5 kg (166 lb 6 4 oz)   01/23/17 97 7 kg (215 lb 6 2 oz)   05/03/16 95 3 kg (210 lb)   Following up with cardiologist Dr Rosa M Franklin  Last known echocardiogram showing EF 35% however reports not available  Rays B/L LE pitting edema found on examination  Admitted for LUIS  EKG showing new onset AFib  CXR showing cardiomegaly but no vascular congestion present  Lipid panel WN and   Troponin times on WNL  BNP on admission 700       See plan for new onset AFib  Daily weight  I's and O's  Salt restricted diet

## 2021-01-15 NOTE — PROGRESS NOTES
General Cardiology Progress Note   Noemi Yates 80 y o  male MRN: [id number]  Unit/Bed#: -01 Encounter: [id number]      Assessment:  Principal Problem:    LUIS (acute kidney injury) ([facility] )  Active Problems:    Hypertension    Hypothyroidism    Hyponatremia    Chronic diastolic congestive heart failure ([facility] )    New onset a-fib (HCC)    Ambulatory dysfunction    BPH (benign prostatic hyperplasia)      Impression:     Atrial fibrillation  o Presumptive new onset this admission  o Tachy-lesia syndrome, beta-blocker held, heart rates overnight 29-42 beats per minute  o On heparin drip while assessing acute kidney injury before initiating oral anticoagulation   Chronic systolic CHF-reported ejection fraction of 35%, but no current records available  o Entresto being held, Lasix being held  o Outpatient cardiologist is Dr Cathi Tony  o Mildly hypotensive this morning  o Weight 166 lb, unknown baseline   Acute kidney injury on CKD  o Creatinine 2 46 this morning, slow improvement, he was 2 92 on admission    Plan:     Considering only on Toprol 25 mg, and held yesterday, held today, heart rates overnight still dipping into the 20s and 30s, but ambulating yesterday without lightheadedness  Clearly Toprol is necessary from a goal-directed medical therapy standpoint and he does meet all indications for pacemaker however he is not interested, does not want to be transferred to [place], would like to follow-up with his cardiologist and discuss further   Continue off Toprol for now, continue off Entresto for now  [facility] of care per the primary team    Subjective:   Patient seen and examined        Ambulated yesterday with physical therapy, denies any lightheadedness or dizziness, significant bradycardia overnight to a heart rate as low as 29 beats per minute on occasion, but tends to average high 30s to 50s, during the day yesterday heart rates were consistently over 50, this morning heart rates around 60    Review of Systems   Constitution: Negative for diaphoresis, fever, malaise/fatigue and night sweats  Cardiovascular: Negative for chest pain, dyspnea on exertion, leg swelling, orthopnea, palpitations and syncope  Respiratory: Negative for cough, shortness of breath, sputum production and wheezing  Skin: Negative for itching and rash  Gastrointestinal: Negative for abdominal pain, change in bowel habit and diarrhea  Neurological: Negative for dizziness, focal weakness, light-headedness and weakness  Objective   Vitals: Blood pressure 90/50, pulse (!) 51, temperature (!) 97 4 °F (36 3 °C), temperature source Tympanic, resp  rate 16, height 5' 5" (1 651 m), weight 75 5 kg (166 lb 6 4 oz), SpO2 96 %  , Body mass index is 27 69 kg/m² , I/O last 3 completed shifts:  In: -   Out: 1450 [Urine:1450]  No intake/output data recorded  Wt Readings from Last 3 Encounters:   01/15/21 75 5 kg (166 lb 6 4 oz)   01/23/17 97 7 kg (215 lb 6 2 oz)   05/03/16 95 3 kg (210 lb)       Intake/Output Summary (Last 24 hours) at 1/15/2021 8157  Last data filed at 1/14/2021 2301  Gross per 24 hour   Intake    Output 850 ml   Net -850 ml     I/O last 3 completed shifts:  In: -   Out: 3814 [Urine:1450]    AFib with slow ventricular response overnight, but yesterday over 60, in today over 60    Physical Exam  Constitutional:       General: He is not in acute distress  Appearance: He is not diaphoretic  HENT:      Head: Normocephalic and atraumatic  Neck:      Musculoskeletal: Normal range of motion and neck supple  Vascular: No JVD  Cardiovascular:      Rate and Rhythm: Bradycardia present  Rhythm irregular  Heart sounds: Normal heart sounds  No murmur  Pulmonary:      Effort: Pulmonary effort is normal  No respiratory distress  Breath sounds: Normal breath sounds  No wheezing or rales  Abdominal:      General: Bowel sounds are normal  There is no distension  Palpations: Abdomen is soft  Tenderness: There is no abdominal tenderness  Musculoskeletal:      Right lower leg: No edema  Left lower leg: No edema  Skin:     General: Skin is warm and dry  Neurological:      Mental Status: He is alert and oriented to person, place, and time           Meds/Allergies   No Known Allergies    Current Facility-Administered Medications:     acetaminophen (TYLENOL) tablet 650 mg, 650 mg, Oral, Q4H PRN, Lul Gilbert MD    aspirin (ECOTRIN LOW STRENGTH) EC tablet 81 mg, 81 mg, Oral, Daily, Lul Gilbert MD, 81 mg at 01/15/21 2435    finasteride (PROSCAR) tablet 5 mg, 5 mg, Oral, Daily, Lul Gilbert MD, 5 mg at 01/15/21 5779    levothyroxine tablet 50 mcg, 50 mcg, Oral, Daily, Lul Gilbert MD, 50 mcg at 01/15/21 0812    ondansetron (ZOFRAN) injection 4 mg, 4 mg, Intravenous, Q6H PRN, Lul Gilbert MD    sodium chloride 0 9 % infusion, 50 mL/hr, Intravenous, Continuous, Lul Gilbert MD, Last Rate: 50 mL/hr at 01/15/21 0705, 50 mL/hr at 01/15/21 0705    tamsulosin (FLOMAX) capsule 0 4 mg, 0 4 mg, Oral, Daily With Leopold Jacob, MD, 0 4 mg at 01/14/21 1603    Laboratory Results:  Results from last 7 days   Lab Units 01/13/21  2205 01/13/21  1840 01/13/21  1424   TROPONIN I ng/mL 0 04 0 03 0 04       CBC with diff:   Results from last 7 days   Lab Units 01/15/21  0523 01/13/21  1424   WBC Thousand/uL 5 52 9 96   HEMOGLOBIN g/dL 10 1* 12 1   HEMATOCRIT % 30 7* 35 7*   MCV fL 97 95   PLATELETS Thousands/uL 235 329   MCH pg 31 9 32 3   MCHC g/dL 32 9 33 9   RDW % 14 9 14 8   MPV fL 11 2 10 9       CMP:  Results from last 7 days   Lab Units 01/15/21  0523 01/14/21  0554 01/13/21  1424   POTASSIUM mmol/L 4 7 4 8 4 8   CHLORIDE mmol/L 107 103 97   CO2 mmol/L 21* 21* 18*   BUN mg/dL 72* 74* 81*   CREATININE mg/dL 2 46* 2 71* 2 92*   CALCIUM mg/dL 8 4 8 5 8 5   EGFR ml/min/1 73sq m 22 20 18       BMP:  Results from last 7 days   Lab Units 01/15/21  0542 01/14/21  0554 01/13/21  1424   POTASSIUM mmol/L 4 7 4 8 4 8   CHLORIDE mmol/L 107 103 97   CO2 mmol/L 21* 21* 18*   BUN mg/dL 72* 74* 81*   CREATININE mg/dL 2 46* 2 71* 2 92*   CALCIUM mg/dL 8 4 8 5 8 5       NT-proBNP: No results for input(s): NTBNP in the last 72 hours  Magnesium:   Results from last 7 days   Lab Units 01/15/21  0523 01/13/21  1424   MAGNESIUM mg/dL 2 7* 2 8*       Coags:   Results from last 7 days   Lab Units 01/14/21  1012 01/14/21  0056 01/13/21  1846   PTT seconds 78* 79* 27   INR   --   --  1 01       TSH:    Results from last 7 days   Lab Units 01/13/21  1840   TSH 3RD GENERATON uIU/mL 3 322       Hemoglobin A1C )  Results from last 7 days   Lab Units 01/13/21  1840   HEMOGLOBIN A1C % 5 6       Lipid Profile:   No results found for: CHOL  No results found for: HDL  No results found for: LDLCALC  No results found for: LDLDIRECT  No results found for: TRIG    Cardiac testing:   EKG personally reviewed by Angel Sofia MD      No results found for this or any previous visit  No results found for this or any previous visit  No results found for this or any previous visit  No results found for this or any previous visit  No results found for this or any previous visit  No results found for this or any previous visit  Angel Sofia MD    Portions of the record may have been created with voice recognition software  Occasional wrong word or "sound a like" substitutions may have occurred due to the inherent limitations of voice recognition software  Read the chart carefully and recognize, using context, where substitutions have occurred

## 2021-01-15 NOTE — CONSULTS
H&P Exam - Urology       Patient: Erin Harman   : 1929 Sex: male   MRN: [id number]     CSN: [id number]      History of Present Illness   HPI:  Erin Harman is a 80 y o  male who was sent in to [facility] by his internist with acute renal failure on chronic renal failure for evaluation  Patient has a long urologic history seeing Dr Purnima Richardson for yearly urologic follow ups and visits transferring to Dr Vaughn Nesbitt practice followed for for for 5 years finally told he no longer needs urologic follow ups at the age of 80 developing gross hematuria 3 weeks ago calling his medical doctor who suggest he go to a [facility] for urine analysis but could not find 1 that would see him calling a doctor on the phone who did a telephone consult and started him on Bactrim for what was thought to be a UTI though he denied symptoms  Patient took the Bactrim for for 4 days undergoing blood work by his internist and told to present to [facility] ER for evaluation of his worsening renal failure he denies gross hematuria at this time he has a fair to strong stream going 6 times a day getting up no more than once or twice a night he does not smoke seen at the bedside he feels fine        Review of Systems:   Constitutional:  Negative for activity change, fever, chills and diaphoresis  HENT: Negative for hearing loss and trouble swallowing  Eyes: Negative for itching and visual disturbance  Respiratory: Negative for chest tightness and shortness of breath  Cardiovascular: Negative for chest pain, edema  Gastrointestinal: Negative for abdominal distention, na abdominal pain, constipation, diarrhea, Nausea and vomiting  Genitourinary: Negative for decreased urine volume, difficulty urinating, dysuria, enuresis, frequency, hematuria and urgency  Musculoskeletal: Negative for gait problem and myalgias  Neurological: Negative for dizziness and headaches  Hematological: Does not bruise/bleed easily  Historical Information   Past Medical History:   Diagnosis Date    Disease of thyroid gland     Hypertension      No past surgical history on file  Social History   Social History     Substance and Sexual Activity   Alcohol Use No     Social History     Substance and Sexual Activity   Drug Use No     Social History     Tobacco Use   Smoking Status Former Smoker     Family History: No family history on file  Meds/Allergies   Medications Prior to Admission   Medication    aspirin (ECOTRIN LOW STRENGTH) 81 mg EC tablet    FINASTERIDE PO    furosemide (LASIX) 20 mg tablet    levothyroxine 50 mcg tablet    metoprolol succinate (TOPROL-XL) 25 mg 24 hr tablet    sacubitril-valsartan (Entresto) 24-26 MG TABS     No Known Allergies    Objective   Vitals: /58 (BP Location: Right arm)   Pulse (!) 53   Temp 98 9 °F (37 2 °C) (Tympanic)   Resp 20   Ht 5' 5" (1 651 m)   Wt 75 5 kg (166 lb 6 4 oz)   SpO2 96%   BMI 27 69 kg/m²     Physical Exam:  General Alert awake   Normocephalic atraumatic PERRLA  Lungs clear bilaterally  Cardiac normal S1 normal S2  Abdomen soft, flank pain none  Bilateral hydroceles normal testicles  Rectal exam not performed  Extremities no edema    I/O last 24 hours:   In: -   Out: 1350 [Urine:1350]    Invasive Devices     Peripheral Intravenous Line            Peripheral IV 01/13/21 Left Forearm 2 days                    Lab Results: CBC:   Lab Results   Component Value Date    WBC 5 52 01/15/2021    HGB 10 1 (L) 01/15/2021    HCT 30 7 (L) 01/15/2021    MCV 97 01/15/2021     01/15/2021    MCH 31 9 01/15/2021    MCHC 32 9 01/15/2021    RDW 14 9 01/15/2021    MPV 11 2 01/15/2021     CMP:   Lab Results   Component Value Date     01/15/2021    CO2 21 (L) 01/15/2021    BUN 72 (H) 01/15/2021    CREATININE 2 46 (H) 01/15/2021    CALCIUM 8 4 01/15/2021    AST 28 01/24/2017    ALT 66 01/24/2017    ALKPHOS 54 01/24/2017    EGFR 22 01/15/2021     Urinalysis:   Lab Results Component Value Date    COLORU Yellow 01/14/2021    CLARITYU Clear 01/14/2021    SPECGRAV 1 015 01/14/2021    PHUR 6 0 01/14/2021    PHUR 5 0 01/23/2017    LEUKOCYTESUR Negative 01/14/2021    NITRITE Negative 01/14/2021    GLUCOSEU Negative 01/14/2021    KETONESU Negative 01/14/2021    BILIRUBINUR Negative 01/14/2021    BLOODU 3+ (A) 01/14/2021     Urine Culture:   Lab Results   Component Value Date    URINECX 3064-3740 cfu/ml Staphylococcus coagulase negative (A) 04/25/2019     PSA: No results found for: PSA        Assessment/ Plan:  Acute renal failure on chronic renal failure most likely from [facility] nephrology on board  Microscopic hematuria on admission urine culture not sent  No urinary complaints  Renal/bladder ultrasound confirming possible large middle lobe prostate possible bladder mass  Plan spiral CT scan abdomen pelvis  Urine culture ordered  Patient can be discharged home and seen in the office for follow-up for flex cysto  If bladder tumor found [facility] TURBT retrogrades      Osvaldo Hercules MD

## 2021-01-15 NOTE — PROGRESS NOTES
[address] [facility] RONNA Marshall Trudy 80 y o  male MRN: [id number]  Unit/Bed#: -Timmy Encounter: [id number]  Reason for Consult: LUIS    ASSESSMENT and PLAN:  80year old gentleman with hypertension, hypothyroidism, congestive heart failure, chronic kidney disease was admitted to [facility] on January 13, 2021 after being referred from his PCPs of these due to abnormal lab work  A renal consultation was requested for assistance with management of acute kidney injury  The patient was being treated with Bactrim for a UTI prior to admission  1  Acute kidney injury (POA):  · Admitted with a creatinine of 2 92 on January 13, 2021  · Hospital workup:  · UA: Blood 3+, protein negative, RBC 20-30, WBC 4-10  · Renal ultrasound showed no hydronephrosis  There was marked prostate hypertrophy  A bladder mass could be obscured  · Etiology felt to be multifactorial: (1) hemodynamic/prerenal from low BP complicated by Entresto and Furosemide use, (2) Bactrim effect  · Creatinine down to 2 46 today  · He seems euvolemic and would stop the IVF  2  Chronic kidney disease, stage III:  · Baseline creatinine appears to be around 1 5-1 8 based on lab work from 2019 to 2020  · No prior Nephrology follow-up as an outpatient  · Will need renal follow up on discharge  3  Hypertension:  · BP is on the low side  · Not on meds at this time  4  Hyponatremia:  · Sodium was 128 on admission  Now up to 136  · Resolved  5  Congestive heart failure:  · Outpatient regimen:  Entresto, furosemide  · Both are currently on hold  · Would continue to hold on discharge  6  Atrial fibrillation  · Was bradycardic overnight  · Beta-blockers being held in light of potential Tachy-Kvng syndrome  7  Microscopic hematuria, abnormal renal ultrasound  · He will need Urology evaluation in light of renal ultrasound findings    · The microscopic hematuria is less likely to be glomerular in origin due to the absence of proteinuria  · From what he tells me, he has had gross hematuria in the past and was seen by Dr Star Golden of [address] [place] [address] urology back in 2016  DISPOSITION:  · Stop IVF  · Stable for discharge from renal standpoint  · Recommend holding Entresto and Furosemide on discharge  · Repeat BMP 3-5 days after discharge  · If renal function back to baseline on repeat BMP, then may restart Entresto and repeat BMP again 2 weeks after restarting Entresto  · Will arrange for renal follow up on discharge  The above plan was discussed with Dr Leroy Lucas  SUBJECTIVE / 24H INTERVAL HISTORY:  He denies any CP or SOB  Appetite is fair  Noted to be bradycardic overnight  Review of previous records was done and the following previous creatinine readings were obtained:  September 2019: creatinine 1 62  March 2020:  Creatinine 1 57  May 2020:  Creatinine 1 80  August 2020:  Creatinine 1 54    OBJECTIVE:  Current Weight: Weight - Scale: 75 5 kg (166 lb 6 4 oz)  Vitals:    01/14/21 1500 01/14/21 2016 01/15/21 0529 01/15/21 0716   BP: (!) 95/46 114/63  90/50   BP Location: Left arm Left arm  Right arm   Pulse: 57 67  (!) 51   Resp: 16 18  16   Temp: 98 7 °F (37 1 °C) 98 4 °F (36 9 °C)  (!) 97 4 °F (36 3 °C)   TempSrc: Tympanic Tympanic  Tympanic   SpO2:  94%  96%   Weight:   75 5 kg (166 lb 6 4 oz)    Height:           Intake/Output Summary (Last 24 hours) at 1/15/2021 1024  Last data filed at 1/14/2021 2301  Gross per 24 hour   Intake    Output 650 ml   Net -650 ml     General: conscious, cooperative, no distress  Skin: dry  Eyes: pink conjunctivae  ENT: moist mucous membranes  Chest/Lungs: equal chest expansion, clear breath sounds  CVS: distinct heart sounds, normal rate, irregular rhythm, no rub  Abdomen: soft, non tender, non distended, normal bowel sounds  Extremities: no edema  : no larkin catheter  Neuro: awake, alert     Psych: appropriate affect    Medications:    Current Facility-Administered Medications:     acetaminophen (TYLENOL) tablet 650 mg, 650 mg, Oral, Q4H PRN, Meghan Lewis MD    apixaban (ELIQUIS) tablet 2 5 mg, 2 5 mg, Oral, BID, Willem Erickson MD    finasteride (PROSCAR) tablet 5 mg, 5 mg, Oral, Daily, Meghan Lewis MD, 5 mg at 01/15/21 7484    levothyroxine tablet 50 mcg, 50 mcg, Oral, Daily, Meghan Lewis MD, 50 mcg at 01/15/21 0812    ondansetron (ZOFRAN) injection 4 mg, 4 mg, Intravenous, Q6H PRN, Meghan Lewis MD    sodium chloride 0 9 % infusion, 50 mL/hr, Intravenous, Continuous, Meghan Lewis MD, Last Rate: 50 mL/hr at 01/15/21 0705, 50 mL/hr at 01/15/21 0705    tamsulosin (FLOMAX) capsule 0 4 mg, 0 4 mg, Oral, Daily With Dave Oleary MD, 0 4 mg at 01/14/21 1603    Laboratory Results:  Results from last 7 days   Lab Units 01/15/21  0523 01/14/21  0554 01/13/21  1424   WBC Thousand/uL 5 52  --  9 96   HEMOGLOBIN g/dL 10 1*  --  12 1   HEMATOCRIT % 30 7*  --  35 7*   PLATELETS Thousands/uL 235  --  329   POTASSIUM mmol/L 4 7 4 8 4 8   CHLORIDE mmol/L 107 103 97   CO2 mmol/L 21* 21* 18*   BUN mg/dL 72* 74* 81*   CREATININE mg/dL 2 46* 2 71* 2 92*   CALCIUM mg/dL 8 4 8 5 8 5   MAGNESIUM mg/dL 2 7*  --  2 8*

## 2021-01-15 NOTE — ASSESSMENT & PLAN NOTE
Mildly distended  Diffuse wall thickening is identified  There is indentation of the bladder with a masslike appearance and may be related to significant prostate hypertrophy  The prostate measures 7 1 x 4 6 x 5 1 cm  No focal thickening or mass lesions      Follow-up with Dr Jasiel De Souza after discharge

## 2021-01-15 NOTE — QUICK NOTE
Patient's heart rate dropped to 29 bpm, however the patient is sleeping and is also asymptomatic  Current rhythm: Afib with pauses - longest - 2 38 seconds  Cardiology on board  No emergent intervention as patient is asymptomatic

## 2021-01-15 NOTE — DISCHARGE INSTR - AVS FIRST PAGE
DISCHARGE INSTRUCTIONS   1  Do Basic metabolic panel (BMP) on 3/21/0099 and follow up with your cadiologist       Also repeat in 2 weeks and follow up with nephrology in 2 weeks    2  Follow up with Dr Carmine Willis in one week  in regards to low heart rate, requirement for pacemaker, lab results  Also follow-up with cardiologist regarding restarting Entresto and Lasix      - Follow up with Urology in 1 week - Dr Stefani Patino regarding work up for prostatic workup blood in urine      - Follow up with the kidney doctors (Nephrology) in 1 week      - Follow up with the gastroenterologist regarding incidental finding of descending colon mass  Information provided in the Follow up section to call the doctors to set up an appointment    3  Take medications regularly     New medication:-  Eliquis 2 5 mg twice daily (blood thinner)  Flomax 5 mg once daily  Tamsulosin 0 4 mg once daily    Hold medication:-  Entresto and Lasix until you see your cardiologist -Dr Carmine Willis    Discontinued medication:-  Metoprolol  Aspirin    4  Come back to the ER if symptoms like dizziness, syncope, active uncontrolled bleeding, recurrent bleeding occurs  5  Activity as tolerated  6  Diet : cardiac healthy   7  You are advised to follow-up with your urologist regarding suspected bladder mass seen on renal ultrasound  8  You are advised to do activity slowly leg standing up from sitting position or doing any exertional work  9  Your heart rate was low so we are advised to follow-up with cardiology regarding it

## 2021-01-15 NOTE — ASSESSMENT & PLAN NOTE
LUIS secondary to Entresto, Lasix and also added effect of Bactrim  Now 2 09  Creatinine 2 9 POA improved to 2 4 after slow IV fluid hydration  Baseline around 1 5-1 8 as confirmed by nephrologist office  Called patient's cardiologist stating Pt having similar GFR so there is suspicion that Pt has baseline elevated creatinine however will confirm tomorrow morning  Plan  Repeat BMP on 1/19/2021    Follow-up with Cardiology  -hold Lasix and Entresto on discharge until he follows up with cardiologist  -Avoid nephrotoxic drugs

## 2021-01-16 VITALS
SYSTOLIC BLOOD PRESSURE: 105 MMHG | HEART RATE: 53 BPM | HEIGHT: 65 IN | TEMPERATURE: 97.7 F | RESPIRATION RATE: 18 BRPM | BODY MASS INDEX: 27.99 KG/M2 | OXYGEN SATURATION: 95 % | DIASTOLIC BLOOD PRESSURE: 59 MMHG | WEIGHT: 168 LBS

## 2021-01-16 LAB
ANION GAP SERPL CALCULATED.3IONS-SCNC: 7 MMOL/L (ref 4–13)
BUN SERPL-MCNC: 69 MG/DL (ref 6–20)
CALCIUM SERPL-MCNC: 8.5 MG/DL (ref 8.4–10.2)
CHLORIDE SERPL-SCNC: 108 MMOL/L (ref 96–108)
CO2 SERPL-SCNC: 21 MMOL/L (ref 22–33)
CREAT SERPL-MCNC: 2.09 MG/DL (ref 0.5–1.2)
GFR SERPL CREATININE-BSD FRML MDRD: 27 ML/MIN/1.73SQ M
GLUCOSE SERPL-MCNC: 80 MG/DL (ref 65–140)
POTASSIUM SERPL-SCNC: 4.8 MMOL/L (ref 3.5–5)
SODIUM SERPL-SCNC: 136 MMOL/L (ref 133–145)

## 2021-01-16 PROCEDURE — 99232 SBSQ HOSP IP/OBS MODERATE 35: CPT | Performed by: INTERNAL MEDICINE

## 2021-01-16 PROCEDURE — 80048 BASIC METABOLIC PNL TOTAL CA: CPT | Performed by: INTERNAL MEDICINE

## 2021-01-16 PROCEDURE — 99239 HOSP IP/OBS DSCHRG MGMT >30: CPT | Performed by: INTERNAL MEDICINE

## 2021-01-16 RX ORDER — TAMSULOSIN HYDROCHLORIDE 0.4 MG/1
0.4 CAPSULE ORAL
Qty: 30 CAPSULE | Refills: 0 | Status: SHIPPED | OUTPATIENT
Start: 2021-01-16

## 2021-01-16 RX ADMIN — FINASTERIDE 5 MG: 5 TABLET, FILM COATED ORAL at 10:43

## 2021-01-16 RX ADMIN — LEVOTHYROXINE SODIUM 50 MCG: 50 TABLET ORAL at 10:43

## 2021-01-16 RX ADMIN — APIXABAN 2.5 MG: 2.5 TABLET, FILM COATED ORAL at 10:43

## 2021-01-16 NOTE — DISCHARGE SUMMARY
Discharge- Cathi Yates 7/23/1929, 80 y o  male MRN: [id number]    Unit/Bed#: -[facility] Encounter: [id number]    Primary Care Provider: Chuyita Becker MD   Date and time admitted to hospital: 1/13/2021  1:49 PM        Bradycardia  Assessment & Plan  Pt having overnight episodes of bradycardia with lowest of 29 HR while sleeping  However Pt is asymptomatic at night  No dizziness  Telemetry showing bradycardia with sinus pauses  On exertion HR increased to around 60  Pt was explained regarding pacemaker placement however Pt refused to transfer to [place] or get a pacemaker w/o consulting his cardiologist   Continue holding metoprolol and Entresto on discharge  Follow up with your cardiology as OP    Abnormal ultrasound of bladder  Assessment & Plan  Mildly distended  Diffuse wall thickening is identified  There is indentation of the bladder with a masslike appearance and may be related to significant prostate hypertrophy  The prostate measures 7 1 x 4 6 x 5 1 cm  No focal thickening or mass lesions  Follow-up with Dr Edwin Espinosa after discharge    BPH (benign prostatic hyperplasia)  Assessment & Plan  Continue finasteride and Flomax    Ambulatory dysfunction  Assessment & Plan  Uses walker at home    Follow-up with PT OT  Supportive measures  Fall precautions  Activity with assistance  New onset a-fib [facility])  Assessment & Plan  EKG showing new onset AFib  Rate controlled  Overnight having bradycardia with AFib  Telemetry showing sinus pauses maximum of 2 5 seconds  Had 1 episode of nosebleed after which apparent was stopped  Discharged on Eliquis 2 5 mg b i d    Hold metoprolol, Lasix and aspirin during inpatient and also after discharge until Pt follows up with cardiologist  Follow-up with Dr Purnima Oconnor on Tuesday 01/19/2021 after discharge    Chronic diastolic congestive heart failure ([address] )  Assessment & Plan  Wt Readings from Last 3 Encounters:   01/15/21 75 5 kg (166 lb 6 4 oz) 01/23/17 97 7 kg (215 lb 6 2 oz)   05/03/16 95 3 kg (210 lb)   Following up with cardiologist Dr Villa Bruce  Last known echocardiogram showing EF 35% however reports not available  Rays B/L LE pitting edema found on examination  Admitted for LUIS  EKG showing new onset AFib  CXR showing cardiomegaly but no vascular congestion present  Lipid panel WN and   Troponin times on WNL  BNP on admission 700  See plan for new onset AFib  Daily weight  I's and O's  Salt restricted diet    Hypothyroidism  Assessment & Plan  Continue levothyroxine    Hypertension  Assessment & Plan  Pressure in low 100s SBP  Will continue to hold Entresto, Lasix and metoprolol on discharge    * LUIS (acute kidney injury) [facility])  Assessment & Plan  LUIS secondary to Entresto, Lasix and also added effect of Bactrim  Now 2 09  Creatinine 2 9 POA improved to 2 4 after slow IV fluid hydration  Baseline around 1 5-1 8 as confirmed by nephrologist office  Called patient's cardiologist stating Pt having similar GFR so there is suspicion that Pt has baseline elevated creatinine however will confirm tomorrow morning  Plan  Repeat BMP on 1/19/2021  Follow-up with Cardiology  -hold Lasix and Entresto on discharge until he follows up with cardiologist  -Avoid nephrotoxic drugs    Hyponatremiaresolved as of 1/16/2021  Assessment & Plan  Resolved  With IV fluid      Discharging Resident Physician: Stephon Torres MD  Attending: No att  providers found  PCP: Dhaval Green MD  Admission Date: 1/13/2021  Discharge Date: 01/16/21    Disposition:     Home with [facility]NA Services (Reminder: Complete face to face encounter)    Reason for Admission:  Hyponatremia, LUIS, atrial fibrillation    Consultations During Hospital Stay:  · Nephrology  · Cardiology  · Urology    Procedures Performed:     · CT abdomen/pelvis  · Ultrasound kidney and bladder  · Chest x-ray PA and lateral  · Twelve lead EKG    Significant Findings / Test Results:     · Mild bladder wall thickening, probably due to chronic outlet obstruction  Unenhanced CT images insensitive in the detection of bladder tumors  If this remains a clinical concern, evaluation with cystoscopy should be considered  · Prosatomegaly    Incidental Findings:   · Suggestion of 2 x 3 cm mass in the distal descending colon as described above  This can be better evaluated with either colonoscopy or repeat CT with enteric contrast  · Small bilateral pleural effusions  Outpatient Tests Requested:  · BMP    Complications:      Hospital Course:     Angeline Reese is a 80 y o  male patient with past medical history of hypertension, hypothyroidism, heart failure who presented to the hospital following referral from his PCP Office for abnormal lab on 1/13/2021   Creatinine on presentation was elevated at 2 9, from baseline of 1 1 - 1 2 and sodium of 128  Prior to presentation, patient had been taking Bactrim for the last 1 week for UTI  Hyponatremia was suspected to be due to volume contraction as it improved with IVF normal saline    Nephrology was consulted  Lasix given  On admission, noted to have new onset AFib which was rate controlled  Continued on metoprolol and heparin gtt  Cardiology was consulted  Monitored on telemetry  Later transitioned sinus  Bradycardia however asymptomatic  Metoprolol put on hold  Later noted to have nose bleeding on heparin drip  Heparin and aspirin discontinued  Advised by Cardiology team on the need for cardiac pacemaker however was not interested  Advised follow-up with his primary cardiologist   Renal/Bladder ultrasound noted a marked prostate hypertrophy, with a suspicious bladder mass  Advised follow-up with Urology as outpatient for possible flex cysto  CT abdomen/pelvis done showed the suggestive 2 x 3 cm mass in the distal descending colon  Advised to follow-up with gastroenterology  Ambulatory referral given    Advised to follow-up with BMP on 01/18/2021 and see his primary cardiologist     At the time of discharge, patient was hemodynamically and clinically stable for discharge  Reports no new complaint  For detailed hospitalization course, kindly review medical record  Thanks        Condition at Discharge: stable     Discharge Day Visit / Exam:     Subjective:  Patient seen and examined by me at bedside  Reports no new complaints today  To be discharged home  Denies epistaxis, hemoptysis, cough or leg swelling  Advised to follow-up with his primary cardiologist   Urology and nephrology ambulatory referral was given  Vitals: Blood Pressure: 105/59 (01/16/21 0740)  Pulse: (!) 53 (01/16/21 0740)  Temperature: 97 7 °F (36 5 °C) (01/16/21 0740)  Temp Source: Tympanic (01/16/21 0740)  Respirations: 18 (01/16/21 0740)  Height: 5' 5" (165 1 cm) (01/13/21 1806)  Weight - Scale: 76 2 kg (168 lb) (01/16/21 0543)  SpO2: 95 % (01/16/21 0740)  Exam:   Physical Exam  Constitutional:       General: He is not in acute distress  Appearance: He is not toxic-appearing  HENT:      Head: Normocephalic  Cardiovascular:      Rate and Rhythm: Normal rate  Rhythm irregular  Pulmonary:      Effort: Pulmonary effort is normal    Abdominal:      General: Bowel sounds are normal  There is no distension  Palpations: Abdomen is soft  Tenderness: There is no abdominal tenderness  Musculoskeletal:      Right lower leg: No edema  Left lower leg: No edema  Neurological:      General: No focal deficit present  Mental Status: He is alert and oriented to person, place, and time  Mental status is at baseline  Psychiatric:         Mood and Affect: Mood normal          Behavior: Behavior normal          Discussion with Family: Attending spoke with daughter    Discharge instructions/Information to patient and family:   See after visit summary for information provided to patient and family        Provisions for Follow-Up Care:  See after visit summary for information related to follow-up care and any pertinent home health orders  Planned Readmission: No     Discharge Medications:  See after visit summary for reconciled discharge medications provided to patient and family        ** Please Note: This note has been constructed using a voice recognition system **

## 2021-01-16 NOTE — INCIDENTAL FINDINGS
The following findings require follow up:  Radiographic finding   Finding: US kidney and bladder: Unremarkable kidneys  , Marked prostate hypertrophy indents the floor the bladder  A bladder mass could be obscured  , Correlation with PSA is advised    Urology evaluation may be useful to workup any potential bladder lesion     Follow up required: Yes   Follow up should be done within 1 week(s)    Please notify the following clinician to assist with the follow up:   Dr MELCHOR

## 2021-01-16 NOTE — NURSING NOTE
AVS reviewed with pt and with daughter  All questions answered, pt and daughter verbalize understanding  IV removed  Monitor removed  Belongings accounted for

## 2021-01-16 NOTE — PROGRESS NOTES
Progress Note - Urology      Patient: Lali Nunes   : 1929 Sex: male   MRN: [id number]     CSN: [id number]  Unit/Bed#: [facility]     SUBJECTIVE:   Feels fine  Renal failure better      Objective   Vitals: /59 (BP Location: Left arm)   Pulse (!) 53   Temp 97 7 °F (36 5 °C) (Tympanic)   Resp 18   Ht 5' 5" (1 651 m)   Wt 76 2 kg (168 lb)   SpO2 95%   BMI 27 96 kg/m²     I/O last 24 hours:   In: -   Out: 1000 [Urine:1000]      Physical Exam:   General Alert awake   Normocephalic atraumatic PERRLA  Lungs clear bilaterally  Cardiac normal S1 normal S2  Abdomen soft, flank pain  Extremities no edema      Lab Results: CBC:   Lab Results   Component Value Date    WBC 5 52 01/15/2021    HGB 10 1 (L) 01/15/2021    HCT 30 7 (L) 01/15/2021    MCV 97 01/15/2021     01/15/2021    MCH 31 9 01/15/2021    MCHC 32 9 01/15/2021    RDW 14 9 01/15/2021    MPV 11 2 01/15/2021     CMP:   Lab Results   Component Value Date     2021    CO2 21 (L) 2021    BUN 69 (H) 2021    CREATININE 2 09 (H) 2021    CALCIUM 8 5 2021    AST 28 2017    ALT 66 2017    ALKPHOS 54 2017    EGFR 27 2021     Urinalysis:   Lab Results   Component Value Date    COLORU Yellow 2021    CLARITYU Clear 2021    SPECGRAV 1 015 2021    PHUR 6 0 2021    PHUR 5 0 2017    LEUKOCYTESUR Negative 2021    NITRITE Negative 2021    GLUCOSEU Negative 2021    KETONESU Negative 2021    BILIRUBINUR Negative 2021    BLOODU 3+ (A) 2021     Urine Culture:   Lab Results   Component Value Date    URINECX 2458-7936 cfu/ml Staphylococcus coagulase negative (A) 2019     PSA: No results found for: PSA      Assessment/ Plan:  Hematuria  Bladder mass awaiting ct scan          Tania Dias MD

## 2021-01-16 NOTE — DISCHARGE INSTRUCTIONS
Sick Sinus Syndrome   WHAT YOU NEED TO KNOW:   Sick sinus syndrome is a heart rhythm disorder  Your heart may beat too slowly or too quickly  It may go back and forth from too slow to too fast  You may have pauses in between beats  Keep track of your heart rate  Follow activity guidelines to help keep your heartbeat as steady as possible  DISCHARGE INSTRUCTIONS:   Call your local emergency number (911 in the [address] [facility]) if:   · You have any of the following signs of a heart attack:      ? Squeezing, pressure, or pain in your chest    ? You may  also have any of the following:     ? Discomfort or pain in your back, neck, jaw, stomach, or arm    ? Shortness of breath    ? Nausea or vomiting    ? Lightheadedness or a sudden cold sweat    · You have any of the following signs of a stroke:      ? Numbness or drooping on one side of your face     ? Weakness in an arm or leg    ? Confusion or difficulty speaking    ? Dizziness, a severe headache, or vision loss    Call your doctor or cardiologist if:   · You have new or worsening symptoms  · You have swelling in your ankles or feet  · You have questions or concerns about your condition or care  Medicines:  Do not start taking any new medicines without talking with your healthcare provider  This includes over-the-counter medicines, such as allergy or cold medicines  You may be given medicines to control sick sinus syndrome, depending on the kind you have  · Heart medicines  may be used to make your heartbeat regular  Some medicines are used to make your heart faster, and others are used to make it slower  If your heartbeat goes back and forth between fast and slow, your provider will adjust medicines carefully  This will help keep your heartbeat in a steady rhythm  · Blood thinners  help prevent blood clots  Clots can cause strokes, heart attacks, and death  The following are general safety guidelines to follow while you are taking a blood thinner:    ?  Watch for bleeding and bruising while you take blood thinners  Watch for bleeding from your gums or nose  Watch for blood in your urine and bowel movements  Use a soft washcloth on your skin, and a soft toothbrush to brush your teeth  This can keep your skin and gums from bleeding  If you shave, use an electric shaver  Do not play contact sports  ? Tell your dentist and other healthcare providers that you take a blood thinner  Wear a bracelet or necklace that says you take this medicine  ? Do not start or stop any other medicines unless your healthcare provider tells you to  Many medicines cannot be used with blood thinners  ? Take your blood thinner exactly as prescribed by your healthcare provider  Do not skip does or take less than prescribed  Tell your provider right away if you forget to take your blood thinner, or if you take too much  ? Warfarin  is a blood thinner that you may need to take  The following are things you should be aware of if you take warfarin:     § Foods and medicines can affect the amount of warfarin in your blood  Do not make major changes to your diet while you take warfarin  Warfarin works best when you eat about the same amount of vitamin K every day  Vitamin K is found in green leafy vegetables and certain other foods  Ask for more information about what to eat when you are taking warfarin  § You will need to see your healthcare provider for follow-up visits when you are on warfarin  You will need regular blood tests  These tests are used to decide how much medicine you need  · Take your medicine as directed  Contact your healthcare provider if you think your medicine is not helping or if you have side effects  Tell him or her if you are allergic to any medicine  Keep a list of the medicines, vitamins, and herbs you take  Include the amounts, and when and why you take them  Bring the list or the pill bottles to follow-up visits   Carry your medicine list with you in case of an emergency  Use a heart monitor as directed: You may need to use a heart monitor at home to provide more information about your condition  This device is also called a Holter monitor, event monitor, or mobile telemetry  Bring your monitor with you to follow-up visits  Manage sick sinus syndrome:   · Check your pulse as directed  Your healthcare provider will show you how to check your pulse  He or she will tell you how often to check it  Keep a record of your pulse rate  Include any details about your pulse  Examples include if it feels regular or like it is skipping beats  Also write down the activity you were doing if your heart rate goes below 60 or above 100 beats per minute  Bring the record with you to your follow-up appointments  · Do not smoke  Nicotine and other chemicals in cigarettes can cause damage to your heart  Ask your healthcare provider for information if you currently smoke and need help to quit  E-cigarettes or smokeless tobacco still contain nicotine  Talk to your healthcare provider before you use these products  · Limit alcohol as directed  Alcohol can worsen heart problems and may lead to heart failure  A drink of alcohol is 12 ounces of beer, 5 ounces of wine, or 1½ ounces of liquor  · Eat heart-healthy foods  These include fruits, vegetables, whole-grain breads, low-fat dairy products, beans, lean meats, and fish  Replace butter and margarine with heart-healthy oils such as olive oil and canola oil  Ask for more information on heart-healthy diets such as the DASH eating plan  · Limit sodium (salt) as directed  Too much sodium can affect your fluid balance  Check labels to find low-sodium or no-salt-added foods  Your healthcare provider will tell you how much sodium is safe for you to have in a day  He or she may recommend that you limit sodium to 2,300 mg a day  · Exercise regularly and maintain a healthy weight    Ask your healthcare provider what a healthy weight is for you  He or she can help you create a weight loss plan if you are overweight  Exercise can help keep your heart healthy, and help you reach or maintain a healthy weight  Your provider may recommend at least 30 minutes of physical activity on most days of the week  Follow up with your doctor or cardiologist as directed:  Write down your questions so you remember to ask them during your visits  © Copyright [address] Information is for End User's use only and may not be sold, redistributed or otherwise used for commercial purposes  All illustrations and images included in CareNotes® are the copyrighted property of [employer]  or [employer]   The above information is an  only  It is not intended as medical advice for individual conditions or treatments  Talk to your doctor, nurse or pharmacist before following any medical regimen to see if it is safe and effective for you  Apixaban (By mouth)   Apixaban (a-PIX-a-ban)  Treats and prevents blood clots  This medicine is a blood thinner  Brand Name(s): Eliquis, Eliquis 30 Day DVT/PE Starter Pack   There may be other brand names for this medicine  When This Medicine Should Not Be Used: This medicine is not right for everyone  Do not use it if you had an allergic reaction to apixaban or you have active bleeding  How to Use This Medicine:   Tablet  · Your doctor will tell you how much medicine to use  Do not use more than directed  · If you are not able to swallow the tablets whole, they may be crushed and mixed in water, 5% dextrose in water (D5W), apple juice, or applesauce  The crushed tablets may be mixed with 60 mL of water or D5W dose and given through a nasogastric tube (NGT)  · This medicine should come with a Medication Guide  Ask your pharmacist for a copy if you do not have one  · Missed dose: Take a dose as soon as you remember   If it is almost time for your next dose, wait until then and take a regular dose  Do not take extra medicine to make up for a missed dose  · Store the medicine in a closed container at room temperature, away from heat, moisture, and direct light  Drugs and Foods to Avoid:   Ask your doctor or pharmacist before using any other medicine, including over-the-counter medicines, vitamins, and herbal products  · Some medicines can affect how apixaban works  Tell your doctor if you are using any of the following:   ? Carbamazepine, itraconazole, ketoconazole, phenytoin, rifampin, ritonavir, Paula's wort  ? Blood thinner (including clopidogrel, heparin, prasugrel, warfarin)  ? Medicine to treat depression  ? NSAID pain or arthritis medicine (including aspirin, celecoxib, diclofenac, ibuprofen, naproxen)  Warnings While Using This Medicine:   · Tell your doctor if you are pregnant or breastfeeding, or if you have kidney disease, liver disease, bleeding problems, antiphospholipid syndrome, or an artificial heart valve  · Do not stop using this medicine suddenly without asking your doctor  You might have a higher risk of stroke for a short time after you stop using this medicine  · This medicine increases your risk for bleeding that can become serious if not controlled  You may also bruise easily, and it may take longer than usual for bleeding to stop  · This medicine may increase your risk for a hematoma (blood clot) in your spine or back if you undergo an epidural or spinal puncture  This could lead to paralysis  Tell your doctor if you ever had spine problems or back surgery  · Tell any doctor or dentist who treats you that you are using this medicine  With your doctor's supervision, you may need to stop using this medicine several days before you have surgery or medical tests  · Your doctor will do lab tests at regular visits to check on the effects of this medicine  Keep all appointments  · Keep all medicine out of the reach of children   Never share your medicine with anyone  Possible Side Effects While Using This Medicine:   Call your doctor right away if you notice any of these side effects:  · Allergic reaction: Itching or hives, swelling in your face or hands, swelling or tingling in your mouth or throat, chest tightness, trouble breathing  · Change in how much or how often you urinate, red or pink urine  · Chest pain, trouble breathing  · Coughing up blood, vomiting blood or material that looks like coffee grounds  · Numbness, tingling, or muscle weakness in your legs or feet  · Red or black, tarry stools  · Unusual bleeding, bruising, or weakness  If you notice other side effects that you think are caused by this medicine, tell your doctor  Call your doctor for medical advice about side effects  You may report side effects to FDA at 8-758-FDA-6631  © Copyright [address] [place] [address] Drive Information is for End User's use only and may not be sold, redistributed or otherwise used for commercial purposes  The above information is an  only  It is not intended as medical advice for individual conditions or treatments  Talk to your doctor, nurse or pharmacist before following any medical regimen to see if it is safe and effective for you  Tamsulosin (By mouth)   Tamsulosin Hydrochloride (bco-IMU-oxc-sin alin-droe-KLOR-stephanie)  Treats benign prostatic hyperplasia (enlarged prostate)  Brand Name(s): Flomax   There may be other brand names for this medicine  When This Medicine Should Not Be Used: This medicine is not right for everyone  Do not use it if you had an allergic reaction to tamsulosin  How to Use This Medicine:   Capsule  · Take your medicine as directed  Your dose may need to be changed several times to find what works best for you  · Take this medicine 30 minutes after the same meal each day  Swallow the capsule whole  Do not crush, chew, or open it  · Read and follow the patient instructions that come with this medicine   Talk to your doctor or pharmacist if you have any questions  · Missed dose: Take a dose as soon as you remember  If it is almost time for your next dose, wait until then and take a regular dose  Do not take extra medicine to make up for a missed dose  If you forget to take this medicine for several days in a row, talk to your doctor before you start taking it again  · Store the medicine in a closed container at room temperature, away from heat, moisture, and direct light  Drugs and Foods to Avoid:   Ask your doctor or pharmacist before using any other medicine, including over-the-counter medicines, vitamins, and herbal products  · Some medicines can affect how tamsulosin works  Tell your doctor if you are using another alpha blocker medicine, cimetidine, erythromycin, ketoconazole, paroxetine, terbinafine, warfarin, or medicine for erectile dysfunction  Warnings While Using This Medicine:   · Tell your doctor if you have kidney disease, liver disease, low blood pressure, prostate cancer, or an allergy to sulfa drugs  · Tell your doctor if you plan to have cataract or glaucoma surgery  This medicine may cause eye problems during surgery  · This medicine may make you dizzy or lightheaded  Do not drive or do anything else that could be dangerous until you know how this medicine affects you  Stand or sit up slowly if you feel dizzy  · Your doctor will check your progress and the effects of this medicine at regular visits  Keep all appointments  · Keep all medicine out of the reach of children  Never share your medicine with anyone    Possible Side Effects While Using This Medicine:   Call your doctor right away if you notice any of these side effects:  · Allergic reaction: Itching or hives, swelling in your face or hands, swelling or tingling in your mouth or throat, chest tightness, trouble breathing  · Blistering, peeling, red skin rash  · Lightheadedness, dizziness, fainting  · Painful, prolonged erection of your penis  If you notice these less serious side effects, talk with your doctor:   · Headache  · Problems with ejaculation  · Runny or stuffy nose  If you notice other side effects that you think are caused by this medicine, tell your doctor  Call your doctor for medical advice about side effects  You may report side effects to FDA at 8-024-FDA-3840  © Copyright [address] [place] [address] Drive Information is for End User's use only and may not be sold, redistributed or otherwise used for commercial purposes  The above information is an  only  It is not intended as medical advice for individual conditions or treatments  Talk to your doctor, nurse or pharmacist before following any medical regimen to see if it is safe and effective for you

## 2021-01-16 NOTE — PROGRESS NOTES
[address] [facility] NOTE   Evangelist Yates 80 y o  male MRN: [id number]  Unit/Bed#: -Timmy Encounter: [id number]  Reason for Consult: LUIS    ASSESSMENT and PLAN:    79 yo male with PMHx of CKD, HTN, hypothyroid, CHF, who presents on 1/13 with abnormal labs  Nephrology consulted for LUIS  Pt was on bactrim prior to admission  1) LUIS on CKD III    - baseline creat 1 5-1 8 mg/dL  - no prior nephrologist  - admission creat 2 92 mg/dL on 1/13  - UA with 3+ blood, neg prot, 20-30 RBC, 4-10 WBC  - u/s without hydro but prostate enlargement and bladder mass could be obscured  - etiology - volume mediated, autoregulatory failure, on entresto and lasix, and bactrim  - 1/16 - creat improving to 2 1 mg/dL    Plan:  - stable from renal standpoint - please call back if questions arise this stay  Pt will next be seen Monday  Thank you  - hold entresto and furosemide on d/c  - BMP 3-5 days after d/c  - once renal function improves, can restart entresto vs furosemide and BMP 1-2 weeks after this  - message sent to renal office for f/u   - await final Urology recs - CT scan read pending    2) HTN    - monitor  Marginal  Not on medication    3) hyponatremia - improved    4) CHF - hold entresto and furosemide on d/c until f/u with PCP, Renal, Cardio teams    5) a fib    - per Primary team  - beta blocker held due to bradycardia    6) hematuria    - Urology on board  Work up in progress    SUBJECTIVE / 24H INTERVAL HISTORY:  Pt denies complaints  No SOB       OBJECTIVE:  Current Weight: Weight - Scale: 76 2 kg (168 lb)  Vitals:    01/15/21 2324 01/16/21 0301 01/16/21 0543 01/16/21 0740   BP: 120/68 107/66  105/59   BP Location: Left arm Left arm  Left arm   Pulse: 56 (!) 51  (!) 53   Resp: 18 20  18   Temp: 98 1 °F (36 7 °C) 97 8 °F (36 6 °C)  97 7 °F (36 5 °C)   TempSrc: Tympanic Tympanic  Tympanic   SpO2: 98% 97%  95%   Weight:   76 2 kg (168 lb)    Height:           Intake/Output Summary (Last 24 hours) at 1/16/2021 0940  Last data filed at 1/16/2021 0301  Gross per 24 hour   Intake    Output 1000 ml   Net -1000 ml     General: NAD  Skin: no rash  Eyes: anicteric sclera  ENT: moist mucous membrane  Neck: supple  Chest: CTA b/l, no ronchii, no wheeze, no rubs, no rales  CVS: s1s2, no murmur, no gallop, no rub  Abdomen: soft, nontender, nl sounds  Extremities: no edema LE b/l  : no larkin  Neuro: AAOX3  Psych: normal affect    Medications:    Current Facility-Administered Medications:     acetaminophen (TYLENOL) tablet 650 mg, 650 mg, Oral, Q4H PRN, Mojgan Garnett MD    apixaban (ELIQUIS) tablet 2 5 mg, 2 5 mg, Oral, BID, Mojgan Garnett MD, 2 5 mg at 01/15/21 1741    finasteride (PROSCAR) tablet 5 mg, 5 mg, Oral, Daily, Mojgan Garnett MD, 5 mg at 01/15/21 0456    levothyroxine tablet 50 mcg, 50 mcg, Oral, Daily, Mojgan Garnett MD, 50 mcg at 01/15/21 6131    ondansetron (ZOFRAN) injection 4 mg, 4 mg, Intravenous, Q6H PRN, Mojgan Garnett MD    tamsulosin (FLOMAX) capsule 0 4 mg, 0 4 mg, Oral, Daily With Shelley England MD, 0 4 mg at 01/15/21 1741    Laboratory Results:  Results from last 7 days   Lab Units 01/16/21  0443 01/15/21  0523 01/14/21  0554 01/13/21  1424   WBC Thousand/uL  --  5 52  --  9 96   HEMOGLOBIN g/dL  --  10 1*  --  12 1   HEMATOCRIT %  --  30 7*  --  35 7*   PLATELETS Thousands/uL  --  235  --  329   POTASSIUM mmol/L 4 8 4 7 4 8 4 8   CHLORIDE mmol/L 108 107 103 97   CO2 mmol/L 21* 21* 21* 18*   BUN mg/dL 69* 72* 74* 81*   CREATININE mg/dL 2 09* 2 46* 2 71* 2 92*   CALCIUM mg/dL 8 5 8 4 8 5 8 5   MAGNESIUM mg/dL  --  2 7*  --  2 8*

## 2021-01-18 ENCOUNTER — TELEPHONE (OUTPATIENT)
Dept: NEPHROLOGY | Facility: CLINIC | Age: 86
End: 2021-01-18

## 2021-01-18 LAB — BACTERIA UR CULT: NORMAL

## 2021-01-18 NOTE — TELEPHONE ENCOUNTER
----- Message from Britany Zhang MD sent at 1/16/2021  9:45 AM EST -----  Hello    Can pt have appt 1-2 week after d/c with BMP 3-5 days after d/c       Can be with Dr Thierry Chin, myself, or AP    Thank you    np

## 2021-01-18 NOTE — TELEPHONE ENCOUNTER
Patient was called to schedule follow up pre provider request  Can be with provider, Dr Trellis Eisenmenger, or an AP  Patient stated they "have a busy schedule and need to talk to my daughter before I schedule anything " Patient will call office when ready to schedule

## 2021-01-20 DIAGNOSIS — Z23 ENCOUNTER FOR IMMUNIZATION: ICD-10-CM

## 2021-01-29 ENCOUNTER — IMMUNIZATIONS (OUTPATIENT)
Dept: FAMILY MEDICINE CLINIC | Facility: HOSPITAL | Age: 86
End: 2021-01-29

## 2021-01-29 DIAGNOSIS — Z23 ENCOUNTER FOR IMMUNIZATION: Primary | ICD-10-CM

## 2021-01-29 PROCEDURE — 91301 SARS-COV-2 / COVID-19 MRNA VACCINE (MODERNA) 100 MCG: CPT

## 2021-01-29 PROCEDURE — 0011A SARS-COV-2 / COVID-19 MRNA VACCINE (MODERNA) 100 MCG: CPT

## 2021-02-25 ENCOUNTER — IMMUNIZATIONS (OUTPATIENT)
Dept: FAMILY MEDICINE CLINIC | Facility: HOSPITAL | Age: 86
End: 2021-02-25

## 2021-02-25 DIAGNOSIS — Z23 ENCOUNTER FOR IMMUNIZATION: Primary | ICD-10-CM

## 2021-02-25 PROCEDURE — 91301 SARS-COV-2 / COVID-19 MRNA VACCINE (MODERNA) 100 MCG: CPT

## 2021-02-25 PROCEDURE — 0012A SARS-COV-2 / COVID-19 MRNA VACCINE (MODERNA) 100 MCG: CPT

## 2021-03-22 PROCEDURE — 88305 TISSUE EXAM BY PATHOLOGIST: CPT | Performed by: PATHOLOGY

## 2021-03-22 PROCEDURE — 88341 IMHCHEM/IMCYTCHM EA ADD ANTB: CPT | Performed by: PATHOLOGY

## 2021-03-22 PROCEDURE — 88342 IMHCHEM/IMCYTCHM 1ST ANTB: CPT | Performed by: PATHOLOGY

## 2021-03-23 ENCOUNTER — LAB REQUISITION (OUTPATIENT)
Dept: LAB | Facility: HOSPITAL | Age: 86
End: 2021-03-23
Payer: MEDICARE

## 2021-03-23 DIAGNOSIS — D48.5 NEOPLASM OF UNCERTAIN BEHAVIOR OF SKIN: ICD-10-CM

## 2021-08-14 ENCOUNTER — APPOINTMENT (EMERGENCY)
Dept: CT IMAGING | Facility: HOSPITAL | Age: 86
DRG: 813 | End: 2021-08-14
Payer: MEDICARE

## 2021-08-14 ENCOUNTER — HOSPITAL ENCOUNTER (INPATIENT)
Facility: HOSPITAL | Age: 86
LOS: 4 days | Discharge: HOME | DRG: 813 | End: 2021-08-18
Attending: EMERGENCY MEDICINE | Admitting: HOSPITALIST
Payer: MEDICARE

## 2021-08-14 DIAGNOSIS — R26.2 AMBULATORY DYSFUNCTION: ICD-10-CM

## 2021-08-14 DIAGNOSIS — R31.0 GROSS HEMATURIA: Primary | ICD-10-CM

## 2021-08-14 DIAGNOSIS — N40.0 BENIGN PROSTATIC HYPERPLASIA, UNSPECIFIED WHETHER LOWER URINARY TRACT SYMPTOMS PRESENT: ICD-10-CM

## 2021-08-14 DIAGNOSIS — N28.9 RENAL INSUFFICIENCY: ICD-10-CM

## 2021-08-14 DIAGNOSIS — I10 ESSENTIAL HYPERTENSION: ICD-10-CM

## 2021-08-14 DIAGNOSIS — R09.89 SUSPECTED CONGESTIVE HEART FAILURE: ICD-10-CM

## 2021-08-14 DIAGNOSIS — I48.91 NEW ONSET A-FIB (HCC): ICD-10-CM

## 2021-08-14 DIAGNOSIS — I50.32 CHRONIC DIASTOLIC CONGESTIVE HEART FAILURE (HCC): ICD-10-CM

## 2021-08-14 DIAGNOSIS — R93.41 ABNORMAL ULTRASOUND OF BLADDER: ICD-10-CM

## 2021-08-14 DIAGNOSIS — R00.1 BRADYCARDIA: ICD-10-CM

## 2021-08-14 LAB
ALBUMIN SERPL BCP-MCNC: 3.4 G/DL (ref 3.5–5)
ALP SERPL-CCNC: 104 U/L (ref 46–116)
ALT SERPL W P-5'-P-CCNC: 15 U/L (ref 12–78)
ANION GAP SERPL CALCULATED.3IONS-SCNC: 10 MMOL/L (ref 4–13)
APTT PPP: 33 SECONDS (ref 23–37)
AST SERPL W P-5'-P-CCNC: 23 U/L (ref 5–45)
BACTERIA UR QL AUTO: ABNORMAL /HPF
BASOPHILS # BLD AUTO: 0.06 THOUSANDS/ΜL (ref 0–0.1)
BASOPHILS NFR BLD AUTO: 1 % (ref 0–1)
BILIRUB SERPL-MCNC: 0.67 MG/DL (ref 0.2–1)
BILIRUB UR QL STRIP: ABNORMAL
BUN SERPL-MCNC: 29 MG/DL (ref 5–25)
CALCIUM ALBUM COR SERPL-MCNC: 9.2 MG/DL (ref 8.3–10.1)
CALCIUM SERPL-MCNC: 8.7 MG/DL (ref 8.3–10.1)
CHLORIDE SERPL-SCNC: 99 MMOL/L (ref 100–108)
CLARITY UR: ABNORMAL
CO2 SERPL-SCNC: 26 MMOL/L (ref 21–32)
COLOR UR: ABNORMAL
CREAT SERPL-MCNC: 1.54 MG/DL (ref 0.6–1.3)
EOSINOPHIL # BLD AUTO: 0.01 THOUSAND/ΜL (ref 0–0.61)
EOSINOPHIL NFR BLD AUTO: 0 % (ref 0–6)
ERYTHROCYTE [DISTWIDTH] IN BLOOD BY AUTOMATED COUNT: 14.8 % (ref 11.6–15.1)
GFR SERPL CREATININE-BSD FRML MDRD: 39 ML/MIN/1.73SQ M
GLUCOSE SERPL-MCNC: 135 MG/DL (ref 65–140)
GLUCOSE UR STRIP-MCNC: NEGATIVE MG/DL
HCT VFR BLD AUTO: 36.8 % (ref 36.5–49.3)
HGB BLD-MCNC: 12 G/DL (ref 12–17)
HGB UR QL STRIP.AUTO: ABNORMAL
IMM GRANULOCYTES # BLD AUTO: 0.08 THOUSAND/UL (ref 0–0.2)
IMM GRANULOCYTES NFR BLD AUTO: 1 % (ref 0–2)
INR PPP: 1.31 (ref 0.84–1.19)
KETONES UR STRIP-MCNC: NEGATIVE MG/DL
LEUKOCYTE ESTERASE UR QL STRIP: ABNORMAL
LYMPHOCYTES # BLD AUTO: 1.17 THOUSANDS/ΜL (ref 0.6–4.47)
LYMPHOCYTES NFR BLD AUTO: 11 % (ref 14–44)
MCH RBC QN AUTO: 32 PG (ref 26.8–34.3)
MCHC RBC AUTO-ENTMCNC: 32.6 G/DL (ref 31.4–37.4)
MCV RBC AUTO: 98 FL (ref 82–98)
MONOCYTES # BLD AUTO: 0.51 THOUSAND/ΜL (ref 0.17–1.22)
MONOCYTES NFR BLD AUTO: 5 % (ref 4–12)
NEUTROPHILS # BLD AUTO: 8.41 THOUSANDS/ΜL (ref 1.85–7.62)
NEUTS SEG NFR BLD AUTO: 82 % (ref 43–75)
NITRITE UR QL STRIP: POSITIVE
NON-SQ EPI CELLS URNS QL MICRO: ABNORMAL /HPF
NRBC BLD AUTO-RTO: 0 /100 WBCS
PH UR STRIP.AUTO: 7 [PH]
PLATELET # BLD AUTO: 298 THOUSANDS/UL (ref 149–390)
PMV BLD AUTO: 9.8 FL (ref 8.9–12.7)
POTASSIUM SERPL-SCNC: 4.1 MMOL/L (ref 3.5–5.3)
PROT SERPL-MCNC: 7.2 G/DL (ref 6.4–8.2)
PROT UR STRIP-MCNC: >=300 MG/DL
PROTHROMBIN TIME: 16.4 SECONDS (ref 11.6–14.5)
RBC # BLD AUTO: 3.75 MILLION/UL (ref 3.88–5.62)
RBC #/AREA URNS AUTO: ABNORMAL /HPF
SODIUM SERPL-SCNC: 135 MMOL/L (ref 136–145)
SP GR UR STRIP.AUTO: 1.02 (ref 1–1.03)
UROBILINOGEN UR QL STRIP.AUTO: 0.2 E.U./DL
WBC # BLD AUTO: 10.24 THOUSAND/UL (ref 4.31–10.16)
WBC #/AREA URNS AUTO: ABNORMAL /HPF

## 2021-08-14 PROCEDURE — 36415 COLL VENOUS BLD VENIPUNCTURE: CPT | Performed by: EMERGENCY MEDICINE

## 2021-08-14 PROCEDURE — 85025 COMPLETE CBC W/AUTO DIFF WBC: CPT | Performed by: EMERGENCY MEDICINE

## 2021-08-14 PROCEDURE — 85730 THROMBOPLASTIN TIME PARTIAL: CPT | Performed by: EMERGENCY MEDICINE

## 2021-08-14 PROCEDURE — 99284 EMERGENCY DEPT VISIT MOD MDM: CPT

## 2021-08-14 PROCEDURE — 99285 EMERGENCY DEPT VISIT HI MDM: CPT | Performed by: EMERGENCY MEDICINE

## 2021-08-14 PROCEDURE — 81001 URINALYSIS AUTO W/SCOPE: CPT | Performed by: EMERGENCY MEDICINE

## 2021-08-14 PROCEDURE — 85610 PROTHROMBIN TIME: CPT | Performed by: EMERGENCY MEDICINE

## 2021-08-14 PROCEDURE — 96360 HYDRATION IV INFUSION INIT: CPT

## 2021-08-14 PROCEDURE — 80053 COMPREHEN METABOLIC PANEL: CPT | Performed by: EMERGENCY MEDICINE

## 2021-08-14 PROCEDURE — 99223 1ST HOSP IP/OBS HIGH 75: CPT | Performed by: INTERNAL MEDICINE

## 2021-08-14 PROCEDURE — 87086 URINE CULTURE/COLONY COUNT: CPT | Performed by: PHYSICIAN ASSISTANT

## 2021-08-14 PROCEDURE — 74176 CT ABD & PELVIS W/O CONTRAST: CPT

## 2021-08-14 RX ORDER — FINASTERIDE 5 MG/1
5 TABLET, FILM COATED ORAL DAILY
Status: DISCONTINUED | OUTPATIENT
Start: 2021-08-15 | End: 2021-08-18 | Stop reason: HOSPADM

## 2021-08-14 RX ORDER — TAMSULOSIN HYDROCHLORIDE 0.4 MG/1
0.4 CAPSULE ORAL
Status: DISCONTINUED | OUTPATIENT
Start: 2021-08-14 | End: 2021-08-18 | Stop reason: HOSPADM

## 2021-08-14 RX ORDER — ONDANSETRON 2 MG/ML
4 INJECTION INTRAMUSCULAR; INTRAVENOUS EVERY 6 HOURS PRN
Status: DISCONTINUED | OUTPATIENT
Start: 2021-08-14 | End: 2021-08-18 | Stop reason: HOSPADM

## 2021-08-14 RX ORDER — LEVOTHYROXINE SODIUM 0.05 MG/1
50 TABLET ORAL
Status: DISCONTINUED | OUTPATIENT
Start: 2021-08-15 | End: 2021-08-18 | Stop reason: HOSPADM

## 2021-08-14 RX ORDER — ACETAMINOPHEN 325 MG/1
650 TABLET ORAL EVERY 6 HOURS PRN
Status: DISCONTINUED | OUTPATIENT
Start: 2021-08-14 | End: 2021-08-18 | Stop reason: HOSPADM

## 2021-08-14 RX ORDER — FUROSEMIDE 40 MG/1
TABLET ORAL
Status: ON HOLD | COMMUNITY
End: 2021-08-18 | Stop reason: SDUPTHER

## 2021-08-14 RX ORDER — LISINOPRIL 20 MG/1
2.5 TABLET ORAL DAILY
COMMUNITY
End: 2021-08-18 | Stop reason: HOSPADM

## 2021-08-14 RX ADMIN — TAMSULOSIN HYDROCHLORIDE 0.4 MG: 0.4 CAPSULE ORAL at 18:01

## 2021-08-14 RX ADMIN — SODIUM CHLORIDE 500 ML: 0.9 INJECTION, SOLUTION INTRAVENOUS at 12:40

## 2021-08-14 NOTE — ASSESSMENT & PLAN NOTE
Lab Results   Component Value Date    EGFR 39 08/14/2021    EGFR 27 01/16/2021    EGFR 22 01/15/2021    CREATININE 1 54 (H) 08/14/2021    CREATININE 2 09 (H) 01/16/2021    CREATININE 2 46 (H) 01/15/2021   · Creatinine appears at baseline/slightly above baseline   · Hold Lasix, Lisinopril for today   · Monitor BMP  · Avoid hypotension, nephrotoxins  · Consider restarting diuretic and ACEI tomorrow

## 2021-08-14 NOTE — H&P
[facility]  AMANDO&Ruth Confer Keeshasandyprabhakarjuana 7/23/1929, 80 y o  male MRN: [id number]  Unit/Bed#: ED 26 Encounter: [id number]  Primary Care Provider: Sanchez Hernandez MD   Date and time admitted to hospital: 8/14/2021 11:54 AM    * Gross hematuria  Assessment & Plan  · POA, unclear etiology, however patient is on Eliquis  No signs of infection  Currently on CBI  · Admit patient to med/surg under inpatient status   · Continue CBI  · Hold Eliquis   · Urology consultation      Atrial fibrillation [facility])  Assessment & Plan  · Currently in A  Fib   · Not on rate control   · Hold Eliquis     Stage 3 chronic kidney disease [facility])  Assessment & Plan  Lab Results   Component Value Date    EGFR 39 08/14/2021    EGFR 27 01/16/2021    EGFR 22 01/15/2021    CREATININE 1 54 (H) 08/14/2021    CREATININE 2 09 (H) 01/16/2021    CREATININE 2 46 (H) 01/15/2021   · Creatinine appears at baseline/slightly above baseline   · Hold Lasix, Lisinopril for today   · Monitor BMP  · Avoid hypotension, nephrotoxins  · Consider restarting diuretic and ACEI tomorrow     Chronic diastolic congestive heart failure [facility])  Assessment & Plan  Wt Readings from Last 3 Encounters:   08/14/21 74 4 kg (164 lb)   01/16/21 76 2 kg (168 lb)   01/23/17 97 7 kg (215 lb 6 2 oz)     · Patient appears at baseline  Has some leg swelling, but this is normal per family  No respiratory complaints   · Hold Lasix for today         Essential hypertension  Assessment & Plan  · BP elevated in ED likely due to hematuria   · Hold Lisinopril for today   · Monitor       VTE Pharmacologic Prophylaxis: VTE Score: 4 Moderate Risk (Score 3-4) - Pharmacological DVT Prophylaxis Contraindicated  Sequential Compression Devices Ordered  Code Status: DNR/DNI  Discussion with family: Updated  (son) at bedside      Anticipated Length of Stay: Patient will be admitted on an inpatient basis with an anticipated length of stay of greater than 2 midnights secondary to As per above assessment and plan   Total Time for Visit, including Counseling / Coordination of Care: 70 minutes Greater than 50% of this total time spent on direct patient counseling and coordination of care  Chief Complaint: Hematuria    History of Present Illness:  Jimmy Adamson is a 80 y o  male with a PMH of A  Fib, CHF, CKD who presents with hematuria  He reports that last evening he got up to use the bathroom and went in the bucket next to his bed as he usually does  He reports that when he emptied it this morning he noted that it was bloody  He denied any pain at the time or difficulty urinating and continues to deny pain  He reports some hematuria in the past related to infection  He denies fevers or chills  He reports that he is on Eliquis and took his morning medications today  Denies chest pain or difficulty breathing  Family at bedside report that his legs are slightly swollen at baseline  Review of Systems:  Review of Systems   Constitutional: Negative for appetite change, chills, diaphoresis, fatigue and fever  HENT: Negative for congestion, rhinorrhea and sore throat  Eyes: Negative for visual disturbance  Respiratory: Negative for cough, chest tightness, shortness of breath and wheezing  Cardiovascular: Positive for leg swelling (chronic)  Negative for chest pain and palpitations  Gastrointestinal: Negative for abdominal pain, constipation, diarrhea, nausea and vomiting  Genitourinary: Positive for hematuria  Negative for decreased urine volume, difficulty urinating and dysuria  Musculoskeletal: Negative for arthralgias and myalgias  Neurological: Negative for dizziness, syncope, weakness, light-headedness, numbness and headaches  All other systems reviewed and are negative        Past Medical and Surgical History:   Past Medical History:   Diagnosis Date    CHF (congestive heart failure) ([facility] [address] )     Disease of thyroid gland     Hypertension        History reviewed  No pertinent surgical history  Meds/Allergies:  Prior to Admission medications    Medication Sig Start Date End Date Taking? Authorizing Provider   apixaban (ELIQUIS) 2 5 mg Take 1 tablet (2 5 mg total) by mouth 2 (two) times a day 1/16/21  Yes Mirtha Levine MD   FINASTERIDE PO Take 5 mg by mouth daily   Yes Historical Provider, MD   furosemide (Lasix) 40 mg tablet    Yes Historical Provider, MD   levothyroxine 50 mcg tablet Take 50 mcg by mouth daily   Yes Historical Provider, MD   lisinopril (ZESTRIL) 20 mg tablet Take 2 5 mg by mouth daily    Yes Historical Provider, MD   tamsulosin (FLOMAX) 0 4 mg Take 1 capsule (0 4 mg total) by mouth daily with dinner 1/16/21  Yes Mirtha Levine MD     I have reviewed home medications with patient family member  Allergies: No Known Allergies    Social History:  Marital Status:    Occupation: Noncontributory   Patient Pre-hospital Living Situation: Home  Patient Pre-hospital Level of Mobility: walks with walker  Patient Pre-hospital Diet Restrictions: None  Substance Use History:   Social History     Substance and Sexual Activity   Alcohol Use No     Social History     Tobacco Use   Smoking Status Former Smoker   Smokeless Tobacco Never Used     Social History     Substance and Sexual Activity   Drug Use No       Family History:  History reviewed  No pertinent family history  Physical Exam:     Vitals:   Blood Pressure: (!) 173/77 (08/14/21 1013)  Pulse: 62 (08/14/21 1013)  Temperature: 98 3 °F (36 8 °C) (08/14/21 1013)  Temp Source: Oral (08/14/21 1013)  Respirations: 16 (08/14/21 1013)  Height: 5' 5" (165 1 cm) (08/14/21 1013)  Weight - Scale: 74 4 kg (164 lb) (08/14/21 1013)  SpO2: 98 % (08/14/21 1013)    Physical Exam  Constitutional:       General: He is not in acute distress  Appearance: Normal appearance  He is normal weight  He is not ill-appearing or diaphoretic  HENT:      Head: Normocephalic and atraumatic  Mouth/Throat:      Mouth: Mucous membranes are moist    Eyes:      General: No scleral icterus  Pupils: Pupils are equal, round, and reactive to light  Cardiovascular:      Rate and Rhythm: Normal rate  Rhythm irregularly irregular  Pulses: Normal pulses  Heart sounds: Normal heart sounds, S1 normal and S2 normal  No murmur heard  No systolic murmur is present  No diastolic murmur is present  No gallop  No S3 or S4 sounds  Pulmonary:      Effort: Pulmonary effort is normal  No accessory muscle usage or respiratory distress  Breath sounds: Normal breath sounds  No stridor  No wheezing, rhonchi or rales  Chest:      Chest wall: No tenderness  Abdominal:      General: Bowel sounds are normal  There is no distension  Palpations: Abdomen is soft  Tenderness: There is no abdominal tenderness  There is no guarding  Genitourinary:     Comments: Garcia present with punch colored urine     Musculoskeletal:      Right lower le+ Pitting Edema present  Left lower le+ Pitting Edema present  Skin:     General: Skin is warm and dry  Coloration: Skin is not jaundiced  Neurological:      General: No focal deficit present  Mental Status: He is alert  Mental status is at baseline  Motor: No tremor or seizure activity  Psychiatric:         Behavior: Behavior is cooperative            Additional Data:     Lab Results:  Results from last 7 days   Lab Units 21  1242   WBC Thousand/uL 10 24*   HEMOGLOBIN g/dL 12 0   HEMATOCRIT % 36 8   PLATELETS Thousands/uL 298   NEUTROS PCT % 82*   LYMPHS PCT % 11*   MONOS PCT % 5   EOS PCT % 0     Results from last 7 days   Lab Units 21  1242   SODIUM mmol/L 135*   POTASSIUM mmol/L 4 1   CHLORIDE mmol/L 99*   CO2 mmol/L 26   BUN mg/dL 29*   CREATININE mg/dL 1 54*   ANION GAP mmol/L 10   CALCIUM mg/dL 8 7   ALBUMIN g/dL 3 4*   TOTAL BILIRUBIN mg/dL 0 67   ALK PHOS U/L 104   ALT U/L 15   AST U/L 23   GLUCOSE RANDOM mg/dL 135     Results from last 7 days   Lab Units 08/14/21  1242   INR  1 31*                   Imaging: Reviewed radiology reports from this admission including: abdominal/pelvic CT  CT abdomen pelvis wo contrast   Final Result by Sunil Saldaña MD (08/14 1421)      Marked prostatomegaly with Garcia catheter in the bladder and note of a 3 cm hyperdense abnormality in the right aspect of the bladder, likely representing blood clot  Follow-up evaluation with urology is recommended  No evidence of obstructing renal or ureteral calculi  Mild nonspecific bilateral perinephric stranding  Moderate right pleural effusion and right basilar atelectasis  Questionable sigmoid soft tissue mass on image 64 of series 2  This is similar in location but unclear if it is a true abnormality versus adherent  Follow-up evaluation with the gastroenterology service is recommended  Workstation performed: [id number]             EKG and Other Studies Reviewed on Admission:   · EKG: No EKG obtained  · CT abdomen pelvis: Marked prostatomegaly with Garcia catheter in the bladder and note of a 3 cm hyperdense abnormality in the right aspect of the bladder, likely representing a blood clot  No evidence of obstructing renal or ureteral calculi  Mild nonspecific bilateral peripnephric stranding  Moderate right pleural effusion and right basilar atelectasis  Questionable sigmoid soft tissue mass    ** Please Note: This note has been constructed using a voice recognition system   **

## 2021-08-14 NOTE — ED PROVIDER NOTES
History  Chief Complaint   Patient presents with    Blood in Urine     with dysuria  started this morning     27-year-old male presents the ED for evaluation of dysuria and hematuria that started this morning  He has family states that he has been urinating blood today, he lives alone, family lives nearby  No fevers or chills  He admits to having some suprapubic pressure and discomfort and inability to completely evacuate  Something similar happened 8 months ago, was referred to urologist but never followed up  History provided by:  Patient   used: No        Prior to Admission Medications   Prescriptions Last Dose Informant Patient Reported? Taking? FINASTERIDE PO 8/14/2021 at Unknown time  Yes Yes   Sig: Take 5 mg by mouth daily   apixaban (ELIQUIS) 2 5 mg 8/14/2021 at Unknown time  No Yes   Sig: Take 1 tablet (2 5 mg total) by mouth 2 (two) times a day   furosemide (Lasix) 40 mg tablet 8/14/2021 at Unknown time  Yes Yes   levothyroxine 50 mcg tablet 8/14/2021 at Unknown time  Yes Yes   Sig: Take 50 mcg by mouth daily   lisinopril (ZESTRIL) 20 mg tablet 8/14/2021 at Unknown time  Yes Yes   Sig: Take 2 5 mg by mouth daily    tamsulosin (FLOMAX) 0 4 mg 8/14/2021 at Unknown time  No Yes   Sig: Take 1 capsule (0 4 mg total) by mouth daily with dinner      Facility-Administered Medications: None       Past Medical History:   Diagnosis Date    CHF (congestive heart failure) (HCC)     Disease of thyroid gland     Hypertension        History reviewed  No pertinent surgical history  History reviewed  No pertinent family history  I have reviewed and agree with the history as documented      E-Cigarette/Vaping    E-Cigarette Use Never User      E-Cigarette/Vaping Substances     Social History     Tobacco Use    Smoking status: Former Smoker    Smokeless tobacco: Never Used   Vaping Use    Vaping Use: Never used   Substance Use Topics    Alcohol use: No    Drug use: No       Review of Systems   Genitourinary: Positive for difficulty urinating, frequency, hematuria and urgency  All other systems reviewed and are negative  Physical Exam  Physical Exam  Vitals and nursing note reviewed  Constitutional:       General: He is not in acute distress  Appearance: Normal appearance  He is normal weight  HENT:      Head: Normocephalic and atraumatic  Right Ear: External ear normal       Left Ear: External ear normal       Nose: Nose normal  No congestion or rhinorrhea  Mouth/Throat:      Mouth: Mucous membranes are moist       Pharynx: Oropharynx is clear  Eyes:      General: No scleral icterus  Right eye: No discharge  Left eye: No discharge  Conjunctiva/sclera: Conjunctivae normal    Cardiovascular:      Rate and Rhythm: Normal rate and regular rhythm  Pulses: Normal pulses  Heart sounds: Normal heart sounds  Pulmonary:      Effort: Pulmonary effort is normal  No respiratory distress  Breath sounds: Normal breath sounds  Abdominal:      General: Abdomen is flat  There is distension  Palpations: Abdomen is soft  Tenderness: There is abdominal tenderness  Genitourinary:     Comments: Blood clots at urethral meatus  Suprapubic tenderness and mild distention  Musculoskeletal:         General: No swelling  Normal range of motion  Cervical back: Normal range of motion and neck supple  Skin:     General: Skin is warm and dry  Capillary Refill: Capillary refill takes less than 2 seconds  Neurological:      General: No focal deficit present  Mental Status: He is alert and oriented to person, place, and time  Mental status is at baseline     Psychiatric:         Mood and Affect: Mood normal          Behavior: Behavior normal          Vital Signs  ED Triage Vitals [08/14/21 1013]   Temperature Pulse Respirations Blood Pressure SpO2   98 3 °F (36 8 °C) 62 16 (!) 173/77 98 %      Temp Source Heart Rate Source Patient Position - Orthostatic VS BP Location FiO2 (%)   Oral Monitor Sitting Left arm --      Pain Score       --           Vitals:    08/14/21 1013   BP: (!) 173/77   Pulse: 62   Patient Position - Orthostatic VS: Sitting         Visual Acuity  Visual Acuity      Most Recent Value   L Pupil Size (mm)  3   R Pupil Size (mm)  3          ED Medications  Medications   sodium chloride 0 9 % bolus 500 mL (0 mL Intravenous Stopped 8/14/21 1340)       Diagnostic Studies  Results Reviewed     Procedure Component Value Units Date/Time    Urine culture [[id number]]     Lab Status: No result Specimen: Urine     Urine Microscopic [[id number]]  (Abnormal) Collected: 08/14/21 1214    Lab Status: Final result Specimen: Urine, Other Updated: 08/14/21 1327     RBC, UA Innumerable /hpf      WBC, UA None Seen /hpf      Epithelial Cells None Seen /hpf      Bacteria, UA None Seen /hpf     Protime-INR [[id number]]  (Abnormal) Collected: 08/14/21 1242    Lab Status: Final result Specimen: Blood from Arm, Right Updated: 08/14/21 1324     Protime 16 4 seconds      INR 1 31    APTT [[id number]]  (Normal) Collected: 08/14/21 1242    Lab Status: Final result Specimen: Blood from Arm, Right Updated: 08/14/21 1324     PTT 33 seconds     Comprehensive metabolic panel [[id number]]  (Abnormal) Collected: 08/14/21 1242    Lab Status: Final result Specimen: Blood from Arm, Right Updated: 08/14/21 1318     Sodium 135 mmol/L      Potassium 4 1 mmol/L      Chloride 99 mmol/L      CO2 26 mmol/L      ANION GAP 10 mmol/L      BUN 29 mg/dL      Creatinine 1 54 mg/dL      Glucose 135 mg/dL      Calcium 8 7 mg/dL      Corrected Calcium 9 2 mg/dL      AST 23 U/L      ALT 15 U/L      Alkaline Phosphatase 104 U/L      Total Protein 7 2 g/dL      Albumin 3 4 g/dL      Total Bilirubin 0 67 mg/dL      eGFR 39 ml/min/1 73sq m     Narrative:      [place] guidelines for Chronic Kidney Disease (CKD):     Stage 1 with normal or high GFR (GFR > 90 mL/min/1 73 square meters)    Stage 2 Mild CKD (GFR = 60-89 mL/min/1 73 square meters)    Stage 3A Moderate CKD (GFR = 45-59 mL/min/1 73 square meters)    Stage 3B Moderate CKD (GFR = 30-44 mL/min/1 73 square meters)    Stage 4 Severe CKD (GFR = 15-29 mL/min/1 73 square meters)    Stage 5 End Stage CKD (GFR <15 mL/min/1 73 square meters)  Note: GFR calculation is accurate only with a steady state creatinine    CBC and differential [[id number]]  (Abnormal) Collected: 08/14/21 1242    Lab Status: Final result Specimen: Blood from Arm, Right Updated: 08/14/21 1252     WBC 10 24 Thousand/uL      RBC 3 75 Million/uL      Hemoglobin 12 0 g/dL      Hematocrit 36 8 %      MCV 98 fL      MCH 32 0 pg      MCHC 32 6 g/dL      RDW 14 8 %      MPV 9 8 fL      Platelets 851 Thousands/uL      nRBC 0 /100 WBCs      Neutrophils Relative 82 %      Immat GRANS % 1 %      Lymphocytes Relative 11 %      Monocytes Relative 5 %      Eosinophils Relative 0 %      Basophils Relative 1 %      Neutrophils Absolute 8 41 Thousands/µL      Immature Grans Absolute 0 08 Thousand/uL      Lymphocytes Absolute 1 17 Thousands/µL      Monocytes Absolute 0 51 Thousand/µL      Eosinophils Absolute 0 01 Thousand/µL      Basophils Absolute 0 06 Thousands/µL     UA w Reflex to Microscopic w Reflex to Culture [[id number]]  (Abnormal) Collected: 08/14/21 1214    Lab Status: Final result Specimen: Urine, Other Updated: 08/14/21 1248     Color, UA Red     Clarity, UA Turbid     Specific [place], UA 1 020     pH, UA 7 0     Leukocytes, UA Trace     Nitrite, UA Positive     Protein, UA >=300 mg/dl      Glucose, UA Negative mg/dl      Ketones, UA Negative mg/dl      Urobilinogen, UA 0 2 E U /dl      Bilirubin, UA Small     Blood, UA Large                 CT abdomen pelvis wo contrast   Final Result by Carlito Khan MD (08/14 1421)      Marked prostatomegaly with Garcia catheter in the bladder and note of a 3 cm hyperdense abnormality in the right aspect of the bladder, likely representing blood clot  Follow-up evaluation with urology is recommended  No evidence of obstructing renal or ureteral calculi  Mild nonspecific bilateral perinephric stranding  Moderate right pleural effusion and right basilar atelectasis  Questionable sigmoid soft tissue mass on image 64 of series 2  This is similar in location but unclear if it is a true abnormality versus adherent  Follow-up evaluation with the gastroenterology service is recommended  Workstation performed: [id number]                    Procedures  Procedures         ED Course  ED Course as of Aug 14 1649   Sat Aug 14, 2021   1221 Pt urinated about 100 cc of haroon blood  I performed a bedside US which shows bladder distention and clots  Will place larkin catheter  1312 Larkin placed by RN, put out large amount of bloody urine  Pt feeling better  RN will irrigate bladder  1437 Pt baseline creatinine recently 1 30, bun 39 per family who has results on their mobile device  [address] [place] [address], accepts under Dr Abdi Sylvester  D/w urology RONN [place], will see in consult and recommends continuous bladder irrigation                                                MDM  Number of Diagnoses or Management Options  Gross hematuria: new and requires workup  Renal insufficiency: new and requires workup     Amount and/or Complexity of Data Reviewed  Clinical lab tests: ordered and reviewed  Tests in the radiology section of CPT®: ordered and reviewed  Decide to obtain previous medical records or to obtain history from someone other than the patient: yes  Obtain history from someone other than the patient: yes  Discuss the patient with other providers: yes    Risk of Complications, Morbidity, and/or Mortality  Presenting problems: moderate  Diagnostic procedures: moderate  Management options: moderate    Patient Progress  Patient progress: stable      Disposition  Final diagnoses:   Gross hematuria Renal insufficiency     Time reflects when diagnosis was documented in both MDM as applicable and the Disposition within this note     Time User Action Codes Description Comment    8/14/2021  3:15 PM Fredderick  [R31 0] Gross hematuria     8/14/2021  3:15 PM Murl Christopher Add [N28 9] Renal insufficiency       ED Disposition     ED Disposition Condition Date/Time Comment    Admit Stable Sat Aug 14, 2021  3:15 PM Case was discussed with Eve Oconnell and the patient's admission status was agreed to be Admission Status: inpatient status to the service of Dr Rikki Nael   Follow-up Information    None         Patient's Medications   Discharge Prescriptions    No medications on file     No discharge procedures on file      PDMP Review     None          ED Provider  Electronically Signed by           Roxie Lima DO  08/14/21 [id number]

## 2021-08-14 NOTE — ASSESSMENT & PLAN NOTE
· POA, unclear etiology, however patient is on Eliquis  No signs of infection   Currently on CBI  · Admit patient to med/surg under inpatient status   · Continue CBI  · Hold Eliquis   · Urology consultation

## 2021-08-14 NOTE — ASSESSMENT & PLAN NOTE
Wt Readings from Last 3 Encounters:   08/14/21 74 4 kg (164 lb)   01/16/21 76 2 kg (168 lb)   01/23/17 97 7 kg (215 lb 6 2 oz)     · Patient appears at baseline  Has some leg swelling, but this is normal per family   No respiratory complaints   · Hold Lasix for today

## 2021-08-15 LAB
ANION GAP SERPL CALCULATED.3IONS-SCNC: 9 MMOL/L (ref 4–13)
BUN SERPL-MCNC: 32 MG/DL (ref 5–25)
CALCIUM SERPL-MCNC: 8.4 MG/DL (ref 8.3–10.1)
CHLORIDE SERPL-SCNC: 102 MMOL/L (ref 100–108)
CO2 SERPL-SCNC: 26 MMOL/L (ref 21–32)
CREAT SERPL-MCNC: 1.34 MG/DL (ref 0.6–1.3)
ERYTHROCYTE [DISTWIDTH] IN BLOOD BY AUTOMATED COUNT: 14.9 % (ref 11.6–15.1)
GFR SERPL CREATININE-BSD FRML MDRD: 46 ML/MIN/1.73SQ M
GLUCOSE SERPL-MCNC: 88 MG/DL (ref 65–140)
HCT VFR BLD AUTO: 35.2 % (ref 36.5–49.3)
HGB BLD-MCNC: 11.3 G/DL (ref 12–17)
MCH RBC QN AUTO: 31.7 PG (ref 26.8–34.3)
MCHC RBC AUTO-ENTMCNC: 32.1 G/DL (ref 31.4–37.4)
MCV RBC AUTO: 99 FL (ref 82–98)
PLATELET # BLD AUTO: 272 THOUSANDS/UL (ref 149–390)
PMV BLD AUTO: 10.2 FL (ref 8.9–12.7)
POTASSIUM SERPL-SCNC: 4.6 MMOL/L (ref 3.5–5.3)
RBC # BLD AUTO: 3.57 MILLION/UL (ref 3.88–5.62)
SODIUM SERPL-SCNC: 137 MMOL/L (ref 136–145)
WBC # BLD AUTO: 8.44 THOUSAND/UL (ref 4.31–10.16)

## 2021-08-15 PROCEDURE — 80048 BASIC METABOLIC PNL TOTAL CA: CPT | Performed by: PHYSICIAN ASSISTANT

## 2021-08-15 PROCEDURE — 36415 COLL VENOUS BLD VENIPUNCTURE: CPT | Performed by: PHYSICIAN ASSISTANT

## 2021-08-15 PROCEDURE — 85027 COMPLETE CBC AUTOMATED: CPT | Performed by: PHYSICIAN ASSISTANT

## 2021-08-15 PROCEDURE — 99232 SBSQ HOSP IP/OBS MODERATE 35: CPT | Performed by: INTERNAL MEDICINE

## 2021-08-15 PROCEDURE — 99223 1ST HOSP IP/OBS HIGH 75: CPT | Performed by: PHYSICIAN ASSISTANT

## 2021-08-15 RX ADMIN — LEVOTHYROXINE SODIUM 50 MCG: 50 TABLET ORAL at 05:38

## 2021-08-15 RX ADMIN — FINASTERIDE 5 MG: 5 TABLET, FILM COATED ORAL at 09:12

## 2021-08-15 RX ADMIN — TAMSULOSIN HYDROCHLORIDE 0.4 MG: 0.4 CAPSULE ORAL at 16:12

## 2021-08-15 NOTE — ASSESSMENT & PLAN NOTE
Lab Results   Component Value Date    EGFR 46 08/15/2021    EGFR 39 08/14/2021    EGFR 27 01/16/2021    CREATININE 1 34 (H) 08/15/2021    CREATININE 1 54 (H) 08/14/2021    CREATININE 2 09 (H) 01/16/2021     Assessment:  Creatinine appears at baseline/slightly above baseline and with a baseline eGFR and Cr at 22-27, and 2 09-2 46 respectively this is evidence of continuing CKD      Plan:  - Hold Lasix, Lisinopril  - Monitor BMP  - Avoid hypotension, nephrotoxins  - Consider restarting diuretic and ACEI 08/16/2021

## 2021-08-15 NOTE — ASSESSMENT & PLAN NOTE
Assessment:  POA, unclear etiology, however patient is on Eliquis  No signs of infection   Currently on CBI    Plan:  - Admit patient to med/surg under inpatient status   - Continue CBI  - Hold Eliquis   - Urology consultation

## 2021-08-15 NOTE — CONSULTS
[id number] [facility], [place] NOTE   Admission Date: 8/14/2021    Patient Identifiers: Derrick Rizzo (MRN: [id number])  Service Requesting Consultation: Jesse Perera MD  Service Providing Consultation:  Urology, Colby Bocanegra PA-C  Consults  Date of Service: 8/15/2021    Reason for Consultation:  Gross hematuria    History of Present Illness:     Derrick Rizzo is a 80 y o  male who previously saw Dr Antonietta Chew until he was about 80years old  He had seen Dr Selene Titus us and in January of this year saw Dr Abdi Fabian with a history of acute and chronic renal failure  He developed gross hematuria and was started on Bactrim for what was thought to be a UTI  Unfortunately on Bactrim he developed worsening renal failure  It does not appear there was any further follow-up after that episode  He now presents to [address] [place] [address] with worsening hematuria  He reports bloody urine  Of course he is on Eliquis for atrial fibrillation  His legs were slightly swollen he is admitted to the hospital for evaluation  CT the abdomen and pelvis showed marked prostatomegaly with a 3 cm hyperdense abnormality in the right aspect of the bladder likely representing clot  There was no stone or hydronephrosis  H&H 11 3 and 35 2  Creatinine is 1 34  Today his urine is clear yellow and he is off bladder irrigation  Past Medical, Past Surgical History:     Past Medical History:   Diagnosis Date    CHF (congestive heart failure) ([facility] [address] )     Disease of thyroid gland     Hypertension    :    History reviewed   No pertinent surgical history :    Medications, Allergies:     Current Facility-Administered Medications:     acetaminophen (TYLENOL) tablet 650 mg, 650 mg, Oral, Q6H PRN, Terrie Sagastume PA-C    finasteride (PROSCAR) tablet 5 mg, 5 mg, Oral, Daily, Audi Su PA-C, 5 mg at 08/15/21 0912    levothyroxine tablet 50 mcg, 50 mcg, Oral, Early Morning, Audi Manrique PA-C, 50 mcg at 08/15/21 0538    ondansetron (ZOFRAN) injection 4 mg, 4 mg, Intravenous, Q6H PRN, Rosy Arceo PA-C    tamsulosin (FLOMAX) capsule 0 4 mg, 0 4 mg, Oral, Daily With Dinner, Audi Su PA-C, 0 4 mg at 08/14/21 1801    Allergies:  No Known Allergies:    Social and Family History:   Social History:   Social History     Tobacco Use    Smoking status: Former Smoker    Smokeless tobacco: Never Used   Vaping Use    Vaping Use: Never used   Substance Use Topics    Alcohol use: No    Drug use: No        Social History     Tobacco Use   Smoking Status Former Smoker   Smokeless Tobacco Never Used       Family History:  History reviewed  No pertinent family history :     Review of Systems:     General: Fever, chills, or night sweats: negative  Cardiac: Negative for chest pain  Pulmonary: Negative for shortness of breath  Gastrointestinal: Abdominal pain negative  Nausea, vomiting, or diarrhea negative,  Genitourinary: See HPI above  Patient does not have hematuria  All other systems queried were negative  Physical Exam:   General: Patient is pleasant and in NAD  Awake and alert  /60 (BP Location: Left arm)   Pulse 64   Temp 97 9 °F (36 6 °C) (Oral)   Resp 16   Ht 5' 5" (1 651 m)   Wt 74 4 kg (164 lb)   SpO2 98%   BMI 27 29 kg/m²   HEENT:  Conjunctiva are clear  Constitutional:  pleasant and cooperative     no apparent distress  Cardiac: Peripheral edema: positive  Pulmonary: Non-labored breathing  Abdomen: Soft, non-tender, non-distended  No surgical scars  No masses, tenderness, hernias noted  Genitourinary: Negative CVA tenderness, negative suprapubic tenderness  Extremities:  Neurological:CNII-XII intact  No numbness or tingling  Essentially non focal neurologic exam  Psychiatric:mood affect and behavior normal    (Male): Penis uncircumcised, phallus normal, meatus patent  Testicles descended into scrotum bilaterally without masses nor tenderness    No inguinal hernias bilaterally  ALAS: in place draining clear yellow urine  no clots and       Labs:     Lab Results   Component Value Date    HGB 11 3 (L) 08/15/2021    HCT 35 2 (L) 08/15/2021    WBC 8 44 08/15/2021     08/15/2021   ]    Lab Results   Component Value Date    K 4 6 08/15/2021     08/15/2021    CO2 26 08/15/2021    BUN 32 (H) 08/15/2021    CREATININE 1 34 (H) 08/15/2021    CALCIUM 8 4 08/15/2021   ]    Imaging:   I personally reviewed the images and report of the following studies, and reviewed them with the patient:  CT ABDOMEN AND PELVIS WITHOUT IV CONTRAST   IMPRESSION:     Marked prostatomegaly with Alas catheter in the bladder and note of a 3 cm hyperdense abnormality in the right aspect of the bladder, likely representing blood clot  Follow-up evaluation with urology is recommended  No evidence of obstructing renal or ureteral calculi  Mild nonspecific bilateral perinephric stranding  Moderate right pleural effusion and right basilar atelectasis      ASSESSMENT:     1  Gross hematuria  2  Acute kidney injury on chronic kidney disease  3  Atrial fibrillation on Eliquis   4  History of congestive heart failure    PLAN:   -urine is now clear  -if it remains clear his catheter can come out in the morning  -recommend outpatient follow-up and cystoscopy in the office  -continue tamsulosin and finasteride      Thank you for allowing me to participate in this patients care  Please do not hesitate to call with any additional questions    Aydin Tucker PA-C

## 2021-08-15 NOTE — ASSESSMENT & PLAN NOTE
Assessment:  BP elevated in ED  BP has now stabilized      Plan:   - Hold Lisinopril for today   - Monitor

## 2021-08-15 NOTE — PROGRESS NOTES
[facility]  Progress Note Cindy Yates 7/23/1929, 80 y o  male MRN: [id number]  Unit/Bed#: ED 26 Encounter: [id number]  Primary Care Provider: Jessenia Lyn MD   Date and time admitted to hospital: 8/14/2021 11:54 AM    * Gross hematuria  Assessment & Plan  Assessment:  POA, unclear etiology, however patient is on Eliquis  No signs of infection  Currently on CBI    Plan:  - Admit patient to med/surg under inpatient status   - Continue CBI  - Hold Eliquis   - Urology consultation      Stage 3 chronic kidney disease [facility])  Assessment & Plan  Lab Results   Component Value Date    EGFR 46 08/15/2021    EGFR 39 08/14/2021    EGFR 27 01/16/2021    CREATININE 1 34 (H) 08/15/2021    CREATININE 1 54 (H) 08/14/2021    CREATININE 2 09 (H) 01/16/2021     Assessment:  Creatinine appears at baseline/slightly above baseline and with a baseline eGFR and Cr at 22-27, and 2 09-2 46 respectively this is evidence of continuing CKD  Plan:  - Hold Lasix, Lisinopril  - Monitor BMP  - Avoid hypotension, nephrotoxins  - Consider restarting diuretic and ACEI 08/16/2021    Atrial fibrillation [facility])  Assessment & Plan  Assessment:  Present on admission    Plan:  - Not on rate control interventions as HR is 49-64  - Hold Eliquis     Chronic diastolic congestive heart failure (HCC)  Assessment & Plan  Wt Readings from Last 3 Encounters:   08/14/21 74 4 kg (164 lb)   01/16/21 76 2 kg (168 lb)   01/23/17 97 7 kg (215 lb 6 2 oz)     Assessment:  Patient appears at baseline  Has some leg swelling, but this is normal per family  No respiratory complaints  Plan:  Hold Lasix for today  Essential hypertension  Assessment & Plan  Assessment:  BP elevated in ED  BP has now stabilized  Plan:   - Hold Lisinopril for today   - Monitor     VTE Pharmacologic Prophylaxis: VTE Score: 4 Moderate Risk (Score 3-4) - Pharmacological DVT Prophylaxis Contraindicated  Sequential Compression Devices Ordered      Patient Centered Rounds: Discussed with IM team   Discussions with Specialists or Other Care Team Provider: Urology Consulted, Gastroenterology Consulted    Current Length of Stay: 1 day(s)  Current Patient Status: Inpatient   Discharge Plan: Jorge Tobias is following this patient on consult  They are not yet medically stable for discharge secondary to hematuria  Code Status: Level 3 - DNAR and DNI    Subjective:   08/15/2021: Pt appears to be laying in bed in ED26 comfortable an in no acute distress  Pt denies CP, SOB, changes in sensorium, HA, paraesthesias, weakness  Last BM was , able to urinate well, and states that he is getting hungry  Denies any bladder pain, denies fever chills, N/V/D/C  Last episode of hematuria was 8 months ago, was due to an infection  Pt took a pill from a Dr from an online consultation, "Entresto", and stated that it caused kidney damage  No complaints otherwise  Objective:     Vitals:   Temp (24hrs), Av 9 °F (36 6 °C), Min:97 9 °F (36 6 °C), Max:97 9 °F (36 6 °C)    Temp:  [97 9 °F (36 6 °C)] 97 9 °F (36 6 °C)  HR:  [49-68] 64  Resp:  [16-18] 16  BP: (109-133)/(59-65) 133/60  SpO2:  [96 %-99 %] 98 %  Body mass index is 27 29 kg/m²  Input and Output Summary (last 24 hours): Intake/Output Summary (Last 24 hours) at 8/15/2021 1133  Last data filed at 8/15/2021 0900  Gross per 24 hour   Intake 86679 ml   Output 8550 ml   Net 3950 ml       Physical Exam:   Physical Exam  Vitals reviewed  Constitutional:       Appearance: He is obese  HENT:      Head: Normocephalic and atraumatic  Mouth/Throat:      Dentition: Abnormal dentition  Pharynx: Oropharynx is clear  Comments: Poor dentition with missing teeth and severe grinding of teeth from age  Eyes:      Extraocular Movements: Extraocular movements intact  Pupils: Pupils are equal, round, and reactive to light  Cardiovascular:      Rate and Rhythm: Regular rhythm  Bradycardia present        Pulses: Decreased pulses  Pulmonary:      Effort: Pulmonary effort is normal    Chest:      Breasts:         Right: Normal          Left: Normal    Abdominal:      General: Bowel sounds are normal       Palpations: Abdomen is soft  Musculoskeletal:         General: Normal range of motion  Cervical back: Normal range of motion  Comments: Uses walker to ambulate  Skin:     General: Skin is warm  Capillary Refill: Capillary refill takes 2 to 3 seconds  Findings: Laceration and lesion present  Neurological:      General: No focal deficit present  Mental Status: He is alert  Mental status is at baseline  Cranial Nerves: Cranial nerves are intact  Sensory: Sensation is intact  Motor: Motor function is intact  Deep Tendon Reflexes: Babinski sign absent on the right side  Babinski sign absent on the left side  Reflex Scores:       Tricep reflexes are 1+ on the right side and 1+ on the left side  Bicep reflexes are 1+ on the right side and 1+ on the left side  Brachioradialis reflexes are 1+ on the right side and 1+ on the left side  Patellar reflexes are 1+ on the right side and 1+ on the left side  Achilles reflexes are 1+ on the right side and 1+ on the left side  Psychiatric:         Attention and Perception: Attention and perception normal          Mood and Affect: Mood and affect normal          Speech: Speech normal          Behavior: Behavior normal  Behavior is cooperative  Thought Content:  Thought content normal        Additional Data:     Labs:  Results from last 7 days   Lab Units 08/15/21  0514 08/14/21  1242   WBC Thousand/uL 8 44 10 24*   HEMOGLOBIN g/dL 11 3* 12 0   HEMATOCRIT % 35 2* 36 8   PLATELETS Thousands/uL 272 298   NEUTROS PCT %  --  82*   LYMPHS PCT %  --  11*   MONOS PCT %  --  5   EOS PCT %  --  0     Results from last 7 days   Lab Units 08/15/21  0514 08/14/21  1242   SODIUM mmol/L 137 135*   POTASSIUM mmol/L 4 6 4  1   CHLORIDE mmol/L 102 99*   CO2 mmol/L 26 26   BUN mg/dL 32* 29*   CREATININE mg/dL 1 34* 1 54*   ANION GAP mmol/L 9 10   CALCIUM mg/dL 8 4 8 7   ALBUMIN g/dL  --  3 4*   TOTAL BILIRUBIN mg/dL  --  0 67   ALK PHOS U/L  --  104   ALT U/L  --  15   AST U/L  --  23   GLUCOSE RANDOM mg/dL 88 135     Results from last 7 days   Lab Units 08/14/21  1242   INR  1 31*                   Lines/Drains:  Invasive Devices     Peripheral Intravenous Line            Peripheral IV 08/14/21 Right Antecubital <1 day          Drain            Urethral Catheter Three way 18 Fr  <1 day              Urinary Catheter:  Goal for removal: CBI ongoing  Imaging: Reviewed radiology reports from this admission including: abdominal/pelvic CT    Recent Cultures (last 7 days):       Last 24 Hours Medication List:   Current Facility-Administered Medications   Medication Dose Route Frequency Provider Last Rate    acetaminophen  650 mg Oral Q6H PRN Eri Celaya PA-C      finasteride  5 mg Oral Daily Audi Smith PA-C      levothyroxine  50 mcg Oral Early Morning Audi Smith PA-C      ondansetron  4 mg Intravenous Q6H PRN Eri Celaya PA-C      tamsulosin  0 4 mg Oral Daily With Dinner Audi Smith PA-C          Today, Patient Was Seen By: Kianna Abraham DO    **Please Note: This note may have been constructed using a voice recognition system  **

## 2021-08-15 NOTE — ASSESSMENT & PLAN NOTE
Wt Readings from Last 3 Encounters:   08/14/21 74 4 kg (164 lb)   01/16/21 76 2 kg (168 lb)   01/23/17 97 7 kg (215 lb 6 2 oz)     Assessment:  Patient appears at baseline  Has some leg swelling, but this is normal per family  No respiratory complaints  Plan:  Hold Lasix for today

## 2021-08-15 NOTE — CONSULTS
Consultation - 126 [facility]  Derrick Rizzo 80 y o  male MRN: [id number]  Unit/Bed#: S -01 Encounter: [id number]        Consults    Reason for Consult / Principal Problem:  Abnormal sigmoid colon on CT scan    HPI: Derrick Rizzo is a 80y o  year old male with history of congestive heart failure, hypothyroidism who presented to the hospital with complaints of hematuria of acute onset  Patient to me denies any dysuria or pain with urination, he also denies any bright red blood in his stools or melena, and tells me that his bowel movements have been regular  He says he has never had a colonoscopy  Our service were consulted as his CT scan at apparently demonstrated appearance of abnormality in the sigmoid colon; there is question of a soft tissue mass in the sigmoid colon which was also noted on a CT scan from January  The patient tells me he is not interested in invasive workup at this time, and would not be interested in surgery or chemotherapy if he were found to have colon cancer  He denies any family history of colon cancer, and denies any other digestive issues he experiences such as acid reflux, heartburn, dysphagia, indigestion  He is currently eating solid food and reports to be tolerating this well  REVIEW OF SYSTEMS:    CONSTITUTIONAL: Denies any fever, chills, or rigors  Good appetite, and no recent weight loss  HEENT: No earache or tinnitus  Denies hearing loss or visual disturbances  CARDIOVASCULAR: No chest pain or palpitations  RESPIRATORY: Denies any cough, hemoptysis, shortness of breath or dyspnea on exertion  GASTROINTESTINAL: As noted in the History of Present Illness  GENITOURINARY: No problems with urination  Denies any hematuria or dysuria  NEUROLOGIC: No dizziness or vertigo, denies headaches  MUSCULOSKELETAL: Denies any muscle or joint pain  SKIN: Denies skin rashes or itching     ENDOCRINE: Denies excessive thirst  Denies intolerance to heat or cold  PSYCHOSOCIAL: Denies depression or anxiety  Denies any recent memory loss  Historical Information   Past Medical History:   Diagnosis Date    CHF (congestive heart failure) (HCC)     Disease of thyroid gland     Hypertension      History reviewed  No pertinent surgical history  Social History   Social History     Substance and Sexual Activity   Alcohol Use No     Social History     Substance and Sexual Activity   Drug Use No     Social History     Tobacco Use   Smoking Status Former Smoker   Smokeless Tobacco Never Used     History reviewed  No pertinent family history  Meds/Allergies     Medications Prior to Admission   Medication    apixaban (ELIQUIS) 2 5 mg    FINASTERIDE PO    furosemide (Lasix) 40 mg tablet    levothyroxine 50 mcg tablet    lisinopril (ZESTRIL) 20 mg tablet    tamsulosin (FLOMAX) 0 4 mg     Current Facility-Administered Medications   Medication Dose Route Frequency    acetaminophen (TYLENOL) tablet 650 mg  650 mg Oral Q6H PRN    finasteride (PROSCAR) tablet 5 mg  5 mg Oral Daily    levothyroxine tablet 50 mcg  50 mcg Oral Early Morning    ondansetron (ZOFRAN) injection 4 mg  4 mg Intravenous Q6H PRN    tamsulosin (FLOMAX) capsule 0 4 mg  0 4 mg Oral Daily With Dinner       No Known Allergies        Objective     Blood pressure 129/65, pulse 74, temperature 98 °F (36 7 °C), temperature source Oral, resp  rate 16, height 5' 5" (1 651 m), weight 74 4 kg (164 lb), SpO2 96 %        Intake/Output Summary (Last 24 hours) at 8/15/2021 1548  Last data filed at 8/15/2021 1159  Gross per 24 hour   Intake 11428 ml   Output 8582 ml   Net 3418 ml         PHYSICAL EXAM     General Appearance:   Alert, cooperative, no distress, appears stated age    HEENT:   Normocephalic, atraumatic, anicteric      Neck:  Supple, symmetrical, trachea midline, no adenopathy;    thyroid: no enlargement/tenderness/nodules; no carotid  bruit or JVD    Lungs:   Clear to auscultation bilaterally; no rales, rhonchi or wheezing; respirations unlabored    Heart[de-identified]   S1 and S2 normal; regular rate and rhythm; no murmur, rub, or gallop     Abdomen:   Soft, non-tender, non-distended; normal bowel sounds; no masses, no organomegaly    Genitalia:   Deferred    Rectal:   Deferred    Extremities:  No cyanosis, clubbing or edema    Pulses:  2+ and symmetric all extremities    Skin:  Skin color, texture, turgor normal, no rashes or lesions    Lymph nodes:  No palpable cervical, axillary or inguinal lymphadenopathy        Lab Results:   Admission on 08/14/2021   Component Date Value    WBC 08/14/2021 10 24*    RBC 08/14/2021 3 75*    Hemoglobin 08/14/2021 12 0     Hematocrit 08/14/2021 36 8     MCV 08/14/2021 98     MCH 08/14/2021 32 0     MCHC 08/14/2021 32 6     RDW 08/14/2021 14 8     MPV 08/14/2021 9 8     Platelets 18/40/2026 298     nRBC 08/14/2021 0     Neutrophils Relative 08/14/2021 82*    Immat GRANS % 08/14/2021 1     Lymphocytes Relative 08/14/2021 11*    Monocytes Relative 08/14/2021 5     Eosinophils Relative 08/14/2021 0     Basophils Relative 08/14/2021 1     Neutrophils Absolute 08/14/2021 8 41*    Immature Grans Absolute 08/14/2021 0 08     Lymphocytes Absolute 08/14/2021 1 17     Monocytes Absolute 08/14/2021 0 51     Eosinophils Absolute 08/14/2021 0 01     Basophils Absolute 08/14/2021 0 06     Sodium 08/14/2021 135*    Potassium 08/14/2021 4 1     Chloride 08/14/2021 99*    CO2 08/14/2021 26     ANION GAP 08/14/2021 10     BUN 08/14/2021 29*    Creatinine 08/14/2021 1 54*    Glucose 08/14/2021 135     Calcium 08/14/2021 8 7     Corrected Calcium 08/14/2021 9 2     AST 08/14/2021 23     ALT 08/14/2021 15     Alkaline Phosphatase 08/14/2021 104     Total Protein 08/14/2021 7 2     Albumin 08/14/2021 3 4*    Total Bilirubin 08/14/2021 0 67     eGFR 08/14/2021 39     Color, UA 08/14/2021 Red     Clarity, UA 08/14/2021 Turbid     Specific [place], UA 08/14/2021 1 020     pH, UA 08/14/2021 7 0     Leukocytes, UA 08/14/2021 Trace*    Nitrite, UA 08/14/2021 Positive*    Protein, UA 08/14/2021 >=300*    Glucose, UA 08/14/2021 Negative     Ketones, UA 08/14/2021 Negative     Urobilinogen, UA 08/14/2021 0 2     Bilirubin, UA 08/14/2021 Small*    Blood, UA 08/14/2021 Large*    Protime 08/14/2021 16 4*    INR 08/14/2021 1 31*    PTT 08/14/2021 33     RBC, UA 08/14/2021 Innumerable*    WBC, UA 08/14/2021 None Seen     Epithelial Cells 08/14/2021 None Seen     Bacteria, UA 08/14/2021 None Seen     Sodium 08/15/2021 137     Potassium 08/15/2021 4 6     Chloride 08/15/2021 102     CO2 08/15/2021 26     ANION GAP 08/15/2021 9     BUN 08/15/2021 32*    Creatinine 08/15/2021 1 34*    Glucose 08/15/2021 88     Calcium 08/15/2021 8 4     eGFR 08/15/2021 46     WBC 08/15/2021 8 44     RBC 08/15/2021 3 57*    Hemoglobin 08/15/2021 11 3*    Hematocrit 08/15/2021 35 2*    MCV 08/15/2021 99*    MCH 08/15/2021 31 7     MCHC 08/15/2021 32 1     RDW 08/15/2021 14 9     Platelets 79/56/4877 272     MPV 08/15/2021 10 2      Results for Sim Moss (MRN [id number]) as of 8/15/2021 15:48   Ref   Range 8/14/2021 12:14 8/14/2021 12:42 8/15/2021 05:14   Sodium Latest Ref Range: 136 - 145 mmol/L  135 (L) 137   Potassium Latest Ref Range: 3 5 - 5 3 mmol/L  4 1 4 6   Chloride Latest Ref Range: 100 - 108 mmol/L  99 (L) 102   CO2 Latest Ref Range: 21 - 32 mmol/L  26 26   Anion Gap Latest Ref Range: 4 - 13 mmol/L  10 9   BUN Latest Ref Range: 5 - 25 mg/dL  29 (H) 32 (H)   Creatinine Latest Ref Range: 0 60 - 1 30 mg/dL  1 54 (H) 1 34 (H)   Glucose, Random Latest Ref Range: 65 - 140 mg/dL  135 88   Calcium Latest Ref Range: 8 3 - 10 1 mg/dL  8 7 8 4   CORRECTED CALCIUM Latest Ref Range: 8 3 - 10 1 mg/dL  9 2    AST Latest Ref Range: 5 - 45 U/L  23    ALT Latest Ref Range: 12 - 78 U/L  15    Alkaline Phosphatase Latest Ref Range: 46 - 116 U/L  104    Total Protein Latest Ref Range: 6 4 - 8 2 g/dL  7 2    Albumin Latest Ref Range: 3 5 - 5 0 g/dL  3 4 (L)    TOTAL BILIRUBIN Latest Ref Range: 0 20 - 1 00 mg/dL  0 67    eGFR Latest Units: ml/min/1 73sq m  39 46   WBC Latest Ref Range: 4 31 - 10 16 Thousand/uL  10 24 (H) 8 44   Red Blood Cell Count Latest Ref Range: 3 88 - 5 62 Million/uL  3 75 (L) 3 57 (L)   Hemoglobin Latest Ref Range: 12 0 - 17 0 g/dL  12 0 11 3 (L)   HCT Latest Ref Range: 36 5 - 49 3 %  36 8 35 2 (L)   MCV Latest Ref Range: 82 - 98 fL  98 99 (H)   MCH Latest Ref Range: 26 8 - 34 3 pg  32 0 31 7   MCHC Latest Ref Range: 31 4 - 37 4 g/dL  32 6 32 1   RDW Latest Ref Range: 11 6 - 15 1 %  14 8 14 9   Platelet Count Latest Ref Range: 149 - 390 Thousands/uL  298 272   MPV Latest Ref Range: 8 9 - 12 7 fL  9 8 10 2   nRBC Latest Units: /100 WBCs  0    Neutrophils % Latest Ref Range: 43 - 75 %  82 (H)    Immat GRANS % Latest Ref Range: 0 - 2 %  1    Lymphocytes Relative Latest Ref Range: 14 - 44 %  11 (L)    Monocytes Relative Latest Ref Range: 4 - 12 %  5    Eosinophils Latest Ref Range: 0 - 6 %  0    Basophils Relative Latest Ref Range: 0 - 1 %  1    Immature Grans Absolute Latest Ref Range: 0 00 - 0 20 Thousand/uL  0 08    Absolute Neutrophils Latest Ref Range: 1 85 - 7 62 Thousands/µL  8 41 (H)    Lymphocytes Absolute Latest Ref Range: 0 60 - 4 47 Thousands/µL  1 17    Absolute Monocytes Latest Ref Range: 0 17 - 1 22 Thousand/µL  0 51    Absolute Eosinophils Latest Ref Range: 0 00 - 0 61 Thousand/µL  0 01    Basophils Absolute Latest Ref Range: 0 00 - 0 10 Thousands/µL  0 06    Protime Latest Ref Range: 11 6 - 14 5 seconds  16 4 (H)    INR Latest Ref Range: 0 84 - 1 19   1 31 (H)    PTT Latest Ref Range: 23 - 37 seconds  33    Epithelial Cells Latest Ref Range: None Seen, Occasional /hpf None Seen     Color, UA Unknown Red     Clarity, UA Unknown Turbid     SL AMB SPECIFIC GRAVITY_URINE Latest Ref Range: 1 003 - 1 030  1 020     Glucose, UA Latest Ref Range: Negative mg/dl Negative     Ketones, UA Latest Ref Range: Negative mg/dl Negative     Blood, UA Latest Ref Range: Negative  Large (A)     Nitrite, UA Latest Ref Range: Negative  Positive (A)     Leukocytes, UA Latest Ref Range: Negative  Trace (A)     pH, UA Latest Ref Range: 4 5, 5 0, 5 5, 6 0, 6 5, 7 0, 7 5, 8 0  7 0     POCT URINE PROTEIN Latest Ref Range: Negative mg/dl >=300 (A)     Bilirubin, UA Latest Ref Range: Negative  Small (A)     SL AMB POCT UROBILINOGEN Latest Ref Range: 0 2, 1 0 E U /dl E U /dl 0 2     RBC, UA Latest Ref Range: None Seen, 0-1, 1-2, 2-4, 0-5 /hpf Innumerable (A)     WBC, UA Latest Ref Range: None Seen, 0-1, 1-2, 0-5, 2-4 /hpf None Seen     Bacteria, UA Latest Ref Range: None Seen, Occasional /hpf None Seen     URINE CULTURE Unknown Rpt ((NONE))         Imaging Studies: I have personally reviewed pertinent reports  CT ABDOMEN AND PELVIS WITHOUT IV CONTRAST     INDICATION:   gross hematuria, distention      COMPARISON:  CT abdomen and pelvis study of January 15, 2021  Ultrasound of the kidneys and bladder from January 14, 2021      TECHNIQUE:  CT examination of the abdomen and pelvis was performed without intravenous contrast   Axial, sagittal, and coronal 2D reformatted images were created from the source data and submitted for interpretation       Radiation dose length product (DLP) for this visit:  342 mGy-cm   This examination, like all CT scans performed in the [facility], was performed utilizing techniques to minimize radiation dose exposure, including the use of iterative   reconstruction and automated exposure control       Enteric contrast was not administered       FINDINGS:     ABDOMEN     LOWER CHEST:  Moderate-sized right pleural effusion with adjacent compressive right lower lobe atelectasis  Coronary vascular calcifications    Small left pleural effusion      LIVER/BILIARY TREE:  Scattered calcified granulomas      GALLBLADDER:  No calcified gallstones  No pericholecystic inflammatory change      SPLEEN:  Unremarkable      PANCREAS:  Unremarkable      ADRENAL GLANDS:  Unremarkable      KIDNEYS/URETERS:  Mild bilateral perinephric stranding  No obstructing renal calculi  Normal ureteral size      STOMACH AND BOWEL:  Unremarkable  Previously noted sigmoid soft tissue mass is difficult to define on the current study  There is a questionable persistent abnormality within the sigmoid colon on image 64 of series 2        APPENDIX:  No findings to suggest appendicitis      ABDOMINOPELVIC CAVITY:  No ascites  No pneumoperitoneum  No lymphadenopathy      VESSELS:  Atherosclerotic changes are present  No evidence of aneurysm      PELVIS     REPRODUCTIVE ORGANS:  Markedly enlarged prostate gland indenting the base of the bladder  The prostate measures 6 5 x 6 1 cm      URINARY BLADDER:  Garcia catheter within the decompressed bladder  There is an asymmetric lobulated hyperdense region in the right margin of the bladder measuring 2 9 x 1 6 x 3 3 cm  Small amount of gas in the nondependent portion of the bladder likely   related to recent instrumentation      ABDOMINAL WALL/INGUINAL REGIONS:  Unremarkable      OSSEOUS STRUCTURES:  No acute fracture or destructive osseous lesion  Levoscoliosis and multilevel lumbar spondylosis  Multilevel spinal stenosis, most pronounced in the lower lumbar spine      IMPRESSION:     Marked prostatomegaly with Garcia catheter in the bladder and note of a 3 cm hyperdense abnormality in the right aspect of the bladder, likely representing blood clot  Follow-up evaluation with urology is recommended      No evidence of obstructing renal or ureteral calculi  Mild nonspecific bilateral perinephric stranding      Moderate right pleural effusion and right basilar atelectasis      Questionable sigmoid soft tissue mass on image 64 of series 2  This is similar in location but unclear if it is a true abnormality versus adherent  Follow-up evaluation with the gastroenterology service is recommended           ASSESSMENT/PLAN:     1  Abnormal appearing sigmoid on CT scan, findings appear to be stable compared to CT scan from January; patient denies any GI symptomatology at this time and presented with hematuria  The finding is nonspecific but cannot exclude underlying malignancy at this time, or precancerous adenoma    - I discussed in detail with patient about different possibilities that could account for the CT scan findings, up to and including malignancy of the colon  I also explained that the only way to assuredly evaluate for such pathology would be colonoscopy, and I explained to him about colonoscopy procedure including methodology, prep process, risks and benefits, etc   The patient expressed preference to hold off on colonoscopy or invasive evaluation at this time, telling me he would prefer not to undergo surgery or chemotherapy even if he were found to have colon cancer  I made him aware of the possible complications of undetected and untreated colon cancer  He appears lucid and properly oriented and competent  I told him we are certainly available to discuss about colonoscopy if he changes his mind, we can also discuss again about colonoscopy if he does develop GI symptomatology that we can look into  - management for hematuria as per medical team; monitor stool output in the meantime    The patient was seen and examined by Dr Izabel Castillo, all clancy medical decisions were made with Dr Izabel Castillo   Thank you for allowing us to participate in the care of this pleasant patient  We will follow up with you closely

## 2021-08-16 LAB
ANION GAP SERPL CALCULATED.3IONS-SCNC: 8 MMOL/L (ref 4–13)
BACTERIA UR CULT: NORMAL
BASOPHILS # BLD AUTO: 0.07 THOUSANDS/ΜL (ref 0–0.1)
BASOPHILS NFR BLD AUTO: 1 % (ref 0–1)
BUN SERPL-MCNC: 31 MG/DL (ref 5–25)
CALCIUM SERPL-MCNC: 8.7 MG/DL (ref 8.3–10.1)
CHLORIDE SERPL-SCNC: 102 MMOL/L (ref 100–108)
CO2 SERPL-SCNC: 26 MMOL/L (ref 21–32)
CREAT SERPL-MCNC: 1.32 MG/DL (ref 0.6–1.3)
EOSINOPHIL # BLD AUTO: 0.15 THOUSAND/ΜL (ref 0–0.61)
EOSINOPHIL NFR BLD AUTO: 1 % (ref 0–6)
ERYTHROCYTE [DISTWIDTH] IN BLOOD BY AUTOMATED COUNT: 15.1 % (ref 11.6–15.1)
GFR SERPL CREATININE-BSD FRML MDRD: 47 ML/MIN/1.73SQ M
GLUCOSE SERPL-MCNC: 91 MG/DL (ref 65–140)
HCT VFR BLD AUTO: 36.9 % (ref 36.5–49.3)
HGB BLD-MCNC: 12.2 G/DL (ref 12–17)
IMM GRANULOCYTES # BLD AUTO: 0.05 THOUSAND/UL (ref 0–0.2)
IMM GRANULOCYTES NFR BLD AUTO: 1 % (ref 0–2)
LYMPHOCYTES # BLD AUTO: 3.46 THOUSANDS/ΜL (ref 0.6–4.47)
LYMPHOCYTES NFR BLD AUTO: 32 % (ref 14–44)
MCH RBC QN AUTO: 32.6 PG (ref 26.8–34.3)
MCHC RBC AUTO-ENTMCNC: 33.1 G/DL (ref 31.4–37.4)
MCV RBC AUTO: 99 FL (ref 82–98)
MONOCYTES # BLD AUTO: 0.98 THOUSAND/ΜL (ref 0.17–1.22)
MONOCYTES NFR BLD AUTO: 9 % (ref 4–12)
NEUTROPHILS # BLD AUTO: 6.01 THOUSANDS/ΜL (ref 1.85–7.62)
NEUTS SEG NFR BLD AUTO: 56 % (ref 43–75)
NRBC BLD AUTO-RTO: 0 /100 WBCS
PLATELET # BLD AUTO: 295 THOUSANDS/UL (ref 149–390)
PMV BLD AUTO: 10.8 FL (ref 8.9–12.7)
POTASSIUM SERPL-SCNC: 4.6 MMOL/L (ref 3.5–5.3)
RBC # BLD AUTO: 3.74 MILLION/UL (ref 3.88–5.62)
SODIUM SERPL-SCNC: 136 MMOL/L (ref 136–145)
WBC # BLD AUTO: 10.72 THOUSAND/UL (ref 4.31–10.16)

## 2021-08-16 PROCEDURE — 99232 SBSQ HOSP IP/OBS MODERATE 35: CPT | Performed by: INTERNAL MEDICINE

## 2021-08-16 PROCEDURE — 85025 COMPLETE CBC W/AUTO DIFF WBC: CPT

## 2021-08-16 PROCEDURE — 80048 BASIC METABOLIC PNL TOTAL CA: CPT

## 2021-08-16 PROCEDURE — 99232 SBSQ HOSP IP/OBS MODERATE 35: CPT | Performed by: NURSE PRACTITIONER

## 2021-08-16 RX ORDER — OXYBUTYNIN CHLORIDE 5 MG/1
5 TABLET, EXTENDED RELEASE ORAL DAILY
Status: DISCONTINUED | OUTPATIENT
Start: 2021-08-16 | End: 2021-08-16

## 2021-08-16 RX ORDER — CEPHALEXIN 500 MG/1
500 CAPSULE ORAL EVERY 12 HOURS SCHEDULED
Status: DISCONTINUED | OUTPATIENT
Start: 2021-08-16 | End: 2021-08-16

## 2021-08-16 RX ORDER — CEPHALEXIN 500 MG/1
500 CAPSULE ORAL EVERY 8 HOURS SCHEDULED
Status: DISCONTINUED | OUTPATIENT
Start: 2021-08-16 | End: 2021-08-17

## 2021-08-16 RX ADMIN — CEPHALEXIN 500 MG: 500 CAPSULE ORAL at 18:18

## 2021-08-16 RX ADMIN — OXYBUTYNIN CHLORIDE 5 MG: 5 TABLET, EXTENDED RELEASE ORAL at 12:35

## 2021-08-16 RX ADMIN — LEVOTHYROXINE SODIUM 50 MCG: 50 TABLET ORAL at 06:37

## 2021-08-16 RX ADMIN — TAMSULOSIN HYDROCHLORIDE 0.4 MG: 0.4 CAPSULE ORAL at 18:16

## 2021-08-16 RX ADMIN — FINASTERIDE 5 MG: 5 TABLET, FILM COATED ORAL at 08:06

## 2021-08-16 NOTE — ASSESSMENT & PLAN NOTE
Lab Results   Component Value Date    EGFR 47 08/16/2021    EGFR 46 08/15/2021    EGFR 39 08/14/2021    CREATININE 1 32 (H) 08/16/2021    CREATININE 1 34 (H) 08/15/2021    CREATININE 1 54 (H) 08/14/2021     Assessment:  Creatinine appears at baseline/slightly above baseline and with a baseline eGFR and Cr at 22-27, and 2 09-2 46 respectively this is evidence of continuing CKD      Plan:  - Hold Lasix, Lisinopril  - Monitor BMP  - Avoid hypotension, nephrotoxins  - Consider restarting diuretic and ACEI 08/16/2021

## 2021-08-16 NOTE — PROGRESS NOTES
[facility]  Progress Note Pao Thaoilippantonio 7/23/1929, 80 y o  male MRN: [id number]  Unit/Bed#: S -01 Encounter: [id number]  Primary Care Provider: Artist Simmonds, MD   Date and time admitted to hospital: 8/14/2021 11:54 AM    Atrial fibrillation [facility])  Assessment & Plan  Assessment:  Present on admission    Plan:  - Not on rate control interventions as HR is 49-64  - Hold Eliquis     Chronic diastolic congestive heart failure ([facility] [address] )  Assessment & Plan  Wt Readings from Last 3 Encounters:   08/14/21 74 4 kg (164 lb)   01/16/21 76 2 kg (168 lb)   01/23/17 97 7 kg (215 lb 6 2 oz)     Assessment:  Patient appears at baseline  Has some leg swelling, but this is normal per family  No respiratory complaints  Plan:  Monitor and follow Lasix p r n  Stage 3 chronic kidney disease [facility])  Assessment & Plan  Lab Results   Component Value Date    EGFR 47 08/16/2021    EGFR 46 08/15/2021    EGFR 39 08/14/2021    CREATININE 1 32 (H) 08/16/2021    CREATININE 1 34 (H) 08/15/2021    CREATININE 1 54 (H) 08/14/2021     Assessment:  Creatinine appears at baseline/slightly above baseline and with a baseline eGFR and Cr at 22-27, and 2 09-2 46 respectively this is evidence of continuing CKD  Plan:  - Hold Lasix, Lisinopril  - Monitor BMP  - Avoid hypotension, nephrotoxins  - Consider restarting diuretic and ACEI 08/16/2021    Essential hypertension  Assessment & Plan  Assessment:  BP elevated in ED  BP has now stabilized  Plan:   - Hold Lisinopril for today   - Monitor     * Gross hematuria  Assessment & Plan  Assessment:  No signs of infection  Garcia in place red urine, red sediment apparent      Plan:  - Currently on CBI follow with Urology    - Hold Eliquis   - Urology consultation              VTE Pharmacologic Prophylaxis:   VTE Score: 4 Not currently    Mechanical VTE Prophylaxis in Place: Yes    Patient Centered Rounds: With IM team    Discussions with Specialists or Other Care Team Provider: Dr Marilee Olea    Education and Discussions with Family / Patient: Patient    Current Length of Stay: 2 day(s)    Current Patient Status: Inpatient     Discharge Plan / Estimated Discharge Date: Unknown at present    Code Status: Level 3 - DNAR and DNI      Subjective:   Patient was checked at the bedside where he was in no distress  He had no complaints of pain at this time  Understands urology will be seeing him as well    Objective:     Vitals:   Temp (24hrs), Av 4 °F (36 9 °C), Min:98 °F (36 7 °C), Max:98 7 °F (37 1 °C)    Temp:  [98 °F (36 7 °C)-98 7 °F (37 1 °C)] 98 7 °F (37 1 °C)  HR:  [55-74] 58  Resp:  [16-18] 18  BP: (110-129)/(58-65) 110/58  SpO2:  [96 %-97 %] 97 %  Body mass index is 27 29 kg/m²  Input and Output Summary (last 24 hours): Intake/Output Summary (Last 24 hours) at 2021 1457  Last data filed at 2021 0701  Gross per 24 hour   Intake --   Output 240 ml   Net -240 ml       Physical Exam:     Physical Exam  Constitutional:       General: He is not in acute distress  HENT:      Head: Normocephalic  Eyes:      Extraocular Movements: Extraocular movements intact  Cardiovascular:      Rate and Rhythm: Normal rate and regular rhythm  Heart sounds: No murmur heard  Pulmonary:      Breath sounds: No wheezing or rales  Abdominal:      General: Abdomen is flat  Palpations: Abdomen is soft  Genitourinary:     Comments: Garcia catheter draining red, with red sediment  Musculoskeletal:      Right lower leg: Edema (1+) present  Left lower leg: Edema (1+) present  Skin:     General: Skin is warm and dry  Neurological:      General: No focal deficit present  Mental Status: He is alert  Psychiatric:         Mood and Affect: Mood normal          Thought Content:  Thought content normal           Additional Data:     Labs:  Results from last 7 days   Lab Units 21  0637   WBC Thousand/uL 10 72*   HEMOGLOBIN g/dL 12 2   HEMATOCRIT % 36 9 PLATELETS Thousands/uL 295   NEUTROS PCT % 56   LYMPHS PCT % 32   MONOS PCT % 9   EOS PCT % 1     Results from last 7 days   Lab Units 08/16/21  0637 08/14/21  1242   SODIUM mmol/L 136 135*   POTASSIUM mmol/L 4 6 4 1   CHLORIDE mmol/L 102 99*   CO2 mmol/L 26 26   BUN mg/dL 31* 29*   CREATININE mg/dL 1 32* 1 54*   ANION GAP mmol/L 8 10   CALCIUM mg/dL 8 7 8 7   ALBUMIN g/dL  --  3 4*   TOTAL BILIRUBIN mg/dL  --  0 67   ALK PHOS U/L  --  104   ALT U/L  --  15   AST U/L  --  23   GLUCOSE RANDOM mg/dL 91 135     Results from last 7 days   Lab Units 08/14/21  1242   INR  1 31*                   Imaging: Reviewed radiology reports from this admission including: abdominal/pelvic CT scan    Recent Cultures (last 7 days):     Results from last 7 days   Lab Units 08/14/21  1214   URINE CULTURE  <10,000 cfu/ml        Lines/Drains:  Invasive Devices     Peripheral Intravenous Line            Peripheral IV 08/14/21 Right Antecubital 2 days          Drain            Urethral Catheter Three way 18 Fr  2 days                Telemetry:        Last 24 Hours Medication List:   Current Facility-Administered Medications   Medication Dose Route Frequency Provider Last Rate    acetaminophen  650 mg Oral Q6H PRN Arcelia Loco PA-C      finasteride  5 mg Oral Daily Audi Guido Nahed, PA-FABIENNE      levothyroxine  50 mcg Oral Early Morning Audi Guido Dockery PA-FABIENNE      ondansetron  4 mg Intravenous Q6H PRN Audi Guido Nahed, PA-C      tamsulosin  0 4 mg Oral Daily With Dinner Audimartita Dockery PA-C          Today, Patient Was Seen By: Shayna You MD    ** Please Note: This note has been constructed using a voice recognition system   **

## 2021-08-16 NOTE — ASSESSMENT & PLAN NOTE
Wt Readings from Last 3 Encounters:   08/14/21 74 4 kg (164 lb)   01/16/21 76 2 kg (168 lb)   01/23/17 97 7 kg (215 lb 6 2 oz)     Assessment:  Patient appears at baseline  Has some leg swelling, but this is normal per family  No respiratory complaints  Plan:  Monitor and follow Lasix p r n

## 2021-08-16 NOTE — PLAN OF CARE
Problem: MOBILITY - ADULT  Goal: Maintain or return to baseline ADL function  Description: INTERVENTIONS:  -  Assess patient's ability to carry out ADLs; assess patient's baseline for ADL function and identify physical deficits which impact ability to perform ADLs (bathing, care of mouth/teeth, toileting, grooming, dressing, etc )  - Assess/evaluate cause of self-care deficits   - Assess range of motion  - Assess patient's mobility; develop plan if impaired  - Assess patient's need for assistive devices and provide as appropriate  - Encourage maximum independence but intervene and supervise when necessary  - Involve family in performance of ADLs  - Assess for home care needs following discharge   - Consider OT consult to assist with ADL evaluation and planning for discharge  - Provide patient education as appropriate  Outcome: Progressing  Goal: Maintains/Returns to pre admission functional level  Description: INTERVENTIONS:  - Perform BMAT or MOVE assessment daily    - Set and communicate daily mobility goal to care team and patient/family/caregiver  - Collaborate with rehabilitation services on mobility goals if consulted  - Perform Range of Motion 3 times a day  - Reposition patient every 2 hours    - Stand patient 3 times a day  - Ambulate patient 3 times a day  - Out of bed to chair 3 times a day   - Out of bed for meals 3 times a day  - Out of bed for toileting  - Record patient progress and toleration of activity level   Outcome: Progressing     Problem: Potential for Falls  Goal: Patient will remain free of falls  Description: INTERVENTIONS:  - Educate patient/family on patient safety including physical limitations  - Instruct patient to call for assistance with activity   - Consult OT/PT to assist with strengthening/mobility   - Keep Call bell within reach  - Keep bed low and locked with side rails adjusted as appropriate  - Keep care items and personal belongings within reach  - Initiate and maintain comfort rounds  - Make Fall Risk Sign visible to staff  - Offer Toileting every 2 Hours, in advance of need  - Initiate/Maintain bed alarm  - Apply yellow socks and bracelet for high fall risk patients  - Consider moving patient to room near nurses station  Outcome: Progressing     Problem: GENITOURINARY - ADULT  Goal: Maintains or returns to baseline urinary function  Description: INTERVENTIONS:  - Assess urinary function  - Encourage oral fluids to ensure adequate hydration if ordered  - Administer IV fluids as ordered to ensure adequate hydration  - Administer ordered medications as needed  - Offer frequent toileting  - Follow urinary retention protocol if ordered  Outcome: Progressing  Goal: Absence of urinary retention  Description: INTERVENTIONS:  - Assess patients ability to void and empty bladder  - Monitor I/O  - Bladder scan as needed  - Discuss with physician/AP medications to alleviate retention as needed  - Discuss catheterization for long term situations as appropriate  Outcome: Progressing  Goal: Urinary catheter remains patent  Description: INTERVENTIONS:  - Assess patency of urinary catheter  - If patient has a chronic larkin, consider changing catheter if non-functioning  - Follow guidelines for intermittent irrigation of non-functioning urinary catheter  Outcome: Progressing

## 2021-08-16 NOTE — ASSESSMENT & PLAN NOTE
Assessment:  No signs of infection  Garcia in place red urine, red sediment apparent      Plan:  - Currently on CBI follow with Urology    - Hold Eliquis   - Urology consultation

## 2021-08-16 NOTE — ASSESSMENT & PLAN NOTE
Assessment:  Present on admission    Plan:  - Not on rate control interventions as HR is 49-64  - Hold Eliquis

## 2021-08-16 NOTE — PROGRESS NOTES
Progress Note - Francis Yates 80 y o  male MRN: [id number]    Unit/Bed#: S -04 Encounter: [id number]      Assessment:  Rachael Godinez is a 66-year-old male with history of atrial fibrillation, chronically anticoagulated with history of significant prostatomegaly, confirmed on recent CT, of possible UTI and gross hematuria  Three-way Garcia catheter inserted on presentation for aggressive bladder irrigation  Patient has remained hemodynamically stable  Garcia catheter remains in-situ for melon colored urine, clear without evidence of clot retention  Patient denies fever, chills, flank, abdominal or suprapubic pain, nor testicular or groin pain  Urinalysis initially nitrite positive  However urine culture was contaminated  Patient's family expressed concern over recent CT finding of right lateral bladder lesion measuring 2 9 x 1 6 x 3 3 cm  Plan:  1  Continue medical optimization and symptom management  2  Patient remains hemodynamically stable  3  However, given that patient has been on Eliquis since January without hematuria with borderline urinalysis, per for treatment with short course of antimicrobials  4  Explain to both patient and family at the bedside, cystoscopic examination may be prudent in this case, however while inpatient is reserved for individuals with hemodynamically instability, severe symptoms and evidence of clot retention with extremis  5  Prefer short course of antimicrobials to sterilize urine  Prior to instrumentation  6  Patient/family will need to consider clinical utility of aggressive treatment  7  Furthermore, for multiple episodes of hematuria requiring hospitalization, would need to examine risk versus benefits ratio of continued anticoagulation given advanced age  8  Will proceed with void trial in the a m   9  Discontinue all anticholinergic and continue to hold Eliquis until urine is clear for 48 hours  10   Will be difficult to predict urinary retention protocol  11  Will arrange for outpatient cystoscopic examination once discharged  12  Discussed with patient and family at the bedside for a minimum of 45 minutes  Subjective:   Denies fever, chills, flank, abdominal, suprapubic, groin or testicular pain  Objective:     Vitals: Blood pressure 132/78, pulse 59, temperature 97 7 °F (36 5 °C), temperature source Oral, resp  rate 18, height 5' 5" (1 651 m), weight 74 4 kg (164 lb), SpO2 96 %  ,Body mass index is 27 29 kg/m²        Intake/Output Summary (Last 24 hours) at 8/16/2021 1729  Last data filed at 8/16/2021 0701  Gross per 24 hour   Intake --   Output 240 ml   Net -240 ml       Physical Exam: General appearance: alert and oriented, in no acute distress, appears stated age, cooperative and no distress  Head: Normocephalic, without obvious abnormality, atraumatic  Neck: no adenopathy, no carotid bruit, no JVD, supple, symmetrical, trachea midline and thyroid not enlarged, symmetric, no tenderness/mass/nodules  Lungs: diminished breath sounds  Heart: regular rate and rhythm, S1, S2 normal, no murmur, click, rub or gallop  Abdomen: soft, non-tender; bowel sounds normal; no masses,  no organomegaly  Extremities: extremities normal, warm and well-perfused; no cyanosis, clubbing, or edema  Pulses: 2+ and symmetric  Neurologic: Grossly normal  Garcia--melon colored--clear     Invasive Devices     Peripheral Intravenous Line            Peripheral IV 08/14/21 Right Antecubital 2 days          Drain            Urethral Catheter Three way 18 Fr  2 days              Lab Results   Component Value Date    WBC 10 72 (H) 08/16/2021    HGB 12 2 08/16/2021    HCT 36 9 08/16/2021    MCV 99 (H) 08/16/2021     08/16/2021     Lab Results   Component Value Date    SODIUM 136 08/16/2021    K 4 6 08/16/2021     08/16/2021    CO2 26 08/16/2021    BUN 31 (H) 08/16/2021    CREATININE 1 32 (H) 08/16/2021    GLUC 91 08/16/2021    CALCIUM 8 7 08/16/2021       Lab, Imaging and other studies: I have personally reviewed pertinent reports

## 2021-08-17 LAB
ALBUMIN SERPL BCP-MCNC: 2.8 G/DL (ref 3.5–5)
ALP SERPL-CCNC: 92 U/L (ref 46–116)
ALT SERPL W P-5'-P-CCNC: 16 U/L (ref 12–78)
ANION GAP SERPL CALCULATED.3IONS-SCNC: 7 MMOL/L (ref 4–13)
AST SERPL W P-5'-P-CCNC: 17 U/L (ref 5–45)
BASOPHILS # BLD AUTO: 0.06 THOUSANDS/ΜL (ref 0–0.1)
BASOPHILS NFR BLD AUTO: 1 % (ref 0–1)
BILIRUB SERPL-MCNC: 0.54 MG/DL (ref 0.2–1)
BUN SERPL-MCNC: 30 MG/DL (ref 5–25)
CALCIUM ALBUM COR SERPL-MCNC: 9.5 MG/DL (ref 8.3–10.1)
CALCIUM SERPL-MCNC: 8.5 MG/DL (ref 8.3–10.1)
CHLORIDE SERPL-SCNC: 103 MMOL/L (ref 100–108)
CO2 SERPL-SCNC: 26 MMOL/L (ref 21–32)
CREAT SERPL-MCNC: 1.4 MG/DL (ref 0.6–1.3)
EOSINOPHIL # BLD AUTO: 0.17 THOUSAND/ΜL (ref 0–0.61)
EOSINOPHIL NFR BLD AUTO: 2 % (ref 0–6)
ERYTHROCYTE [DISTWIDTH] IN BLOOD BY AUTOMATED COUNT: 15.2 % (ref 11.6–15.1)
GFR SERPL CREATININE-BSD FRML MDRD: 43 ML/MIN/1.73SQ M
GLUCOSE SERPL-MCNC: 93 MG/DL (ref 65–140)
HCT VFR BLD AUTO: 36.2 % (ref 36.5–49.3)
HGB BLD-MCNC: 11.5 G/DL (ref 12–17)
IMM GRANULOCYTES # BLD AUTO: 0.04 THOUSAND/UL (ref 0–0.2)
IMM GRANULOCYTES NFR BLD AUTO: 1 % (ref 0–2)
LYMPHOCYTES # BLD AUTO: 1.98 THOUSANDS/ΜL (ref 0.6–4.47)
LYMPHOCYTES NFR BLD AUTO: 25 % (ref 14–44)
MCH RBC QN AUTO: 31.9 PG (ref 26.8–34.3)
MCHC RBC AUTO-ENTMCNC: 31.8 G/DL (ref 31.4–37.4)
MCV RBC AUTO: 100 FL (ref 82–98)
MONOCYTES # BLD AUTO: 0.85 THOUSAND/ΜL (ref 0.17–1.22)
MONOCYTES NFR BLD AUTO: 11 % (ref 4–12)
NEUTROPHILS # BLD AUTO: 4.94 THOUSANDS/ΜL (ref 1.85–7.62)
NEUTS SEG NFR BLD AUTO: 60 % (ref 43–75)
NRBC BLD AUTO-RTO: 0 /100 WBCS
PLATELET # BLD AUTO: 274 THOUSANDS/UL (ref 149–390)
PMV BLD AUTO: 10.3 FL (ref 8.9–12.7)
POTASSIUM SERPL-SCNC: 4.1 MMOL/L (ref 3.5–5.3)
PROT SERPL-MCNC: 6.5 G/DL (ref 6.4–8.2)
RBC # BLD AUTO: 3.61 MILLION/UL (ref 3.88–5.62)
SODIUM SERPL-SCNC: 136 MMOL/L (ref 136–145)
WBC # BLD AUTO: 8.04 THOUSAND/UL (ref 4.31–10.16)

## 2021-08-17 PROCEDURE — 80053 COMPREHEN METABOLIC PANEL: CPT | Performed by: CHIROPRACTOR

## 2021-08-17 PROCEDURE — 99232 SBSQ HOSP IP/OBS MODERATE 35: CPT | Performed by: FAMILY MEDICINE

## 2021-08-17 PROCEDURE — 85025 COMPLETE CBC W/AUTO DIFF WBC: CPT | Performed by: CHIROPRACTOR

## 2021-08-17 RX ADMIN — TAMSULOSIN HYDROCHLORIDE 0.4 MG: 0.4 CAPSULE ORAL at 17:29

## 2021-08-17 RX ADMIN — LEVOTHYROXINE SODIUM 50 MCG: 50 TABLET ORAL at 06:29

## 2021-08-17 RX ADMIN — CEPHALEXIN 500 MG: 500 CAPSULE ORAL at 06:29

## 2021-08-17 RX ADMIN — FINASTERIDE 5 MG: 5 TABLET, FILM COATED ORAL at 07:53

## 2021-08-17 NOTE — ASSESSMENT & PLAN NOTE
Lab Results   Component Value Date    EGFR 43 08/17/2021    EGFR 47 08/16/2021    EGFR 46 08/15/2021    CREATININE 1 40 (H) 08/17/2021    CREATININE 1 32 (H) 08/16/2021    CREATININE 1 34 (H) 08/15/2021     Assessment:  Creatinine appears at baseline/slightly above baseline and with a baseline eGFR and Cr at 22-27, and 2 09-2 46 respectively this is evidence of continuing CKD      Plan:  - Hold Lasix, Lisinopril  - Monitor BMP  - Avoid hypotension, nephrotoxins

## 2021-08-17 NOTE — PLAN OF CARE
Problem: MOBILITY - ADULT  Goal: Maintain or return to baseline ADL function  Description: INTERVENTIONS:  -  Assess patient's ability to carry out ADLs; assess patient's baseline for ADL function and identify physical deficits which impact ability to perform ADLs (bathing, care of mouth/teeth, toileting, grooming, dressing, etc )  - Assess/evaluate cause of self-care deficits   - Assess range of motion  - Assess patient's mobility; develop plan if impaired  - Assess patient's need for assistive devices and provide as appropriate  - Encourage maximum independence but intervene and supervise when necessary  - Involve family in performance of ADLs  - Assess for home care needs following discharge   - Consider OT consult to assist with ADL evaluation and planning for discharge  - Provide patient education as appropriate  Outcome: Progressing         Problem: Potential for Falls  Goal: Patient will remain free of falls  Description: INTERVENTIONS:  - Educate patient/family on patient safety including physical limitations  - Instruct patient to call for assistance with activity   - Consult OT/PT to assist with strengthening/mobility   - Keep Call bell within reach  - Keep bed low and locked with side rails adjusted as appropriate  - Keep care items and personal belongings within reach  - Initiate and maintain comfort rounds  - Make Fall Risk Sign visible to staff  - Apply yellow socks and bracelet for high fall risk patients  - Consider moving patient to room near nurses station  Outcome: Progressing     Problem: GENITOURINARY - ADULT  Goal: Maintains or returns to baseline urinary function  Description: INTERVENTIONS:  - Assess urinary function  - Encourage oral fluids to ensure adequate hydration if ordered  - Administer IV fluids as ordered to ensure adequate hydration  - Administer ordered medications as needed  - Offer frequent toileting  - Follow urinary retention protocol if ordered  Outcome: Progressing  Goal: Absence of urinary retention  Description: INTERVENTIONS:  - Assess patients ability to void and empty bladder  - Monitor I/O  - Bladder scan as needed  - Discuss with physician/AP medications to alleviate retention as needed  - Discuss catheterization for long term situations as appropriate  Outcome: Progressing  Goal: Urinary catheter remains patent  Description: INTERVENTIONS:  - Assess patency of urinary catheter  - If patient has a chronic larkin, consider changing catheter if non-functioning  - Follow guidelines for intermittent irrigation of non-functioning urinary catheter  Outcome: Progressing

## 2021-08-17 NOTE — ASSESSMENT & PLAN NOTE
Assessment:  No signs of infection  Garcia in place red urine, red sediment apparent      Plan:  - Currently on CBI follow with Urology    - Hold Eliquis until urine free of blood for 48 hours per Urology  - Urology following

## 2021-08-17 NOTE — PLAN OF CARE
Problem: MOBILITY - ADULT  Goal: Maintain or return to baseline ADL function  Description: INTERVENTIONS:  -  Assess patient's ability to carry out ADLs; assess patient's baseline for ADL function and identify physical deficits which impact ability to perform ADLs (bathing, care of mouth/teeth, toileting, grooming, dressing, etc )  - Assess/evaluate cause of self-care deficits   - Assess range of motion  - Assess patient's mobility; develop plan if impaired  - Assess patient's need for assistive devices and provide as appropriate  - Encourage maximum independence but intervene and supervise when necessary  - Involve family in performance of ADLs  - Assess for home care needs following discharge   - Consider OT consult to assist with ADL evaluation and planning for discharge  - Provide patient education as appropriate  Outcome: Progressing  Goal: Maintains/Returns to pre admission functional level  Description: INTERVENTIONS:  - Perform BMAT or MOVE assessment daily    - Set and communicate daily mobility goal to care team and patient/family/caregiver  - Collaborate with rehabilitation services on mobility goals if consulted  - Perform Range of Motion 3 times a day      - Out of bed to chair 3 times a day     - Out of bed for toileting  - Record patient progress and toleration of activity level   Outcome: Progressing

## 2021-08-18 VITALS
SYSTOLIC BLOOD PRESSURE: 104 MMHG | RESPIRATION RATE: 16 BRPM | WEIGHT: 164 LBS | HEIGHT: 65 IN | DIASTOLIC BLOOD PRESSURE: 53 MMHG | HEART RATE: 52 BPM | TEMPERATURE: 97.6 F | BODY MASS INDEX: 27.32 KG/M2 | OXYGEN SATURATION: 95 %

## 2021-08-18 LAB
ALBUMIN SERPL BCP-MCNC: 2.8 G/DL (ref 3.5–5)
ALP SERPL-CCNC: 98 U/L (ref 46–116)
ALT SERPL W P-5'-P-CCNC: 14 U/L (ref 12–78)
ANION GAP SERPL CALCULATED.3IONS-SCNC: 11 MMOL/L (ref 4–13)
AST SERPL W P-5'-P-CCNC: 18 U/L (ref 5–45)
BASOPHILS # BLD AUTO: 0.07 THOUSANDS/ΜL (ref 0–0.1)
BASOPHILS NFR BLD AUTO: 1 % (ref 0–1)
BILIRUB SERPL-MCNC: 0.62 MG/DL (ref 0.2–1)
BUN SERPL-MCNC: 31 MG/DL (ref 5–25)
CALCIUM ALBUM COR SERPL-MCNC: 9.6 MG/DL (ref 8.3–10.1)
CALCIUM SERPL-MCNC: 8.6 MG/DL (ref 8.3–10.1)
CHLORIDE SERPL-SCNC: 103 MMOL/L (ref 100–108)
CO2 SERPL-SCNC: 22 MMOL/L (ref 21–32)
CREAT SERPL-MCNC: 1.35 MG/DL (ref 0.6–1.3)
EOSINOPHIL # BLD AUTO: 0.21 THOUSAND/ΜL (ref 0–0.61)
EOSINOPHIL NFR BLD AUTO: 3 % (ref 0–6)
ERYTHROCYTE [DISTWIDTH] IN BLOOD BY AUTOMATED COUNT: 15 % (ref 11.6–15.1)
GFR SERPL CREATININE-BSD FRML MDRD: 45 ML/MIN/1.73SQ M
GLUCOSE SERPL-MCNC: 98 MG/DL (ref 65–140)
HCT VFR BLD AUTO: 36.8 % (ref 36.5–49.3)
HGB BLD-MCNC: 11.9 G/DL (ref 12–17)
IMM GRANULOCYTES # BLD AUTO: 0.07 THOUSAND/UL (ref 0–0.2)
IMM GRANULOCYTES NFR BLD AUTO: 1 % (ref 0–2)
LYMPHOCYTES # BLD AUTO: 2.71 THOUSANDS/ΜL (ref 0.6–4.47)
LYMPHOCYTES NFR BLD AUTO: 34 % (ref 14–44)
MCH RBC QN AUTO: 32.3 PG (ref 26.8–34.3)
MCHC RBC AUTO-ENTMCNC: 32.3 G/DL (ref 31.4–37.4)
MCV RBC AUTO: 100 FL (ref 82–98)
MONOCYTES # BLD AUTO: 0.93 THOUSAND/ΜL (ref 0.17–1.22)
MONOCYTES NFR BLD AUTO: 12 % (ref 4–12)
NEUTROPHILS # BLD AUTO: 4.08 THOUSANDS/ΜL (ref 1.85–7.62)
NEUTS SEG NFR BLD AUTO: 49 % (ref 43–75)
NRBC BLD AUTO-RTO: 0 /100 WBCS
PLATELET # BLD AUTO: 296 THOUSANDS/UL (ref 149–390)
PMV BLD AUTO: 10.2 FL (ref 8.9–12.7)
POTASSIUM SERPL-SCNC: 4 MMOL/L (ref 3.5–5.3)
PROT SERPL-MCNC: 6.9 G/DL (ref 6.4–8.2)
RBC # BLD AUTO: 3.68 MILLION/UL (ref 3.88–5.62)
SODIUM SERPL-SCNC: 136 MMOL/L (ref 136–145)
WBC # BLD AUTO: 8.07 THOUSAND/UL (ref 4.31–10.16)

## 2021-08-18 PROCEDURE — 99238 HOSP IP/OBS DSCHRG MGMT 30/<: CPT | Performed by: FAMILY MEDICINE

## 2021-08-18 PROCEDURE — 97163 PT EVAL HIGH COMPLEX 45 MIN: CPT

## 2021-08-18 PROCEDURE — 80053 COMPREHEN METABOLIC PANEL: CPT | Performed by: CHIROPRACTOR

## 2021-08-18 PROCEDURE — 85025 COMPLETE CBC W/AUTO DIFF WBC: CPT | Performed by: CHIROPRACTOR

## 2021-08-18 RX ORDER — FUROSEMIDE 40 MG/1
40 TABLET ORAL DAILY
Refills: 0
Start: 2021-08-22

## 2021-08-18 RX ADMIN — LEVOTHYROXINE SODIUM 50 MCG: 50 TABLET ORAL at 05:16

## 2021-08-18 RX ADMIN — FINASTERIDE 5 MG: 5 TABLET, FILM COATED ORAL at 09:31

## 2021-08-18 NOTE — ASSESSMENT & PLAN NOTE
Assessment:  No signs of infection  Patient voiding without difficulty      Plan:  - follow-up with PCP outpatient ASAP    - Hold Eliquis until Sunday, and then follow-up with PCP and Cardiology

## 2021-08-18 NOTE — ASSESSMENT & PLAN NOTE
Assessment:  No signs of infection  Patient voiding without difficulty      Plan:  - follow-up with PCP outpatient ASAP    - Hold Eliquis until Sunday, and then follow-up with PCP  -

## 2021-08-18 NOTE — PROGRESS NOTES
[facility]  Progress Note [facility] Keeshailippdavido 7/23/1929, 80 y o  male MRN: [id number]  Unit/Bed#: S -01 Encounter: [id number]  Primary Care Provider: Blanca Aparicio MD   Date and time admitted to hospital: 8/14/2021 11:54 AM    Chronic diastolic congestive heart failure [facility])  Assessment & Plan  Wt Readings from Last 3 Encounters:   08/14/21 74 4 kg (164 lb)   01/16/21 76 2 kg (168 lb)   01/23/17 97 7 kg (215 lb 6 2 oz)     Assessment:  Patient appears at baseline  Has some leg swelling, but this is normal per family  No respiratory complaints  Plan:  Monitor and follow outpatient with PCP's recommendations    Stage 3 chronic kidney disease [facility])  Assessment & Plan  Lab Results   Component Value Date    EGFR 45 08/18/2021    EGFR 43 08/17/2021    EGFR 47 08/16/2021    CREATININE 1 35 (H) 08/18/2021    CREATININE 1 40 (H) 08/17/2021    CREATININE 1 32 (H) 08/16/2021     Assessment:  Creatinine appears at baseline/slightly above baseline and with a baseline eGFR and Cr at 22-27, and 2 09-2 46 respectively this is evidence of continuing CKD  Plan:  - Held Lasix, Lisinopril inpatient, will follow-up with PCP asap        Essential hypertension  Assessment & Plan  Assessment:  BP elevated in ED  BP has now stabilized  Plan    - patient will follow-up with PCP outpatient    * Gross hematuria  Assessment & Plan  Assessment:  No signs of infection  Patient voiding without difficulty      Plan:  - follow-up with PCP outpatient ASAP    - Hold Eliquis until Sunday, and then follow-up with PCP  -           Discharging Resident Physician: Lillian Bey MD  Attending: Rabia Espinoza MD  PCP: Blanca Aparicio MD  Admission Date: 8/14/2021  Discharge Date: 08/18/21    Disposition:     Home    Reason for Admission:  Hematuria    Consultations During Hospital Stay:  · Urology    Procedures Performed:   CT abdomen pelvis  Significant Findings / Test Results:     · Moderately sized right pleural effusion with right lower lobe atelectasis  Markedly enlarged prostate size, Questionable sigmoid soft tissue mass on CT    Incidental Findings:   · Questionable mass in colon on CT    Test Results Pending at Discharge (will require follow up): · None     Outpatient Tests Requested:  · Follow-up regarding hematuria and CT findings    Complications:  None    Hospital Course:     Ahsan Issa is a 80 y o  male patient who originally presented to the hospital on 8/14/2021 due to hematuria  Past medical history significant for AFib, CHF and CKD  Upon presentation he reported that on 08/13/2021 he got up to use the bathroom and went in the bucket next to his bed as he usually does  He reports that when he emptied the morning of 08/14/2021 he noted that it was bloody  He denied any pain at the time or difficulty urinating and continues to deny pain  He reported history of some hematuria in the past related to infection  He denied fevers or chills  He reports that he is on Eliquis and took his morning medications on the date of admission    D he denied any chest pain or difficulty breathing  Family Court the bedside at bedside and reported that his legs are slightly swollen at baseline  During the course of the patient's admission, his Eliquis was withheld and he was followed by Urology  He temporally had a Garcia catheter placed, had short course of antibiotics over concern for possible UTI  Antibiotics were discontinued after 2 days, Garcia has been removed and the patient has passed voiding trials without difficulty  Urine progressively has improved to now near normal   After discussion with Urology, they feel best to withhold Eliquis until Sunday  If the patient does have any recurrent bleeding he can be followed up by his physicians outpatient ASAP    The patient is also provided with detailed instructions including that to follow-up with his primary care physician upon discharge as soon as he is able to     Condition at Discharge: good       Discharge Day Visit / Exam:     Subjective: The patient states he continues to improve  He does report experiencing 2 episodes of diarrhea overnight otherwise has no complaints or issues  Reports urine progressively has improved in color and currently has no significant pain while urinating  Vitals: Blood Pressure: 104/53 (08/18/21 0704)  Pulse: (!) 52 (08/18/21 0704)  Temperature: 97 6 °F (36 4 °C) (08/18/21 0704)  Temp Source: Oral (08/18/21 0704)  Respirations: 16 (08/18/21 0704)  Height: 5' 5" (165 1 cm) (08/14/21 1013)  Weight - Scale: 74 4 kg (164 lb) (08/14/21 1013)  SpO2: 95 % (08/18/21 0704)  Exam:   Physical Exam  Constitutional:       General: He is not in acute distress  HENT:      Head: Normocephalic  Eyes:      Extraocular Movements: Extraocular movements intact  Cardiovascular:      Rate and Rhythm: Tachycardia present  Heart sounds: No murmur heard  Pulmonary:      Effort: Pulmonary effort is normal       Breath sounds: No wheezing or rales  Abdominal:      Palpations: Abdomen is soft  Tenderness: There is no abdominal tenderness  Musculoskeletal:      Right lower leg: Edema (Trace) present  Left lower leg: Edema (1+) present  Skin:     General: Skin is warm and dry  Neurological:      General: No focal deficit present  Mental Status: He is alert  Mental status is at baseline  Psychiatric:         Mood and Affect: Mood normal          Thought Content: Thought content normal          Discussion with Family:     Discharge instructions/Information to patient and family:   See after visit summary for information provided to patient and family  Provisions for Follow-Up Care:  See after visit summary for information related to follow-up care and any pertinent home health orders        Planned Readmission:  none     Discharge Medications:  See after visit summary for reconciled discharge medications provided to patient and family        ** Please Note: This note has been constructed using a voice recognition system **

## 2021-08-18 NOTE — DISCHARGE SUMMARY
[facility]  Discharge- Jama Yates 7/23/1929, 80 y o  male MRN: [id number]  Unit/Bed#: S -01 Encounter: [id number]  Primary Care Provider: Florian Porter MD   Date and time admitted to hospital: 8/14/2021 11:54 AM    Atrial fibrillation [facility])  Assessment & Plan  Assessment:  Present on admission    Plan:  - Not on rate control interventions as HR is 49-64  Follow-up with Cardiology outpatient  - Hold Eliquis until Sunday    Chronic diastolic congestive heart failure [facility])  Assessment & Plan  Wt Readings from Last 3 Encounters:   08/14/21 74 4 kg (164 lb)   01/16/21 76 2 kg (168 lb)   01/23/17 97 7 kg (215 lb 6 2 oz)     Assessment:  Patient appears at baseline  Has some leg swelling, but this is normal per family  No respiratory complaints  Plan:  Monitor and follow outpatient with PCP's recommendations    Stage 3 chronic kidney disease [facility])  Assessment & Plan  Lab Results   Component Value Date    EGFR 45 08/18/2021    EGFR 43 08/17/2021    EGFR 47 08/16/2021    CREATININE 1 35 (H) 08/18/2021    CREATININE 1 40 (H) 08/17/2021    CREATININE 1 32 (H) 08/16/2021     Assessment:  Creatinine appears at baseline/slightly above baseline and with a baseline eGFR and Cr at 22-27, and 2 09-2 46 respectively this is evidence of continuing CKD  Plan:  - Held Lasix, Lisinopril inpatient, will follow-up with PCP asap        Essential hypertension  Assessment & Plan  Assessment:  BP elevated in ED  BP has now stabilized  Plan    - patient will follow-up with PCP outpatient    * Gross hematuria  Assessment & Plan  Assessment:  No signs of infection  Patient voiding without difficulty      Plan:  - follow-up with PCP outpatient ASAP    - Hold Eliquis until Sunday, and then follow-up with PCP and Cardiology          Discharging Resident Physician: Jessica Edwards MD  Attending: Robert Monk MD  PCP: Florian Porter MD  Admission Date: 8/14/2021  Discharge Date: 08/18/21    Disposition: Home    Reason for Admission:  Hematuria    Consultations During Hospital Stay:  · Urology    Procedures Performed:   CT abdomen pelvis  Significant Findings / Test Results:     · Moderately sized right pleural effusion with right lower lobe atelectasis  Markedly enlarged prostate size, Questionable sigmoid soft tissue mass on CT    Incidental Findings:   · Questionable mass in colon on CT    Test Results Pending at Discharge (will require follow up): · None     Outpatient Tests Requested:  · Follow-up regarding hematuria and CT findings    Complications:  None    Hospital Course:     Scotty Gomez is a 80 y o  male patient who originally presented to the hospital on 8/14/2021 due to hematuria  Past medical history significant for AFib, CHF and CKD  Upon presentation he reported that on 08/13/2021 he got up to use the bathroom and went in the bucket next to his bed as he usually does  He reports that when he emptied the morning of 08/14/2021 he noted that it was bloody  He denied any pain at the time or difficulty urinating and continues to deny pain  He reported history of some hematuria in the past related to infection  He denied fevers or chills  He reports that he is on Eliquis and took his morning medications on the date of admission    D he denied any chest pain or difficulty breathing  Family Court the bedside at bedside and reported that his legs are slightly swollen at baseline  During the course of the patient's admission, his Eliquis was withheld and he was followed by Urology  He temporally had a Garcia catheter placed, had short course of antibiotics over concern for possible UTI  Antibiotics were discontinued after 2 days, Garcia has been removed and the patient has passed voiding trials without difficulty  Urine progressively has improved to now near normal   After discussion with Urology, they feel best to withhold Eliquis until Sunday  If the patient does have any recurrent bleeding he can be followed up by his physicians outpatient ASAP  The patient is also provided with detailed instructions including that to follow-up with his primary care physician upon discharge as soon as he is able to  Condition at Discharge: good       Discharge Day Visit / Exam:     Subjective: The patient states he continues to improve  He does report experiencing 2 episodes of diarrhea overnight otherwise has no complaints or issues  Reports urine progressively has improved in color and currently has no significant pain while urinating  Vitals: Blood Pressure: 104/53 (08/18/21 0704)  Pulse: (!) 52 (08/18/21 0704)  Temperature: 97 6 °F (36 4 °C) (08/18/21 0704)  Temp Source: Oral (08/18/21 0704)  Respirations: 16 (08/18/21 0704)  Height: 5' 5" (165 1 cm) (08/14/21 1013)  Weight - Scale: 74 4 kg (164 lb) (08/14/21 1013)  SpO2: 95 % (08/18/21 0704)  Exam:   Physical Exam  Constitutional:       General: He is not in acute distress  HENT:      Head: Normocephalic  Eyes:      Extraocular Movements: Extraocular movements intact  Cardiovascular:      Rate and Rhythm: Tachycardia present  Heart sounds: No murmur heard  Pulmonary:      Effort: Pulmonary effort is normal       Breath sounds: No wheezing or rales  Abdominal:      Palpations: Abdomen is soft  Tenderness: There is no abdominal tenderness  Musculoskeletal:      Right lower leg: Edema (Trace) present  Left lower leg: Edema (1+) present  Skin:     General: Skin is warm and dry  Neurological:      General: No focal deficit present  Mental Status: He is alert  Mental status is at baseline  Psychiatric:         Mood and Affect: Mood normal          Thought Content: Thought content normal          Discussion with Family:     Discharge instructions/Information to patient and family:   See after visit summary for information provided to patient and family        Provisions for Follow-Up Care:  See after visit summary for information related to follow-up care and any pertinent home health orders  Planned Readmission:  none     Discharge Medications:  See after visit summary for reconciled discharge medications provided to patient and family        ** Please Note: This note has been constructed using a voice recognition system **

## 2021-08-18 NOTE — ASSESSMENT & PLAN NOTE
Assessment:  Present on admission    Plan:  - Not on rate control interventions as HR is 49-64    Follow-up with Cardiology outpatient  - Hold Eliquis until Sunday

## 2021-08-18 NOTE — ASSESSMENT & PLAN NOTE
Assessment:  BP elevated in ED  BP has now stabilized      Plan    - patient will follow-up with PCP outpatient

## 2021-08-18 NOTE — PHYSICAL THERAPY NOTE
PHYSICAL THERAPY EVALUATION  NAME:  Kaila Yates  DATE: 08/18/21    AGE:   80 y o    Mrn:   [id number]  ADMIT DX:  Bradycardia [R00 1]  Blood in urine [R31 9]  Essential hypertension [I10]  Renal insufficiency [N28 9]  Gross hematuria [R31 0]  Suspected congestive heart failure [I80 00]  Chronic diastolic congestive heart failure (HCC) [I50 32]  Abnormal ultrasound of bladder [R93 41]  Ambulatory dysfunction [R26 2]  Problem List:   Patient Active Problem List   Diagnosis    Suspected congestive heart failure    Essential hypertension    Hypothyroidism    Stage 3 chronic kidney disease (HCC)    Chronic diastolic congestive heart failure (HCC)    Atrial fibrillation ([address] )    Ambulatory dysfunction    BPH (benign prostatic hyperplasia)    Abnormal ultrasound of bladder    Bradycardia    Gross hematuria         Length Of Stay: 4  Performed at least 2 patient identifiers during session: Name and Birthday  PHYSICAL THERAPY EVALUATION :    08/18/21 1350   PT Last Visit   PT Visit Date 08/18/21   Note Type   Note type Evaluation   Pain Assessment   Pain Assessment Tool Pain Assessment not indicated - pt denies pain   Home Living   Type of [address] [facility] [facility] One level;Ramped entrance   [address] [facility]   Additional Comments Patient daughter report that he recently got no electric scooter which she uses to go out to his work area [employer]"   Prior Function   Level of [place] Independent with ADLs and functional mobility   Lives With Alone   Receives Help From Family  (Daughter and son-in-law live next door, gets Meals on wheels)   ADL Assistance Independent   IADLs Needs assistance   Falls in the last 6 months 0   Vocational Retired  ([address])   Restrictions/Precautions   Other Precautions Fall Risk   General   Additional Pertinent History Notified by nursing that patient is tentative discharge, needs to be" cleared by PT" prior to discharge   Family/Caregiver Present Yes  (Daughter and son-in-law)   Cognition   Overall Cognitive Status WFL   Arousal/Participation Alert   Following Commands Follows one step commands with increased time or repetition  (With MMT)   RUE Strength   RUE Overall Strength Within Functional Limits - able to perform ADL tasks with strength   LUE Strength   LUE Overall Strength Within Functional Limits - able to perform ADL tasks with strength   RLE Assessment   RLE Assessment   (Some lower leg edema)   Strength RLE   R Hip Flexion 4+/5   R Knee Extension 4+/5   R Ankle Dorsiflexion 4+/5   Strength LLE   L Hip Flexion 4+/5   L Knee Extension 4+/5   L Ankle Dorsiflexion 4+/5   Coordination   Sensation   (Uses glasses for reading, slight [place])   Light Touch   RLE Light Touch Grossly intact   LLE Light Touch Grossly intact   Bed Mobility   Supine to Sit 6  Modified independent   Additional items Assist x 1;Bedrails; Increased time required   Transfers   Sit to Stand 6  Modified independent   Additional items Assist x 1; Increased time required   Stand to Sit 6  Modified independent   Additional items Increased time required;Armrests   Additional Comments Unable to perform 5 time sit to stand without UE support   Ambulation/Elevation   Gait pattern   (Increased walker advancement)   Gait Assistance 5  Supervision  (First trial without shoes, modified independent with shoes)   Additional items Assist x 1   Assistive Device Rolling walker   Distance 20'x2--reports not wanting to walk in the halls   Stair Management Assistance Not tested  (As patient has ramp access x2 at home)   Balance   Static Sitting Good   Static Standing [facility]  (With walker)   Higher level balance   (TUG w/ walker 22 58 seconds after practice attempt)   Endurance Deficit   Endurance Deficit Yes   Endurance Deficit Description Self reported fatigue, declined to not walk distances in halls   Activity Tolerance   Activity Tolerance Patient limited by fatigue;Patient tolerated treatment well Ervin [address] Odean Sandifer, spoke to Rodrigo tirado from case management   Nurse Made Aware Spoke to Fortino Angelucci RN   Assessment   Prognosis Good   Problem List Decreased endurance; Impaired balance;Decreased mobility; Impaired vision;Decreased skin integrity   Barriers to Discharge Decreased caregiver support  (However family lives next door, visits patient 3 to 4 times )   Goals   Patient Goals To go home and [place] in my ground   PT Treatment Day 0   Plan   Treatment/Interventions Spoke to MD;Spoke to nursing;Spoke to case management   PT Frequency Other (Comment)  (DC from IP PT)   Recommendation   PT Discharge Recommendation No rehabilitation needs   Equipment Recommended [employer] walker   Change/add to UClass? No  (Has 1)   AM-PAC Basic Mobility Inpatient   Turning in Bed Without Bedrails 3   Lying on Back to Sitting on Edge of Flat Bed 3   Moving Bed to Chair 4   Standing Up From Chair 4   Walk in Room 4   Climb 3-5 Stairs 3   Basic Mobility Inpatient Raw Score 21   Basic Mobility Standardized Score 45 55   (Please find full objective findings from PT assessment regarding body systems outlined above)  Assessment: Pt is a 80 y o  male seen for PT evaluation s/p admit to [facility] on 8/14/2021 w/ Gross hematuria  Order placed for PT  Prior to admission: Pt lived alone in a 1 floor setup with a ramp entrance, used rolling walker to ambulate  Daughter and son-in-law live nearby  Upon evaluation: Pt did not require any physical assistance for bed mobility, transfers, nor ambulation within the room    Patient was impulsive with 1st balance trial and required supervision when not wearing shoes, but was modified independent for 2nd gait trial   Pt's clinical presentation is currently unstable/unpredictable given the functional mobility deficits above, especially (but not limited to) decreased endurance and decreased functional mobility tolerance, coupled with fall risks including impaired balance, and combined with medical complications of bradycardia, abnormal renal lab values, abnormal H&H and Incidental finding of renal mass  However, patient does compensate with his deficits by using a rolling walker and sneakers, and does score better than fall risk score for the time up and go balance test for frail elderly  Family reports that they will provide assistance as needed upon discharge, especially if he is out in the environment and LEs he is using his electric scooter  No further IP PT indicated at this time  CUT OFF SCORES TUG:   Cut-Off Scores indicating risk of falls by population   Population Cut-Off score Author   Frail elderly > 32 6* Esteban et al, 2005   * Time in seconds   NightAgenda   [email or web address]  aspx? [email or web address]  Accessed 11/29/2016    Elective Total Joint Replacements: Research indicates that a TUG score of <30 seconds is indicative of patients going home, whereas a score of >30 seconds indicates that the patient will require additional services  Bam Ríos, Stefan Granado D , Deena Martínez , Rupali Solano , & Romayne Cross D  (2014)  A Survey of Physiotherapists Experience Using Outcome Measures in Total Hip and Knee Arthroplasty  [address], [id number]), 276-138  https://doi org/10 3138/ptc 2013-34              Co-morbidities affecting pt's physical performance at time of assessment include:  AFib,  CKD, CHF (congestive heart failure), Disease of thyroid gland, and Hypertension  Personal factors affecting pt at time of IE include: advanced age, inability to perform IADLs and inability to navigate community distances  The patient's AM-PAC Basic Mobility Inpatient Short Form Raw Score is 21, Standardized Score is 45 55  A standardized score greater than 42 9 suggests the patient may benefit from discharge to home, which DOES coincide with above PT recommendations   However please refer to therapist recommendation for discharge planning given other factors that may influence destination  Adapted from Amarjit Chapman Use of 6-clicks to Provide Decision Support in the [facility] [place] Setting  [address], [address]  [facility]  Portions of the record may have been created with voice recognition software  Occasional wrong word or "sound a like" substitutions may have occurred due to the inherent limitations of voice recognition software    Read the chart carefully and recognize, using context, where substitutions have occurred

## 2021-08-18 NOTE — ASSESSMENT & PLAN NOTE
Wt Readings from Last 3 Encounters:   08/14/21 74 4 kg (164 lb)   01/16/21 76 2 kg (168 lb)   01/23/17 97 7 kg (215 lb 6 2 oz)     Assessment:  Patient appears at baseline  Has some leg swelling, but this is normal per family  No respiratory complaints      Plan:  Monitor and follow outpatient with PCP's recommendations

## 2021-08-18 NOTE — ASSESSMENT & PLAN NOTE
Lab Results   Component Value Date    EGFR 45 08/18/2021    EGFR 43 08/17/2021    EGFR 47 08/16/2021    CREATININE 1 35 (H) 08/18/2021    CREATININE 1 40 (H) 08/17/2021    CREATININE 1 32 (H) 08/16/2021     Assessment:  Creatinine appears at baseline/slightly above baseline and with a baseline eGFR and Cr at 22-27, and 2 09-2 46 respectively this is evidence of continuing CKD      Plan:  - Held Lasix, Lisinopril inpatient, will follow-up with PCP asap

## 2021-08-18 NOTE — DISCHARGE INSTR - AVS FIRST PAGE
Dear Vicky French,     It was our pleasure to care for you here at [facility]  It is our hope that we were always able to exceed the expected standards for your care during your stay  You were hospitalized due to hematuria  You were cared for on the 4th floor by Eduardo Alfredo MD with the [facility] who covers for your primary care physician (PCP), Jr Roy MD, while you were hospitalized  If you have any questions or concerns related to this hospitalization, you may contact us at [phone]  For follow up as well as any medication refills, we recommend that you follow up with your primary care physician  A registered nurse will reach out to you by phone within a few days after your discharge to answer any additional questions that you may have after going home  However, at this time we provide for you here, the most important instructions / recommendations at discharge:     · Notable Medication Adjustments -   · Hold lisinopril  · Restart Eliquis 2 5 mg twice daily on Sunday  · Restart Lasix 40 mg once daily on Sunday  · Testing Required after Discharge -   · BMP next week  · Important follow up information -   · Follow-up with cardiology  · Follow-up with Urology  · Follow-up with PCP    · Please review this entire after visit summary as additional general instructions including medication list, appointments, activity, diet, any pertinent wound care, and other additional recommendations from your care team that may be provided for you        Sincerely,     Dung Coppola MD

## 2021-08-23 PROCEDURE — 52000 CYSTOURETHROSCOPY: CPT | Performed by: UROLOGY

## 2021-08-23 NOTE — PROGRESS NOTES
Assessment/Plan:    Gross hematuria  Patient with recurring gross hematuria associated with use of Eliquis and findings of a very enlarged prostate gland on cystoscopy today which correlates with CT imaging  No evidence of overt malignancy in the bladder or urethra  I strongly suspect his bleeding is from high-pressure voiding and obstructing prostate  I had a long conversation with his daughter and son-in-law regarding options if this were to recur which include holding Eliquis and considering a surgical treatment  He is already on appropriate medical therapy with Flomax and finasteride  Surgical options are not ideal considering his age comorbidities and very large prostate gland  I do not think he would tolerate a simple prostatectomy which would be the treatment of choice for gland as large as his but we could consider a TURP knowing that he would likely only treat his his median lobe and a small percentage of his overall adenoma but perhaps enough to reduce risk for recurrence  BPH (benign prostatic hyperplasia)  Please see plan regarding gross hematuria but in short patient will stay on Flomax and finasteride and we will consider an outlet procedure only if he has recurring hematuria and/or inability to void but there is not an ideal procedure given his very large gland size and surgical risks  Subjective:      Patient ID: Socorro Curry is a 80 y o  male  HPI  Socorro Curry is a 80-year-old male seen in hospital for consultation for gross hematuria, BPH and finding on CT of possible bladder mass after being admitted for hematuria in the setting of eliquis August 2021  Patient was on CBI during hospitalization and urine improved  Garcia catheter removed during stay  Ucx was negative  CT non-con (patient has stage IIIb-IV CKD)  showed a lobulated area measuring 3 x 3 cm on the right side of the bladder and a very large prostate measuring 288 cc    There is also mention of a possible sigmoid soft tissue mass  The patient reports he has been voiding adequately since catheter was removed  He denies any recurrent hematuria since resuming his Eliquis 3 days ago  Cystoscopy today showed a very large prostate with large median lobe but did not show malignancy  Original PVR after cystoscopy was 475 cc but with a 2nd void went down to 15 cc  Review of Systems   Constitutional: Negative for chills and fever  HENT: Negative for ear pain and sore throat  Eyes: Negative for pain and visual disturbance  Respiratory: Negative for cough and shortness of breath  Cardiovascular: Negative for chest pain and palpitations  Gastrointestinal: Negative for abdominal pain and vomiting  Genitourinary: Negative for dysuria and hematuria  Skin: Negative for color change and rash  Neurological: Negative for seizures and syncope  All other systems reviewed and are negative  Objective:      /78   Ht 5' 5" (1 651 m)   BMI 27 29 kg/m²          Physical Exam  Vitals reviewed  Constitutional:       Appearance: Normal appearance  He is normal weight  HENT:      Head: Normocephalic and atraumatic  Eyes:      Pupils: Pupils are equal, round, and reactive to light  Abdominal:      General: Abdomen is flat  Neurological:      Mental Status: He is alert  Cystoscopy     Date/Time 8/23/2021 10:33 AM     Performed by  Cedric Tomlinson MD     Authorized by Cedric Tomlinson MD      Universal Protocol:  Consent: Written consent obtained  Risks and benefits: risks, benefits and alternatives were discussed  Consent given by: patient  Time out: Immediately prior to procedure a "time out" was called to verify the correct patient, procedure, equipment, support staff and site/side marked as required    Patient understanding: patient states understanding of the procedure being performed  Patient consent: the patient's understanding of the procedure matches consent given  Procedure consent: procedure consent matches procedure scheduled  Patient identity confirmed: verbally with patient        Procedure Details:  Procedure type: cystoscopy    Patient tolerance: Patient tolerated the procedure well with no immediate complications    Additional Procedure Details: A time-out was performed identifying the correct patient site and procedure  A MA chaperone was in the room  A flexible cystoscope was introduced into the urethra  The pendulous urethra was normal   The prostatic urethra was very long at approximately 6 cm with bilateral lobar hypertrophy and a large median lobe  The bladder was distended with trabeculations and small diverticula  There were multiple areas of erythema that appeared more consistent with recent Garcia irritation rather than malignancy  I did not see a lesion in the bladder that  correlated with his CT scan finding of a 3 cm mass  did not have any lesions concerning for malignancy  The ureteral orifices could not be visualized secondary to his large median lobe but appear to be in orthotopic position  Bladder scan performed after procedure was completed with a 275 cc voided with a  cc    Patient was able to void again with PVR of 15 cc

## 2021-08-25 ENCOUNTER — PROCEDURE VISIT (OUTPATIENT)
Dept: UROLOGY | Facility: CLINIC | Age: 86
End: 2021-08-25
Payer: MEDICARE

## 2021-08-25 VITALS — DIASTOLIC BLOOD PRESSURE: 78 MMHG | BODY MASS INDEX: 27.29 KG/M2 | SYSTOLIC BLOOD PRESSURE: 120 MMHG | HEIGHT: 65 IN

## 2021-08-25 DIAGNOSIS — N18.32 STAGE 3B CHRONIC KIDNEY DISEASE (HCC): Chronic | ICD-10-CM

## 2021-08-25 DIAGNOSIS — R31.0 GROSS HEMATURIA: Primary | ICD-10-CM

## 2021-08-25 DIAGNOSIS — N40.0 BENIGN PROSTATIC HYPERPLASIA, UNSPECIFIED WHETHER LOWER URINARY TRACT SYMPTOMS PRESENT: ICD-10-CM

## 2021-08-25 PROCEDURE — 99213 OFFICE O/P EST LOW 20 MIN: CPT | Performed by: UROLOGY

## 2021-08-25 RX ORDER — LISINOPRIL 2.5 MG/1
TABLET ORAL
COMMUNITY

## 2021-08-25 NOTE — ASSESSMENT & PLAN NOTE
Patient with recurring gross hematuria associated with use of Eliquis and findings of a very enlarged prostate gland on cystoscopy today which correlates with CT imaging  No evidence of overt malignancy in the bladder or urethra  I strongly suspect his bleeding is from high-pressure voiding and obstructing prostate  I had a long conversation with his daughter and son-in-law regarding options if this were to recur which include holding Eliquis and considering a surgical treatment  He is already on appropriate medical therapy with Flomax and finasteride  Surgical options are not ideal considering his age comorbidities and very large prostate gland  I do not think he would tolerate a simple prostatectomy which would be the treatment of choice for gland as large as his but we could consider a TURP knowing that he would likely only treat his his median lobe and a small percentage of his overall adenoma but perhaps enough to reduce risk for recurrence

## 2021-08-25 NOTE — ASSESSMENT & PLAN NOTE
Please see plan regarding gross hematuria but in short patient will stay on Flomax and finasteride and we will consider an outlet procedure only if he has recurring hematuria and/or inability to void but there is not an ideal procedure given his very large gland size and surgical risks

## 2021-08-26 NOTE — QUICK NOTE
This patient's gross hematuria was felt to possibly due to Eliquis  He was consulted with urology and it was agreed to withhold Eliquis until the Sunday after his discharge in case re-bleeding were to occur      Aman García MD PGY-2 FM

## 2021-10-26 ENCOUNTER — TELEPHONE (OUTPATIENT)
Dept: UROLOGY | Facility: MEDICAL CENTER | Age: 86
End: 2021-10-26

## 2021-10-28 ENCOUNTER — OFFICE VISIT (OUTPATIENT)
Dept: UROLOGY | Facility: CLINIC | Age: 86
End: 2021-10-28
Payer: MEDICARE

## 2021-10-28 VITALS
BODY MASS INDEX: 27.16 KG/M2 | SYSTOLIC BLOOD PRESSURE: 138 MMHG | DIASTOLIC BLOOD PRESSURE: 76 MMHG | HEIGHT: 65 IN | WEIGHT: 163 LBS | HEART RATE: 76 BPM

## 2021-10-28 DIAGNOSIS — N40.0 BENIGN PROSTATIC HYPERPLASIA, UNSPECIFIED WHETHER LOWER URINARY TRACT SYMPTOMS PRESENT: Primary | ICD-10-CM

## 2021-10-28 LAB — POST-VOID RESIDUAL VOLUME, ML POC: 168 ML

## 2021-10-28 PROCEDURE — 51798 US URINE CAPACITY MEASURE: CPT | Performed by: PHYSICIAN ASSISTANT

## 2021-10-28 PROCEDURE — 1123F ACP DISCUSS/DSCN MKR DOCD: CPT | Performed by: PHYSICIAN ASSISTANT

## 2021-10-28 PROCEDURE — 99213 OFFICE O/P EST LOW 20 MIN: CPT | Performed by: PHYSICIAN ASSISTANT

## 2021-11-10 ENCOUNTER — IMMUNIZATIONS (OUTPATIENT)
Dept: FAMILY MEDICINE CLINIC | Facility: HOSPITAL | Age: 86
End: 2021-11-10

## 2021-11-10 DIAGNOSIS — Z23 ENCOUNTER FOR IMMUNIZATION: Primary | ICD-10-CM

## 2021-11-10 PROCEDURE — 91306 COVID-19 MODERNA VACC 0.25 ML BOOSTER: CPT

## 2021-11-10 PROCEDURE — 0013A COVID-19 MODERNA VACC 0.25 ML BOOSTER: CPT

## 2022-07-18 ENCOUNTER — APPOINTMENT (OUTPATIENT)
Dept: LAB | Facility: CLINIC | Age: 87
End: 2022-07-18
Payer: MEDICARE

## 2022-07-18 ENCOUNTER — TELEPHONE (OUTPATIENT)
Dept: OTHER | Facility: OTHER | Age: 87
End: 2022-07-18

## 2022-07-18 DIAGNOSIS — R39.9 UTI SYMPTOMS: Primary | ICD-10-CM

## 2022-07-18 DIAGNOSIS — R39.9 UTI SYMPTOMS: ICD-10-CM

## 2022-07-18 LAB
BACTERIA UR QL AUTO: ABNORMAL /HPF
BILIRUB UR QL STRIP: NEGATIVE
CLARITY UR: CLEAR
COLOR UR: COLORLESS
GLUCOSE UR STRIP-MCNC: NEGATIVE MG/DL
HGB UR QL STRIP.AUTO: ABNORMAL
HYALINE CASTS #/AREA URNS LPF: ABNORMAL /LPF
KETONES UR STRIP-MCNC: NEGATIVE MG/DL
LEUKOCYTE ESTERASE UR QL STRIP: NEGATIVE
NITRITE UR QL STRIP: NEGATIVE
NON-SQ EPI CELLS URNS QL MICRO: ABNORMAL /HPF
PH UR STRIP.AUTO: 7 [PH]
PROT UR STRIP-MCNC: NEGATIVE MG/DL
RBC #/AREA URNS AUTO: ABNORMAL /HPF
SP GR UR STRIP.AUTO: 1.01 (ref 1–1.03)
UROBILINOGEN UR STRIP-ACNC: <2 MG/DL
WBC #/AREA URNS AUTO: ABNORMAL /HPF

## 2022-07-18 PROCEDURE — 81001 URINALYSIS AUTO W/SCOPE: CPT

## 2022-07-18 PROCEDURE — 87086 URINE CULTURE/COLONY COUNT: CPT

## 2022-07-18 NOTE — TELEPHONE ENCOUNTER
Pt  Called his daughter and he has burning when he urinates and blood  (His daughter wanted me to mention that he is on eliquis)  Pls  Return the call to his daughter so she can possibly bring a urine sample to the lab (his mobility is not that great) and get medication for his UTI

## 2022-07-18 NOTE — TELEPHONE ENCOUNTER
Spoke with patients daughter  Daughter will collect sample from patient in sterile cup and bring to lab  Explained UA takes several hours and UC can take up to 72 hours  She verbalized understanding      Clinical to monitor for results

## 2022-07-19 ENCOUNTER — NURSE TRIAGE (OUTPATIENT)
Dept: OTHER | Facility: OTHER | Age: 87
End: 2022-07-19

## 2022-07-19 LAB — BACTERIA UR CULT: NORMAL

## 2022-07-19 NOTE — TELEPHONE ENCOUNTER
Regarding: Results of Urinalysis   ----- Message from Nezasa Bartolo sent at 7/19/2022  9:30 AM EDT -----  "My dad had a urinalysis done yesterday and I was wondering what the results are"    Evangelist Mejía RN 20 minutes ago (11:13 AM)          Patient's daughter, Mello Nevarez called in for lab results; urinalysis completed; urine culture is not  Daughter reports patient denies any blood in the urine or burning today  Please follow up with daughter      Reason for Disposition   Nursing judgment    Answer Assessment - Initial Assessment Questions  1  REASON FOR CALL or QUESTION: "What is your reason for calling today?" or "How can I best help you?" or "What question do you have that I can help answer?"      I would like to know the results of my father's urine tests from 7/18/22      Protocols used: INFORMATION ONLY CALL - NO TRIAGE-ADULT-[place]

## 2022-07-19 NOTE — TELEPHONE ENCOUNTER
Patient's daughter, Danelle Eng called in for lab results; urinalysis completed; urine culture is not  Daughter reports patient denies any blood in the urine or burning today  Please follow up with daughter  Reason for Disposition   Nursing judgment    Answer Assessment - Initial Assessment Questions  1  REASON FOR CALL or QUESTION: "What is your reason for calling today?" or "How can I best help you?" or "What question do you have that I can help answer?"      I would like to know the results of my father's urine tests from 7/18/22      Protocols used: INFORMATION ONLY CALL - NO TRIAGE-ADULT-[place]

## 2022-07-19 NOTE — TELEPHONE ENCOUNTER
Called and spoke with patient's daughter  Reviewed UA results, which are mostly normal except blood which was expected  Advised we will still wait for final urine culture results but it is unlikely he has an infection  She states urine has completely cleared and he no longer has burning  Reviewed he did have hematuria workup last summer  She states she just wanted to ensure no infection and realizes this may happen from time to time due to eliquis  Advised we would call once final urine culture results are back, but no further recommendations at this time  She verbalized understanding

## 2022-07-19 NOTE — TELEPHONE ENCOUNTER
Regarding: Results of Urinalysis   ----- Message from Gunjan Clifford sent at 7/19/2022  9:30 AM EDT -----  "My dad had a urinalysis done yesterday and I was wondering what the results are"

## 2022-07-20 NOTE — TELEPHONE ENCOUNTER
Relayed to [address] [place] [address] that UC is clear  Advised she could call back anytime with any concerns   Verbalized understanding

## 2023-02-14 NOTE — ASSESSMENT & PLAN NOTE
Assessment:  Present on admission    Plan:  - Not on rate control interventions as HR is 49-64  - Hold Eliquis Case Management Discharge Planning Note    Patient name Elizabet Bai  Location [address] [place] [address] [address] [id number] MRN [id number]  : 1936 Date 2023       Current Admission Date: 2023  Current Admission Diagnosis:Fall   Patient Active Problem List    Diagnosis Date Noted   • Acute pain due to trauma 2023   • Fall 2023   • Closed T12 fracture ([address] ) 2023   • Right hip pain 2023   • Class 1 obesity in adult 2023   • H/O heart artery stent    • MS (mitral stenosis)    • Right ureteral stone 2023   • Acute cystitis with hematuria 2023   • Urinary tract obstruction by kidney stone 2023   • Atrial flutter ([address] ) 2023   • Pacemaker 2022   • Hypertension    • Hyperlipemia    • Aortic stenosis    • S/P AVR    • Carotid artery disease ([address] )    • Coronary artery disease       LOS (days): 13  Geometric Mean LOS (GMLOS) (days): 2 80  Days to GMLOS:-10 1     OBJECTIVE:  Risk of Unplanned Readmission Score: 17 4         Current admission status: Inpatient   Preferred Pharmacy:   [facility] [address] [place] [address], [address] [place] [id number]  Phone: [phone] Fax: [phone]    [id number] [place], [place] - [address]  [address]  [facility] [address]  Phone: [phone] Fax: [phone]    Primary Care Provider: Jesse Escobar DO    Primary Insurance: [facility] REP  Secondary Insurance:     DISCHARGE DETAILS:    Transport at Discharge : [facility] Ambulance  Dispatcher Contacted: Yes  Number/Name of Dispatcher: MYKE     ETA of Transport (Date): 23  ETA of Transport (Time): 1400     Transfer Mode: Stretcher        IMM Given (Date):: 23  IMM Given to[de-identified] Patient  Family notified[de-identified] son       Accepting Facility Name, [address] : [facility]  Receiving Facility/Agency Phone Number: [phone]  Facility/Agency Fax Number: [phone]      Pt will d/c to [facility] today   CM submitted for BLS transport today after 1400, will await definitive time  Pt and son aware

## 2023-05-03 ENCOUNTER — LAB REQUISITION (OUTPATIENT)
Dept: LAB | Facility: HOSPITAL | Age: 88
End: 2023-05-03

## 2023-05-03 DIAGNOSIS — D48.5 NEOPLASM OF UNCERTAIN BEHAVIOR OF SKIN: ICD-10-CM

## 2023-05-11 ENCOUNTER — HOSPITAL ENCOUNTER (EMERGENCY)
Facility: HOSPITAL | Age: 88
Discharge: HOME | End: 2023-05-11
Attending: EMERGENCY MEDICINE

## 2023-05-11 VITALS
OXYGEN SATURATION: 96 % | SYSTOLIC BLOOD PRESSURE: 137 MMHG | TEMPERATURE: 97.7 F | DIASTOLIC BLOOD PRESSURE: 54 MMHG | HEART RATE: 65 BPM | RESPIRATION RATE: 17 BRPM

## 2023-05-11 DIAGNOSIS — T14.8XXA BLEEDING FROM WOUND: Primary | ICD-10-CM

## 2023-05-11 LAB
ANION GAP SERPL CALCULATED.3IONS-SCNC: 7 MMOL/L (ref 4–13)
BASOPHILS # BLD AUTO: 0.07 THOUSANDS/ÂΜL (ref 0–0.1)
BASOPHILS NFR BLD AUTO: 1 % (ref 0–1)
BUN SERPL-MCNC: 42 MG/DL (ref 5–25)
CALCIUM SERPL-MCNC: 8.4 MG/DL (ref 8.4–10.2)
CHLORIDE SERPL-SCNC: 99 MMOL/L (ref 96–108)
CO2 SERPL-SCNC: 25 MMOL/L (ref 21–32)
CREAT SERPL-MCNC: 1.52 MG/DL (ref 0.6–1.3)
EOSINOPHIL # BLD AUTO: 0.14 THOUSAND/ÂΜL (ref 0–0.61)
EOSINOPHIL NFR BLD AUTO: 2 % (ref 0–6)
ERYTHROCYTE [DISTWIDTH] IN BLOOD BY AUTOMATED COUNT: 17.2 % (ref 11.6–15.1)
GFR SERPL CREATININE-BSD FRML MDRD: 38 ML/MIN/1.73SQ M
GLUCOSE SERPL-MCNC: 114 MG/DL (ref 65–140)
HCT VFR BLD AUTO: 34.6 % (ref 36.5–49.3)
HGB BLD-MCNC: 11.4 G/DL (ref 12–17)
IMM GRANULOCYTES # BLD AUTO: 0.13 THOUSAND/UL (ref 0–0.2)
IMM GRANULOCYTES NFR BLD AUTO: 2 % (ref 0–2)
INR PPP: 1.14 (ref 0.84–1.19)
LYMPHOCYTES # BLD AUTO: 2.04 THOUSANDS/ÂΜL (ref 0.6–4.47)
LYMPHOCYTES NFR BLD AUTO: 24 % (ref 14–44)
MCH RBC QN AUTO: 31.1 PG (ref 26.8–34.3)
MCHC RBC AUTO-ENTMCNC: 32.9 G/DL (ref 31.4–37.4)
MCV RBC AUTO: 95 FL (ref 82–98)
MONOCYTES # BLD AUTO: 1.1 THOUSAND/ÂΜL (ref 0.17–1.22)
MONOCYTES NFR BLD AUTO: 13 % (ref 4–12)
NEUTROPHILS # BLD AUTO: 5.2 THOUSANDS/ÂΜL (ref 1.85–7.62)
NEUTS SEG NFR BLD AUTO: 58 % (ref 43–75)
NRBC BLD AUTO-RTO: 0 /100 WBCS
PLATELET # BLD AUTO: 344 THOUSANDS/UL (ref 149–390)
PMV BLD AUTO: 9.3 FL (ref 8.9–12.7)
POTASSIUM SERPL-SCNC: 5 MMOL/L (ref 3.5–5.3)
PROTHROMBIN TIME: 14.9 SECONDS (ref 11.6–14.5)
RBC # BLD AUTO: 3.66 MILLION/UL (ref 3.88–5.62)
SODIUM SERPL-SCNC: 131 MMOL/L (ref 135–147)
WBC # BLD AUTO: 8.68 THOUSAND/UL (ref 4.31–10.16)

## 2023-05-11 RX ORDER — LIDOCAINE HYDROCHLORIDE AND EPINEPHRINE 10; 10 MG/ML; UG/ML
10 INJECTION, SOLUTION INFILTRATION; PERINEURAL ONCE
Status: COMPLETED | OUTPATIENT
Start: 2023-05-11 | End: 2023-05-11

## 2023-05-11 RX ADMIN — LIDOCAINE HYDROCHLORIDE,EPINEPHRINE BITARTRATE 10 ML: 10; .01 INJECTION, SOLUTION INFILTRATION; PERINEURAL at 22:18

## 2023-05-12 NOTE — DISCHARGE INSTRUCTIONS
Skip taking Eliquis tomorrow  Take rest of meds as instructed  Follow up with your surgeon in 3 days  Follow up with your PMD    Labs Reviewed   CBC AND DIFFERENTIAL - Abnormal       Result Value Ref Range Status    WBC 8 68  4 31 - 10 16 Thousand/uL Final    RBC 3 66 (*) 3 88 - 5 62 Million/uL Final    Hemoglobin 11 4 (*) 12 0 - 17 0 g/dL Final    Hematocrit 34 6 (*) 36 5 - 49 3 % Final    MCV 95  82 - 98 fL Final    MCH 31 1  26 8 - 34 3 pg Final    MCHC 32 9  31 4 - 37 4 g/dL Final    RDW 17 2 (*) 11 6 - 15 1 % Final    MPV 9 3  8 9 - 12 7 fL Final    Platelets 500  939 - 390 Thousands/uL Final    nRBC 0  /100 WBCs Final    Neutrophils Relative 58  43 - 75 % Final    Immat GRANS % 2  0 - 2 % Final    Lymphocytes Relative 24  14 - 44 % Final    Monocytes Relative 13 (*) 4 - 12 % Final    Eosinophils Relative 2  0 - 6 % Final    Basophils Relative 1  0 - 1 % Final    Neutrophils Absolute 5 20  1 85 - 7 62 Thousands/µL Final    Immature Grans Absolute 0 13  0 00 - 0 20 Thousand/uL Final    Lymphocytes Absolute 2 04  0 60 - 4 47 Thousands/µL Final    Monocytes Absolute 1 10  0 17 - 1 22 Thousand/µL Final    Eosinophils Absolute 0 14  0 00 - 0 61 Thousand/µL Final    Basophils Absolute 0 07  0 00 - 0 10 Thousands/µL Final   PROTIME-INR - Abnormal    Protime 14 9 (*) 11 6 - 14 5 seconds Final    INR 1 14  0 84 - 1 19 Final   BASIC METABOLIC PANEL - Abnormal    Sodium 131 (*) 135 - 147 mmol/L Final    Potassium 5 0  3 5 - 5 3 mmol/L Final    Chloride 99  96 - 108 mmol/L Final    CO2 25  21 - 32 mmol/L Final    ANION GAP 7  4 - 13 mmol/L Final    BUN 42 (*) 5 - 25 mg/dL Final    Creatinine 1 52 (*) 0 60 - 1 30 mg/dL Final    Comment: Standardized to IDMS reference method    Glucose 114  65 - 140 mg/dL Final    Comment: If the patient is fasting, the ADA then defines impaired fasting glucose as > 100 mg/dL and diabetes as > or equal to 123 mg/dL      Calcium 8 4  8 4 - 10 2 mg/dL Final    eGFR 38  ml/min/1 73sq m Final Narrative:     National Kidney Disease Foundation guidelines for Chronic Kidney Disease (CKD):     Stage 1 with normal or high GFR (GFR > 90 mL/min/1 73 square meters)    Stage 2 Mild CKD (GFR = 60-89 mL/min/1 73 square meters)    Stage 3A Moderate CKD (GFR = 45-59 mL/min/1 73 square meters)    Stage 3B Moderate CKD (GFR = 30-44 mL/min/1 73 square meters)    Stage 4 Severe CKD (GFR = 15-29 mL/min/1 73 square meters)    Stage 5 End Stage CKD (GFR <15 mL/min/1 73 square meters)  Note: GFR calculation is accurate only with a steady state creatinine

## 2023-05-12 NOTE — ED PROVIDER NOTES
History  Chief Complaint   Patient presents with   • Medical Problem     Pt arrived via ems  Pt had lesions removed today and had been having bleeding that started about hour ago that has not stopped  Pt states stopping eliquis 2 days ago  Pt denies any other symptoms, denies chest pain,sob,dizziness  This is a 80years old came for having extensive bleeding from a wound at the dorsum of the right forearm  Patient has growth (mass)  which was removed today, and the stitches applied at the site of the wound  Patient sent home and tonight he started to find that he has bleeding at that site  Call 911 who changed his dressing from home until came to the ER 3 times as he has a lot of bleeding  Patient is on Eliquis and he stopped for 2 days before the surgery  Patient has 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 Taking? FINASTERIDE PO   Yes No   Sig: Take 5 mg by mouth daily   apixaban (ELIQUIS) 2 5 mg   No No   Sig: Take 1 tablet (2 5 mg total) by mouth 2 (two) times a day   furosemide (Lasix) 40 mg tablet   No No   Sig: Take 1 tablet (40 mg total) by mouth daily   levothyroxine 50 mcg tablet   Yes No   Sig: Take 50 mcg by mouth daily   lisinopril (ZESTRIL) 2 5 mg tablet   Yes No   tamsulosin (FLOMAX) 0 4 mg   No No   Sig: Take 1 capsule (0 4 mg total) by mouth daily with dinner      Facility-Administered Medications: None       Past Medical History:   Diagnosis Date   • CHF (congestive heart failure) (HCC)    • Disease of thyroid gland    • Hypertension        Past Surgical History:   Procedure Laterality Date   • SKIN BIOPSY         No family history on file  I have reviewed and agree with the history as documented      E-Cigarette/Vaping   • E-Cigarette Use Never User      E-Cigarette/Vaping Substances     Social History     Tobacco Use   • Smoking status: Former   • Smokeless tobacco: Never   Vaping Use   • Vaping Use: Never used   Substance Use Topics   • Alcohol use: No   • Drug use: No       Review of Systems   Constitutional: Negative for diaphoresis, fatigue and fever  Respiratory: Negative for cough, chest tightness and shortness of breath  Cardiovascular: Negative for chest pain, palpitations and leg swelling  Gastrointestinal: Negative for abdominal pain, diarrhea, nausea and vomiting  Genitourinary: Negative for difficulty urinating, dysuria, flank pain and hematuria  Musculoskeletal: Negative for arthralgias, back pain, gait problem, joint swelling, myalgias, neck pain and neck stiffness  Skin: Positive for wound  Negative for color change, pallor and rash  Neurological: Negative for dizziness, light-headedness and headaches  Hematological: Negative for adenopathy  Does not bruise/bleed easily  Psychiatric/Behavioral: Negative for agitation and behavioral problems  Physical Exam  Physical Exam  Vitals and nursing note reviewed  Constitutional:       General: He is not in acute distress  Appearance: Normal appearance  He is well-developed  He is not ill-appearing, toxic-appearing or diaphoretic  HENT:      Head: Normocephalic and atraumatic  Right Ear: Ear canal normal       Left Ear: Ear canal normal       Nose: Nose normal  No congestion or rhinorrhea  Mouth/Throat:      Mouth: Mucous membranes are moist       Pharynx: No oropharyngeal exudate or posterior oropharyngeal erythema  Eyes:      Extraocular Movements: Extraocular movements intact  Pupils: Pupils are equal, round, and reactive to light  Cardiovascular:      Rate and Rhythm: Normal rate and regular rhythm  Heart sounds: Normal heart sounds  No murmur heard  No friction rub     Pulmonary:      Effort: Pulmonary effort is normal  No respiratory distress  Breath sounds: Normal breath sounds  No wheezing  Chest:      Chest wall: No tenderness  Abdominal:      General: Bowel sounds are normal  There is no distension  Palpations: Abdomen is soft  There is no mass  Tenderness: There is no abdominal tenderness  There is no right CVA tenderness, left CVA tenderness or guarding  Hernia: No hernia is present  Musculoskeletal:         General: No tenderness or deformity  Normal range of motion  Cervical back: Normal range of motion and neck supple  Skin:     General: Skin is warm and dry  Capillary Refill: Capillary refill takes less than 2 seconds  Coloration: Skin is not jaundiced or pale  Findings: No bruising, erythema, lesion or rash  Comments: At the dorsum of the right forearm there is wound which is about 4 cm,  the ulnar side of it is keeping bleeding  There is a small gap between the edges of the skin  The bleeding is nonpulsating  The wound is clean  When we remove the dressing there is large blood clots covering the wound and there is large bulky dressing applied  I will apply stitches to close the gap and to stop the bleeding  Neurological:      Mental Status: He is alert and oriented to person, place, and time     Psychiatric:         Behavior: Behavior normal          Vital Signs  ED Triage Vitals [05/11/23 2222]   Temperature Pulse Respirations Blood Pressure SpO2   99 1 °F (37 3 °C) (!) 120 16 134/51 96 %      Temp Source Heart Rate Source Patient Position - Orthostatic VS BP Location FiO2 (%)   Oral Monitor Sitting Left arm --      Pain Score       --           Vitals:    05/11/23 2222 05/11/23 2324   BP: 134/51 137/54   Pulse: (!) 120 65   Patient Position - Orthostatic VS: Sitting Lying         Visual Acuity      ED Medications  Medications   lidocaine-epinephrine (XYLOCAINE/EPINEPHRINE) 1 %-1:100,000 injection 10 mL (10 mL Infiltration Given 5/11/23 2218)       Diagnostic Studies  Results Reviewed     Procedure Component Value Units Date/Time    Basic metabolic panel [[id number]]  (Abnormal) Collected: 05/11/23 2251    Lab Status: Final result Specimen: Blood from Arm, Left Updated: 05/11/23 2312     Sodium 131 mmol/L      Potassium 5 0 mmol/L      Chloride 99 mmol/L      CO2 25 mmol/L      ANION GAP 7 mmol/L      BUN 42 mg/dL      Creatinine 1 52 mg/dL      Glucose 114 mg/dL      Calcium 8 4 mg/dL      eGFR 38 ml/min/1 73sq m     Narrative:      [place] guidelines for Chronic Kidney Disease (CKD):   •  Stage 1 with normal or high GFR (GFR > 90 mL/min/1 73 square meters)  •  Stage 2 Mild CKD (GFR = 60-89 mL/min/1 73 square meters)  •  Stage 3A Moderate CKD (GFR = 45-59 mL/min/1 73 square meters)  •  Stage 3B Moderate CKD (GFR = 30-44 mL/min/1 73 square meters)  •  Stage 4 Severe CKD (GFR = 15-29 mL/min/1 73 square meters)  •  Stage 5 End Stage CKD (GFR <15 mL/min/1 73 square meters)  Note: GFR calculation is accurate only with a steady state creatinine    Protime-INR [[id number]]  (Abnormal) Collected: 05/11/23 2251    Lab Status: Final result Specimen: Blood from Arm, Left Updated: 05/11/23 2311     Protime 14 9 seconds      INR 1 14    CBC and differential [[id number]]  (Abnormal) Collected: 05/11/23 2251    Lab Status: Final result Specimen: Blood from Arm, Left Updated: 05/11/23 2255     WBC 8 68 Thousand/uL      RBC 3 66 Million/uL      Hemoglobin 11 4 g/dL      Hematocrit 34 6 %      MCV 95 fL      MCH 31 1 pg      MCHC 32 9 g/dL      RDW 17 2 %      MPV 9 3 fL      Platelets 703 Thousands/uL      nRBC 0 /100 WBCs      Neutrophils Relative 58 %      Immat GRANS % 2 %      Lymphocytes Relative 24 %      Monocytes Relative 13 %      Eosinophils Relative 2 %      Basophils Relative 1 %      Neutrophils Absolute 5 20 Thousands/µL      Immature Grans Absolute 0 13 Thousand/uL      Lymphocytes Absolute 2 04 Thousands/µL      Monocytes Absolute 1 10 Thousand/µL Eosinophils Absolute 0 14 Thousand/µL      Basophils Absolute 0 07 Thousands/µL                  No orders to display              Procedures  Laceration repair    Date/Time: 5/11/2023 11:10 PM  Performed by: Phan Hardin MD  Authorized by: Phan Hardin MD   Consent: Verbal consent obtained  Consent given by: patient  Patient understanding: patient states understanding of the procedure being performed  Patient consent: the patient's understanding of the procedure matches consent given  Procedure consent: procedure consent matches procedure scheduled  Relevant documents: relevant documents present and verified  Site marked: the operative site was marked  Radiology Images displayed and confirmed  If images not available, report reviewed: imaging studies available  Patient identity confirmed: verbally with patient, arm band, provided demographic data, hospital-assigned identification number and anonymous protocol, patient vented/unresponsive  Tendon involvement: none  Nerve involvement: none  Vascular damage: no  Anesthesia: local infiltration    Anesthesia:  Local Anesthetic: lidocaine 1% with epinephrine    Sedation:  Patient sedated: no      Wound Dehiscence:  Superficial Wound Dehiscence: simple closure      Procedure Details:  Irrigation solution: saline  Irrigation method: syringe  Amount of cleaning: standard  Debridement: none  Degree of undermining: none  Skin closure: Ethilon (ETHILON 5-0)  Number of sutures: 2  Technique: simple  Approximation: close  Approximation difficulty: simple  Dressing: 4x4 sterile gauze  Comments: 2 stitches applied and bleeding stopped               ED Course                                             Medical Decision Making  This is a 80years old came for having bleeding at the site of the wound  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 which was excised with today  At the ulnar side of the wound there is a small gap which keep oozing blood    I have applied 2 stitches which stopped the bleeding  Patient observed in the ER for 1 hour during which we did blood work  No more bleeding  CBC, BMP came back normal   Instruction to the patient just to skip the Eliquis tomorrow and to start on it on Saturday  All question concerns of the patient and his son and wife answered  Instructed to change dressing in 2 days from now  Amount and/or Complexity of Data Reviewed  Labs: ordered  Details: H/H 11 4/34 6       bun/cr 42/1 4  Na 131   inr 1 4          Disposition  Final diagnoses:   Bleeding from wound     Time reflects when diagnosis was documented in both MDM as applicable and the Disposition within this note     Time User Action Codes Description Comment    5/11/2023 11:15 PM Bart Pretty Add Lauren Duran  8XXA] Bleeding from wound       ED Disposition     ED Disposition   Discharge    Condition   Stable    Date/Time   Thu May 11, 2023 11:19 PM    Comment   Derrick Yates discharge to home/self care                 Follow-up Information     Follow up With Specialties Details Why Contact Info      In 3 days  follow up with your Akanksha Sales MD Internal Medicine In 1 week  [address]  [phone]            Discharge Medication List as of 5/11/2023 11:19 PM      CONTINUE these medications which have NOT CHANGED    Details   apixaban (ELIQUIS) 2 5 mg Take 1 tablet (2 5 mg total) by mouth 2 (two) times a day, Starting Sun 8/22/2021, No Print      FINASTERIDE PO Take 5 mg by mouth daily, Historical Med      furosemide (Lasix) 40 mg tablet Take 1 tablet (40 mg total) by mouth daily, Starting Sun 8/22/2021, No Print      levothyroxine 50 mcg tablet Take 50 mcg by mouth daily, Historical Med      lisinopril (ZESTRIL) 2 5 mg tablet Historical Med      tamsulosin (FLOMAX) 0 4 mg Take 1 capsule (0 4 mg total) by mouth daily with dinner, Starting Sat 1/16/2021, Normal             No discharge procedures on file     PDMP Review     None          ED Provider  Electronically Signed by           Anna Marie Nixon MD  05/12/23 9060

## 2023-05-15 ENCOUNTER — LAB REQUISITION (OUTPATIENT)
Dept: LAB | Facility: HOSPITAL | Age: 88
End: 2023-05-15

## 2023-05-15 DIAGNOSIS — D48.5 NEOPLASM OF UNCERTAIN BEHAVIOR OF SKIN: ICD-10-CM

## 2023-09-15 ENCOUNTER — HOSPITAL ENCOUNTER (EMERGENCY)
Facility: HOSPITAL | Age: 88
Discharge: HOME | End: 2023-09-15
Attending: EMERGENCY MEDICINE
Payer: MEDICARE

## 2023-09-15 VITALS
BODY MASS INDEX: 27.49 KG/M2 | DIASTOLIC BLOOD PRESSURE: 83 MMHG | HEIGHT: 65 IN | RESPIRATION RATE: 20 BRPM | SYSTOLIC BLOOD PRESSURE: 136 MMHG | WEIGHT: 165 LBS | TEMPERATURE: 96.6 F | OXYGEN SATURATION: 98 % | HEART RATE: 70 BPM

## 2023-09-15 DIAGNOSIS — S41.111A SKIN TEAR OF RIGHT UPPER EXTREMITY: Primary | ICD-10-CM

## 2023-09-15 PROCEDURE — 99284 EMERGENCY DEPT VISIT MOD MDM: CPT | Performed by: EMERGENCY MEDICINE

## 2023-09-15 PROCEDURE — 99284 EMERGENCY DEPT VISIT MOD MDM: CPT

## 2023-09-15 RX ORDER — TRANEXAMIC ACID 100 MG/ML
1000 INJECTION, SOLUTION INTRAVENOUS ONCE
Status: COMPLETED | OUTPATIENT
Start: 2023-09-15 | End: 2023-09-15

## 2023-09-15 RX ADMIN — TRANEXAMIC ACID 1000 MG: 1 INJECTION, SOLUTION INTRAVENOUS at 19:24

## 2023-09-16 ENCOUNTER — HOSPITAL ENCOUNTER (EMERGENCY)
Facility: HOSPITAL | Age: 88
Discharge: HOME | End: 2023-09-16
Attending: EMERGENCY MEDICINE
Payer: MEDICARE

## 2023-09-16 VITALS
OXYGEN SATURATION: 99 % | SYSTOLIC BLOOD PRESSURE: 135 MMHG | TEMPERATURE: 97.4 F | DIASTOLIC BLOOD PRESSURE: 47 MMHG | HEART RATE: 85 BPM | RESPIRATION RATE: 16 BRPM

## 2023-09-16 DIAGNOSIS — S51.819D: Primary | ICD-10-CM

## 2023-09-16 PROCEDURE — 99282 EMERGENCY DEPT VISIT SF MDM: CPT

## 2023-09-16 PROCEDURE — 99283 EMERGENCY DEPT VISIT LOW MDM: CPT | Performed by: EMERGENCY MEDICINE

## 2023-09-16 NOTE — ED PROVIDER NOTES
History  Chief Complaint   Patient presents with   • Wound Check     Pt "I fell on Monday (9/11) b/c I was trying on a new pair of shoes and they were not the best. I fell on my rump and maybe hit my arm on the walker. I had a scab on my arm but it got taken off and I couldn't control the bleeding. I am on that Eliquis." PT denies CP,SOB or pain      80year-old male presents with complaint of bleeding skin tear on his right forearm. He originally injured his arm on Monday when he fell and scraped his arm against his walker. His family has been keeping it bandaged and changing the bandage daily. Today when they change the bandage the scab tore off and the area began bleeding again. Despite applying multiple dressings the bleeding has continued throughout the day. Prior to Admission Medications   Prescriptions Last Dose Informant Patient Reported? Taking? FINASTERIDE PO  Family Member Yes Yes   Sig: Take 5 mg by mouth daily   apixaban (ELIQUIS) 2.5 mg  Family Member No Yes   Sig: Take 1 tablet (2.5 mg total) by mouth 2 (two) times a day   furosemide (Lasix) 40 mg tablet  Family Member No Yes   Sig: Take 1 tablet (40 mg total) by mouth daily   levothyroxine 50 mcg tablet  Family Member Yes Yes   Sig: Take 50 mcg by mouth daily   lisinopril (ZESTRIL) 2.5 mg tablet  Family Member Yes Yes   tamsulosin (FLOMAX) 0.4 mg  Family Member No Yes   Sig: Take 1 capsule (0.4 mg total) by mouth daily with dinner      Facility-Administered Medications: None       Past Medical History:   Diagnosis Date   • CHF (congestive heart failure) (HCC)    • Disease of thyroid gland    • Hypertension        Past Surgical History:   Procedure Laterality Date   • SKIN BIOPSY         History reviewed. No pertinent family history. I have reviewed and agree with the history as documented.     E-Cigarette/Vaping   • E-Cigarette Use Never User      E-Cigarette/Vaping Substances     Social History     Tobacco Use   • Smoking status: Former   • Smokeless tobacco: Never   Vaping Use   • Vaping Use: Never used   Substance Use Topics   • Alcohol use: No   • Drug use: No       Review of Systems   All other systems reviewed and are negative. Physical Exam  Physical Exam  Constitutional:       General: He is not in acute distress. HENT:      Head: Normocephalic. Cardiovascular:      Rate and Rhythm: Normal rate. Pulmonary:      Effort: Pulmonary effort is normal.   Musculoskeletal:         General: Normal range of motion. Skin:     General: Skin is warm and dry. Comments: Small skin tear to right dorsal forearm oozing blood   Neurological:      General: No focal deficit present. Mental Status: He is alert. Psychiatric:         Mood and Affect: Mood normal.         Behavior: Behavior normal.         Vital Signs  ED Triage Vitals [09/15/23 1854]   Temperature Pulse Respirations Blood Pressure SpO2   (!) 96.6 °F (35.9 °C) 70 20 136/83 98 %      Temp Source Heart Rate Source Patient Position - Orthostatic VS BP Location FiO2 (%)   Tympanic Monitor Sitting Left arm --      Pain Score       No Pain           Vitals:    09/15/23 1854   BP: 136/83   Pulse: 70   Patient Position - Orthostatic VS: Sitting         Visual Acuity      ED Medications  Medications   tranexamic acid 100mg/mL (for epistaxis) 1,000 mg (1,000 mg Nasal Given 9/15/23 1924)       Diagnostic Studies  Results Reviewed     None                 No orders to display              Procedures  Procedures         ED Course       80year-old male presenting with persistent oozing of blood from skin tear on right forearm. Patient is anticoagulated. On examination patient has relatively flat small skin tear which is oozing blood but has soaked through his wound dressing. Patient's wound was dressed with Xeroform to prevent sticking with dressing changes in the future. I also applied TXA to a nonadherent gauze and placed it over the affected area.   Wrapped the wound and Curlex and applied an Ace wrap over for pressure bandage. I reevaluated the patient's wound after 30 minutes and there been no oozing through the gauze. We will discharge the patient with instructions on future wound care. MDM    Disposition  Final diagnoses:   Skin tear of right upper extremity     Time reflects when diagnosis was documented in both MDM as applicable and the Disposition within this note     Time User Action Codes Description Comment    9/15/2023  8:17 PM Vivian Sandoval [D65.444K] Skin tear of right upper extremity       ED Disposition     ED Disposition   Discharge    Condition   Stable    Date/Time   Fri Sep 15, 2023  8:17 PM    Comment   Guerita Mendez Keeshailippantojuana discharge to home/self care. Follow-up Information     Follow up With Specialties Details Why Contact Info    Haydee Madrigal MD Internal Medicine Call  As needed [address] [facility] [address] [facility]  [phone]            Discharge Medication List as of 9/15/2023  8:18 PM      CONTINUE these medications which have NOT CHANGED    Details   apixaban (ELIQUIS) 2.5 mg Take 1 tablet (2.5 mg total) by mouth 2 (two) times a day, Starting Sun 8/22/2021, No Print      FINASTERIDE PO Take 5 mg by mouth daily, Historical Med      furosemide (Lasix) 40 mg tablet Take 1 tablet (40 mg total) by mouth daily, Starting Sun 8/22/2021, No Print      levothyroxine 50 mcg tablet Take 50 mcg by mouth daily, Historical Med      lisinopril (ZESTRIL) 2.5 mg tablet Historical Med      tamsulosin (FLOMAX) 0.4 mg Take 1 capsule (0.4 mg total) by mouth daily with dinner, Starting Sat 1/16/2021, Normal             No discharge procedures on file.     PDMP Review     None          ED Provider  Electronically Signed by           Pk Trevizo MD  09/15/23 9485

## 2023-09-16 NOTE — DISCHARGE INSTRUCTIONS
Please follow-up with your primary care physician in 3 days for wound reevaluation. Please do not remove the dressing. Please discuss with your cardiologist your use of Eliquis.

## 2023-09-16 NOTE — ED PROVIDER NOTES
History  Chief Complaint   Patient presents with   • Wound Check     Pt was seen yesterday for skin tear, wound still oozing     This is a 19-year-old male who presents to the emergency department with a wound on his right forearm. Patient was at the hospital yesterday for skin tear. He was advised to follow-up with primary care and the wound continued to ooze and there was blood on the outside of the dressing this morning. The patient denies additional injury. He has no fevers or chills. He denies pain. Prior to Admission Medications   Prescriptions Last Dose Informant Patient Reported? Taking? FINASTERIDE PO  Family Member Yes No   Sig: Take 5 mg by mouth daily   apixaban (ELIQUIS) 2.5 mg  Family Member No No   Sig: Take 1 tablet (2.5 mg total) by mouth 2 (two) times a day   dapagliflozin (Farxiga) 5 MG TABS   Yes Yes   Sig: Take 5 mg by mouth daily   furosemide (Lasix) 40 mg tablet  Family Member No No   Sig: Take 1 tablet (40 mg total) by mouth daily   levothyroxine 50 mcg tablet  Family Member Yes No   Sig: Take 50 mcg by mouth daily   lisinopril (ZESTRIL) 2.5 mg tablet  Family Member Yes No   tamsulosin (FLOMAX) 0.4 mg  Family Member No No   Sig: Take 1 capsule (0.4 mg total) by mouth daily with dinner      Facility-Administered Medications: None       Past Medical History:   Diagnosis Date   • CHF (congestive heart failure) (HCC)    • Disease of thyroid gland    • Hypertension        Past Surgical History:   Procedure Laterality Date   • SKIN BIOPSY         History reviewed. No pertinent family history. I have reviewed and agree with the history as documented.     E-Cigarette/Vaping   • E-Cigarette Use Never User      E-Cigarette/Vaping Substances     Social History     Tobacco Use   • Smoking status: Former   • Smokeless tobacco: Never   Vaping Use   • Vaping Use: Never used   Substance Use Topics   • Alcohol use: No   • Drug use: No       Review of Systems   All other systems reviewed and are negative. Physical Exam  Physical Exam  Constitutional:  Vital signs reviewed, patient appears nontoxic, no acute distress  Eyes: Pupils equal round reactive to light and accommodation, extraocular muscles intact  HEENT: trachea midline, no JVD, moist mucous membranes  Respiratory: lung sounds clear throughout, no rhonchi, no rales  Cardiovascular: regular rate rhythm, no murmurs or rubs  Abdomen: soft, nontender, nondistended, no rebound or guarding  Back: no CVA tenderness, normal inspection  Extremities: no edema, pulses equal in all 4 extremities  Neuro: awake, alert, oriented, no focal weakness  Skin: warm, dry, skin tear to the right forearm with a mild ooze, no rashes noted    Vital Signs  ED Triage Vitals [09/16/23 1322]   Temperature Pulse Respirations Blood Pressure SpO2   (!) 97.4 °F (36.3 °C) 85 16 (!) 135/47 99 %      Temp Source Heart Rate Source Patient Position - Orthostatic VS BP Location FiO2 (%)   Oral -- -- -- --      Pain Score       --           Vitals:    09/16/23 1322   BP: (!) 135/47   Pulse: 85         Visual Acuity      ED Medications  Medications - No data to display    Diagnostic Studies  Results Reviewed     None                 No orders to display              Procedures  Procedures         ED Course  ED Course as of 09/16/23 1522   Sat Sep 16, 2023   1406 The patient had surgicel placed with gauze and rolled gauze applied over the area of oozing. The patient will be re-evaluated in 20 minutes. 1521 The patient was reevaluated and had no further bleeding noted on exam.  The dressing appeared clean dry and intact. He will be discharged with follow-up to his primary care and given wound care as well. Medical Decision Making  This is a 70-year-old male who presented to the emergency department with a wound on his right forearm. I considered laceration, skin tear, abrasion. These and other diagnoses were considered. Disposition  Final diagnoses:   Skin tear of forearm without complication, subsequent encounter     Time reflects when diagnosis was documented in both MDM as applicable and the Disposition within this note     Time User Action Codes Description Comment    9/16/2023  1:58 PM Delmi Villegas Add [S51.819D] Skin tear of forearm without complication, subsequent encounter       ED Disposition     ED Disposition   Discharge    Condition   Stable    Date/Time   Sat Sep 16, 2023  2:37 PM    Comment   [place] Difilippantonio discharge to home/self care.                Follow-up Information     Follow up With Specialties Details Why Contact Info Additional Information    Shu Stoner MD Internal Medicine In 3 days For wound re-check [address] [facility]  [phone] [address]  Emergency Department Emergency Medicine  If symptoms worsen [address] [phone] [address] [facility], [address] [place] [address], [address] [place] [address]    [address] [place] [address]   [address] [facility] [address] [id number]  [address] [facility], [address], [place], [address] [facility]          Discharge Medication List as of 9/16/2023  2:47 PM      CONTINUE these medications which have NOT CHANGED    Details   dapagliflozin (Farxiga) 5 MG TABS Take 5 mg by mouth daily, Historical Med      apixaban (ELIQUIS) 2.5 mg Take 1 tablet (2.5 mg total) by mouth 2 (two) times a day, Starting Sun 8/22/2021, No Print      FINASTERIDE PO Take 5 mg by mouth daily, Historical Med      furosemide (Lasix) 40 mg tablet Take 1 tablet (40 mg total) by mouth daily, Starting Sun 8/22/2021, No Print      levothyroxine 50 mcg tablet Take 50 mcg by mouth daily, Historical Med      lisinopril (ZESTRIL) 2.5 mg tablet Historical Med      tamsulosin (FLOMAX) 0.4 mg Take 1 capsule (0.4 mg total) by mouth daily with dinner, Starting Sat 1/16/2021, Normal             No discharge procedures on file.     PDMP Review     None          ED Provider  Electronically Signed by           Keshia Martines DO  09/16/23 0543

## 2023-09-16 NOTE — ED NOTES
No bleeding noted to the right arm.  Verbal instructions provided to pt and family for follow up     Kaleigh Hoffman RN  09/16/23 4757

## 2024-09-28 ENCOUNTER — TELEPHONE (OUTPATIENT)
Dept: OTHER | Facility: OTHER | Age: 89
End: 2024-09-28

## 2024-09-28 NOTE — TELEPHONE ENCOUNTER
Nursing home or Independent living name:[facility]   If its not nursing home or Independent living, do they see PCP in Network:  Who is calling:Jessie   Callback number: [phone]  Symptoms: new admission  Did you page oncall or place in triage hub:[facility] Dr. Wallace

## 2024-09-30 ENCOUNTER — NURSING HOME VISIT (OUTPATIENT)
Dept: GERIATRICS | Facility: OTHER | Age: 89
End: 2024-09-30
Payer: MEDICARE

## 2024-09-30 ENCOUNTER — TELEPHONE (OUTPATIENT)
Dept: OTHER | Facility: OTHER | Age: 89
End: 2024-09-30

## 2024-09-30 DIAGNOSIS — R26.2 AMBULATORY DYSFUNCTION: Primary | ICD-10-CM

## 2024-09-30 DIAGNOSIS — N40.0 BENIGN PROSTATIC HYPERPLASIA, UNSPECIFIED WHETHER LOWER URINARY TRACT SYMPTOMS PRESENT: ICD-10-CM

## 2024-09-30 DIAGNOSIS — R31.0 GROSS HEMATURIA: ICD-10-CM

## 2024-09-30 DIAGNOSIS — E03.9 HYPOTHYROIDISM, UNSPECIFIED TYPE: ICD-10-CM

## 2024-09-30 DIAGNOSIS — E11.69 TYPE 2 DIABETES MELLITUS WITH OTHER SPECIFIED COMPLICATION, WITHOUT LONG-TERM CURRENT USE OF INSULIN (HCC): ICD-10-CM

## 2024-09-30 DIAGNOSIS — I10 ESSENTIAL HYPERTENSION: Chronic | ICD-10-CM

## 2024-09-30 DIAGNOSIS — I48.91 ATRIAL FIBRILLATION, UNSPECIFIED TYPE (HCC): Chronic | ICD-10-CM

## 2024-09-30 PROBLEM — E11.9 DIABETES MELLITUS (HCC): Status: ACTIVE | Noted: 2024-09-30

## 2024-09-30 PROCEDURE — 99306 1ST NF CARE HIGH MDM 50: CPT | Performed by: FAMILY MEDICINE

## 2024-09-30 NOTE — TELEPHONE ENCOUNTER
Nursing home or Independent living name:  [facility]     Who is calling: Mello     Callback number: [phone]    Symptoms: hematuria    Message sent to on call provider; verified read receipt of message.

## 2024-09-30 NOTE — PROGRESS NOTES
[facility]  [address]  [place] [address]   [facility]  History and Physical    NAME: Derrick Yates  AGE: 89 y.o. SEX: male [id number]    DATE OF ENCOUNTER: 9/30/2024    Code status:  No CPR    Assessment and Plan     1. Ambulatory dysfunction  - PT/OT ordered  - Fall precautions in place    2. Atrial fibrillation, unspecified type (HCC)  - On Eliquis 2.5 mg po bid    3. Benign prostatic hyperplasia, unspecified whether lower urinary tract symptoms present  - cont Finasteride 5 mg po qd  - cont Flomax 0.4 mg po qd    4. Essential hypertension  - cont Lisinopril 2.5 mg po qd    5. Gross hematuria  - monitor for bleeding    6. Hypothyroidism, unspecified type  - cont Levothyroxine 50 mcg po qam    7. Type 2 diabetes mellitus with other specified complication, without long-term current use of insulin (HCC)  - cont Dapagliflozin 5 mg po qd  - monitor blood sugars    All medications and routine orders were reviewed and updated as needed.    Plan discussed with: Patient    Chief Complaint     Seen for admission at Nursing Facility    History of Present Illness     Derrick HAYDEN, a 89 y/o male with PMH of HTN, CHF, A. Fib, Hypothyroidism, DM2, CKD3, BPH got admitted to [facility] after a fall, c/o left knee pain. X ray was negative for fractures, but showed chondrocalcinosis and moderate suprapatellar knee joint effusion. He received steroid inj for left arm arthritis by Ortho. His overall condition improved, got discharged to [facility] for subacute rehab.  He was seen and examined, stable. He is able to give good history. He lives alone, his daughter lives next door. He is independent of ADLs, daughter helps with IADLs and he gets Meals on wheels. He offers no c/o at this time. His left knee pain got better after steroid injection. He had hematuria this morning. His appetite and sleep is good. He uses walker at baseline.   Staff have no concerns at this time except for  hematuria.    HISTORY:  Past Medical History:   Diagnosis Date    CHF (congestive heart failure) (HCC)     Disease of thyroid gland     Hypertension      No family history on file.  Social History     Socioeconomic History    Marital status:      Spouse name: Not on file    Number of children: Not on file    Years of education: Not on file    Highest education level: Not on file   Occupational History    Not on file   Tobacco Use    Smoking status: Former    Smokeless tobacco: Never   Vaping Use    Vaping status: Never Used   Substance and Sexual Activity    Alcohol use: No    Drug use: No    Sexual activity: Not on file   Other Topics Concern    Not on file   Social History Narrative    ** Merged History Encounter **          Social Determinants of Health     Financial Resource Strain: Low Risk  (9/25/2024)    Received from [facility]    Overall Financial Resource Strain (CARDIA)     Difficulty of Paying Living Expenses: Not very hard   Food Insecurity: No Food Insecurity (9/25/2024)    Received from [facility]    Hunger Vital Sign     Worried About Running Out of Food in the Last Year: Never true     Ran Out of Food in the Last Year: Never true   Transportation Needs: No Transportation Needs (9/25/2024)    Received from [facility]    PRAPARE - Transportation     Lack of Transportation (Medical): No     Lack of Transportation (Non-Medical): No   Physical Activity: Not on file   Stress: Not on file   Social Connections: Not on file   Intimate Partner Violence: Not At Risk (9/25/2024)    Received from [facility]    Humiliation, Afraid, Rape, and Kick questionnaire     Fear of Current or Ex-Partner: No     Emotionally Abused: No     Physically Abused: No     Sexually Abused: No   Housing Stability: Low Risk  (9/25/2024)    Received from [facility]    Housing Stability Vital Sign     Unable to Pay for Housing in the Last  Year: No     Number of Times Moved in the Last Year: 0     Homeless in the Last Year: No       Allergies:  No Known Allergies    Review of Systems     Review of Systems   Constitutional:  Positive for activity change. Negative for fatigue.   HENT:  Positive for hearing loss.    Eyes: Negative.    Respiratory: Negative.     Cardiovascular: Negative.    Gastrointestinal: Negative.    Genitourinary: Negative.    Musculoskeletal:  Positive for gait problem. Negative for arthralgias.   Neurological:  Positive for weakness.   Psychiatric/Behavioral: Negative.     All other systems reviewed and are negative.  As in hPI.    Medications and orders     All medications reviewed and updated in [facility] EMR.      Objective     Vitals: T: 97.4, P: 79, R: 16, BP: 92/64, 98% on RA, wt: 173.5 lbs    Physical Exam  Vitals and nursing note reviewed.   Constitutional:       General: He is not in acute distress.     Appearance: Normal appearance. He is well-developed. He is not diaphoretic.   HENT:      Head: Normocephalic and atraumatic.      Nose: Nose normal.      Mouth/Throat:      Mouth: Oropharynx is clear and moist. Mucous membranes are moist.      Pharynx: Oropharynx is clear. No oropharyngeal exudate.   Eyes:      General: No scleral icterus.        Right eye: No discharge.         Left eye: No discharge.      Extraocular Movements: Extraocular movements intact and EOM normal.      Conjunctiva/sclera: Conjunctivae normal.   Cardiovascular:      Rate and Rhythm: Normal rate and regular rhythm.      Heart sounds: Normal heart sounds. No murmur heard.  Pulmonary:      Effort: Pulmonary effort is normal. No respiratory distress.      Breath sounds: Normal breath sounds. No wheezing.   Chest:      Chest wall: No tenderness.   Abdominal:      General: Bowel sounds are normal. There is no distension.      Palpations: Abdomen is soft.      Tenderness: There is no abdominal tenderness. There is no guarding.   Musculoskeletal:          General: No tenderness, deformity or edema. Normal range of motion.      Cervical back: Normal range of motion.      Comments: Trace edema to b/l LE knee below.   Skin:     General: Skin is warm and dry.      Comments: Dry scaly skin and extensive bruising   Neurological:      Mental Status: He is alert and oriented to person, place, and time.      Cranial Nerves: No cranial nerve deficit.      Motor: No abnormal muscle tone.      Coordination: Coordination normal.   Psychiatric:         Mood and Affect: Mood and affect and mood normal.         Behavior: Behavior normal.         Pertinent Laboratory/Diagnostic Studies:   The following labs/studies were reviewed please see chart or hospital paperwork for details.  Ref Range & Units 9/28/24 0453   Hemoglobin  12.5 - 17.0 g/dL 11.2 Low    Hematocrit  37.0 - 48.0 % 34.5 Low    WBC  4.0 - 10.5 thou/cmm 6.1   RBC  4.00 - 5.40 mill/cmm 3.80 Low    Platelet  140 - 350 thou/cmm 312   MPV  7.5 - 11.3 fL 7.9   MCV  80 - 100 fL 91   MCH  27.0 - 36.0 pg 29.5   MCHC  32.0 - 37.0 g/dL 32.5   RDW  12.0 - 16.0 % 20.2 High      Ref Range & Units 9/28/24 0453 9/26/24 0423 9/25/24 1144 7/10/24 0907 4/19/24 0956 1/3/24 0819 8/14/23 0916   Glucose  65 - 99 mg/dL 165 High  112 High  220 High  90 77 86 109 High    BUN  7 - 28 mg/dL 48 High  39 High  38 High  35 High  37 High  38 High  41 High    Creatinine  0.53 - 1.30 mg/dL 1.42 High  1.53 High  1.57 High  1.42 High  1.46 High  1.56 High  1.72 High    Sodium  135 - 145 mmol/L 133 Low  134 Low  132 Low  134 Low  135 132 Low  137   Potassium  3.5 - 5.2 mmol/L 5.4 High  4.5 4.9 4.6 4.4 4.7 4.4   Chloride  100 - 109 mmol/L 95 Low  98 Low  93 Low  96 Low  98 Low  96 Low  100   Carbon Dioxide  21 - 31 mmol/L 29 28 30 26 26 28 27   Calcium  8.5 - 10.1 mg/dL 9.3 8.5 9.2 8.6 8.5 8.2 Low  8.5   ANION GAP  3 - 11 9 8 9 12 High  11 8 10   eGFRcr  >59 45 Low  42 Low  40 Low  45 Low  44 Low  41 Low  36 Low        - Counseling Documentation: patient  was counseled regarding: risk factor reductions

## 2024-10-01 ENCOUNTER — TELEPHONE (OUTPATIENT)
Age: 89
End: 2024-10-01

## 2024-10-01 NOTE — TELEPHONE ENCOUNTER
Sony from [facility] called to request Dr. Flores to sign a death certificate for the patient.     Date:   TOD: 12:57 pm   Case / EDRS # : [id number]    Please advise  Thank you

## 2024-10-01 NOTE — TELEPHONE ENCOUNTER
[facility] message sent to Dr. Flores as well as re-Routing message as High Priority to Dr. Flores through [employer].

## 2025-05-28 NOTE — PROGRESS NOTES
[facility]  Progress Note Dong Thaoilippantonio 7/23/1929, 80 y o  male MRN: [id number]  Unit/Bed#: S -01 Encounter: [id number]  Primary Care Provider: Mike Petersen MD   Date and time admitted to hospital: 8/14/2021 11:54 AM    Atrial fibrillation [facility])  Assessment & Plan  Assessment:  Present on admission    Plan:  - Not on rate control interventions as HR is 49-64  - Hold Eliquis     Chronic diastolic congestive heart failure ([address] )  Assessment & Plan  Wt Readings from Last 3 Encounters:   08/14/21 74 4 kg (164 lb)   01/16/21 76 2 kg (168 lb)   01/23/17 97 7 kg (215 lb 6 2 oz)     Assessment:  Patient appears at baseline  Has some leg swelling, but this is normal per family  No respiratory complaints  Plan:  Monitor and follow Lasix p r n  Stage 3 chronic kidney disease [facility])  Assessment & Plan  Lab Results   Component Value Date    EGFR 43 08/17/2021    EGFR 47 08/16/2021    EGFR 46 08/15/2021    CREATININE 1 40 (H) 08/17/2021    CREATININE 1 32 (H) 08/16/2021    CREATININE 1 34 (H) 08/15/2021     Assessment:  Creatinine appears at baseline/slightly above baseline and with a baseline eGFR and Cr at 22-27, and 2 09-2 46 respectively this is evidence of continuing CKD  Plan:  - Hold Lasix, Lisinopril  - Monitor BMP  - Avoid hypotension, nephrotoxins      Essential hypertension  Assessment & Plan  Assessment:  BP elevated in ED  BP has now stabilized  Plan    - Monitor, treat as indicated    * Gross hematuria  Assessment & Plan  Assessment:  No signs of infection  Garcia in place red urine, red sediment apparent      Plan:  - Currently on CBI follow with Urology    - Hold Eliquis until urine free of blood for 48 hours per Urology  - Urology following        VTE Pharmacologic Prophylaxis:   VTE Score: 4 Currently withheld pending clearance of hematuria    Mechanical VTE Prophylaxis in Place: Yes    Patient Centered Rounds: With IM team    Discussions with Specialists or [facility]     Jerod Valerio MD, FACP, NILEG, FAASLD   MD Zita Gaviria PA-C April S Ashworth, [facility]   Isabelle Gilbert, [facility]  Mihai Fernando, FNP-C   Linette Moey, [facility]   [address], [address]   [place]  [address] [phone]   FAX: [phone]  [facility]   [address], [address]   [place]  [address] [phone]   FAX: [phone]       Patient Care Team:  Mana Chris APRN - NP as PCP - General  Raf Gastelum MD (Gastroenterology)  Mark Spear MD (General Surgery)      Patient Active Problem List   Diagnosis    H/O total knee replacement, left    Colon cancer (HCC)    Ileus (HCC)    Hypertension    Hypercholesterolemia    BPH (benign prostatic hyperplasia)    Type 2 diabetes mellitus (HCC)    H/O bilateral inguinal hernia repair    H/O partial resection of colon    Thrombocytopenia     Dexter NIELSEN Gregoriowill is being seen at [facility] for management of alcohol induced liver disease.  The active problem list, all pertinent past medical history, medications, liver histology, radiologic findings and laboratory findings related to the liver disorder were reviewed and discussed with the patient.      The patient is a 71 y.o. male who was found to have chronic liver disease and potential cirrhosis in 3/2024 when he underwent a CT scan prior to undergoing partial colectomy for colon cancer.    He tolerated surgery without any evidence of hepatic decompensation.  He did not require post-operative chemotherapy or XRT.     The patient had been consuming 6-8 alcohol drinks every day since before 2014.    The patient now has a rare alcohol drink, 1-4 per month.    An assessment of liver fibrosis with Fibroscan in 11/2024 &  Other Care Team Provider: Dr Adrianne Nieto    Education and Discussions with Family / Patient: Patient    Current Length of Stay: 3 day(s)    Current Patient Status: Inpatient     Discharge Plan / Estimated Discharge Date: 24-48 hrs    Code Status: Level 3 - DNAR and DNI      Subjective: The patient was seen at the bedside this morning where he had no complaints of pain  The patient currently is off by irrigation and generally feeling better, still with hematuria  Objective:     Vitals:   Temp (24hrs), Av 2 °F (36 8 °C), Min:98 °F (36 7 °C), Max:98 4 °F (36 9 °C)    Temp:  [98 °F (36 7 °C)-98 4 °F (36 9 °C)] 98 4 °F (36 9 °C)  HR:  [52-60] 60  Resp:  [16-18] 18  BP: (103-133)/(57-63) 133/63  SpO2:  [94 %-97 %] 97 %  Body mass index is 27 29 kg/m²  Input and Output Summary (last 24 hours): Intake/Output Summary (Last 24 hours) at 2021  Last data filed at 2021 0870  Gross per 24 hour   Intake 120 ml   Output 2553 ml   Net -2433 ml       Physical Exam:     Physical Exam  Constitutional:       General: He is not in acute distress  HENT:      Head: Normocephalic  Eyes:      Extraocular Movements: Extraocular movements intact  Cardiovascular:      Rate and Rhythm: Normal rate and regular rhythm  Heart sounds: No murmur heard  Pulmonary:      Breath sounds: No wheezing or rales  Abdominal:      General: Abdomen is flat  Palpations: Abdomen is soft  Genitourinary:     Comments: Garcia catheter draining red, with red sediment  Musculoskeletal:      Right lower leg: Edema (1+) present  Left lower leg: Edema (1+) present  Skin:     General: Skin is warm and dry  Neurological:      General: No focal deficit present  Mental Status: He is alert  Psychiatric:         Mood and Affect: Mood normal          Thought Content:  Thought content normal          Additional Data:     Labs:  Results from last 7 days   Lab Units 21  0610   WBC Thousand/uL 8 04   HEMOGLOBIN g/dL 11 5*   HEMATOCRIT % 36 2*   PLATELETS Thousands/uL 274   NEUTROS PCT % 60   LYMPHS PCT % 25   MONOS PCT % 11   EOS PCT % 2     Results from last 7 days   Lab Units 08/17/21  0610   SODIUM mmol/L 136   POTASSIUM mmol/L 4 1   CHLORIDE mmol/L 103   CO2 mmol/L 26   BUN mg/dL 30*   CREATININE mg/dL 1 40*   ANION GAP mmol/L 7   CALCIUM mg/dL 8 5   ALBUMIN g/dL 2 8*   TOTAL BILIRUBIN mg/dL 0 54   ALK PHOS U/L 92   ALT U/L 16   AST U/L 17   GLUCOSE RANDOM mg/dL 93     Results from last 7 days   Lab Units 08/14/21  1242   INR  1 31*                       Recent Cultures (last 7 days):     Results from last 7 days   Lab Units 08/14/21  1214   URINE CULTURE  <10,000 cfu/ml        Lines/Drains:  Invasive Devices     Peripheral Intravenous Line            Peripheral IV 08/14/21 Right Antecubital 3 days                Telemetry:        Last 24 Hours Medication List:   Current Facility-Administered Medications   Medication Dose Route Frequency Provider Last Rate    acetaminophen  650 mg Oral Q6H PRN Audi Kaitlin Aguila, PA-C      finasteride  5 mg Oral Daily Audi Kaitlin Aguila, PA-C      levothyroxine  50 mcg Oral Early Morning Audi Kaitlin Aguila, PA-C      ondansetron  4 mg Intravenous Q6H PRN Bosworth Coppersmith, PA-C      tamsulosin  0 4 mg Oral Daily With Dinner Audi Kaitlin Aguila, PA-C          Today, Patient Was Seen By: Laura Mason MD    ** Please Note: This note has been constructed using a voice recognition system   **